# Patient Record
Sex: MALE | Race: BLACK OR AFRICAN AMERICAN | Employment: OTHER | URBAN - METROPOLITAN AREA
[De-identification: names, ages, dates, MRNs, and addresses within clinical notes are randomized per-mention and may not be internally consistent; named-entity substitution may affect disease eponyms.]

---

## 2017-01-30 ENCOUNTER — HOSPITAL ENCOUNTER (OUTPATIENT)
Dept: LAB | Age: 67
Discharge: HOME | End: 2017-01-30
Payer: COMMERCIAL

## 2017-01-30 LAB
ANION GAP SERPL CALCULATED.3IONS-SCNC: 16 MEQ/L (ref 7–13)
BUN BLDV-MCNC: 20 MG/DL (ref 8–23)
CALCIUM SERPL-MCNC: 10.1 MG/DL (ref 8.6–10.2)
CHLORIDE BLD-SCNC: 102 MEQ/L (ref 98–107)
CO2: 25 MEQ/L (ref 22–29)
CREAT SERPL-MCNC: 1.5 MG/DL (ref 0.7–1.2)
GFR AFRICAN AMERICAN: 56.8
GFR NON-AFRICAN AMERICAN: 46.9
GLUCOSE BLD-MCNC: 122 MG/DL (ref 74–109)
POTASSIUM SERPL-SCNC: 3.8 MEQ/L (ref 3.5–5.1)
SODIUM BLD-SCNC: 143 MEQ/L (ref 132–144)
TSH SERPL DL<=0.05 MIU/L-ACNC: 0.7 UIU/ML (ref 0.27–4.2)

## 2017-01-30 PROCEDURE — 84443 ASSAY THYROID STIM HORMONE: CPT

## 2017-01-30 PROCEDURE — 36415 COLL VENOUS BLD VENIPUNCTURE: CPT

## 2017-01-30 PROCEDURE — 80048 BASIC METABOLIC PNL TOTAL CA: CPT

## 2018-02-26 ENCOUNTER — HOSPITAL ENCOUNTER (OUTPATIENT)
Dept: LAB | Age: 68
Discharge: HOME | End: 2018-02-26
Payer: MEDICARE

## 2018-02-26 LAB
ANION GAP SERPL CALCULATED.3IONS-SCNC: 15 MEQ/L (ref 7–13)
BUN BLDV-MCNC: 21 MG/DL (ref 8–23)
CALCIUM SERPL-MCNC: 9.2 MG/DL (ref 8.6–10.2)
CHLORIDE BLD-SCNC: 102 MEQ/L (ref 98–107)
CHOLESTEROL, TOTAL: 113 MG/DL (ref 0–199)
CO2: 25 MEQ/L (ref 22–29)
CREAT SERPL-MCNC: 1.36 MG/DL (ref 0.7–1.2)
GFR AFRICAN AMERICAN: >60
GFR NON-AFRICAN AMERICAN: 52.3
GLUCOSE BLD-MCNC: 122 MG/DL (ref 74–109)
HDLC SERPL-MCNC: 36 MG/DL (ref 40–59)
LDL CHOLESTEROL CALCULATED: 50 MG/DL (ref 0–129)
POTASSIUM SERPL-SCNC: 4.2 MEQ/L (ref 3.5–5.1)
SODIUM BLD-SCNC: 142 MEQ/L (ref 132–144)
TRIGL SERPL-MCNC: 137 MG/DL (ref 0–200)
TSH SERPL DL<=0.05 MIU/L-ACNC: 1.18 UIU/ML (ref 0.27–4.2)

## 2018-02-26 PROCEDURE — 80061 LIPID PANEL: CPT

## 2018-02-26 PROCEDURE — 36415 COLL VENOUS BLD VENIPUNCTURE: CPT

## 2018-02-26 PROCEDURE — 80048 BASIC METABOLIC PNL TOTAL CA: CPT

## 2018-02-26 PROCEDURE — 84443 ASSAY THYROID STIM HORMONE: CPT

## 2018-03-23 ENCOUNTER — HOSPITAL ENCOUNTER (OUTPATIENT)
Dept: ULTRASOUND IMAGING | Age: 68
Discharge: HOME | End: 2018-03-25
Payer: MEDICARE

## 2018-03-23 DIAGNOSIS — M79.89 CALF SWELLING: ICD-10-CM

## 2018-03-23 PROCEDURE — 93970 EXTREMITY STUDY: CPT

## 2018-04-20 ENCOUNTER — HOSPITAL ENCOUNTER (OUTPATIENT)
Age: 68
Setting detail: SPECIMEN
Discharge: HOME | End: 2018-04-20
Payer: MEDICARE

## 2018-04-20 ENCOUNTER — OFFICE VISIT (OUTPATIENT)
Dept: FAMILY MEDICINE CLINIC | Age: 68
End: 2018-04-20
Payer: MEDICARE

## 2018-04-20 VITALS
HEART RATE: 89 BPM | TEMPERATURE: 98.2 F | HEIGHT: 78 IN | BODY MASS INDEX: 27.68 KG/M2 | RESPIRATION RATE: 16 BRPM | DIASTOLIC BLOOD PRESSURE: 72 MMHG | SYSTOLIC BLOOD PRESSURE: 122 MMHG | OXYGEN SATURATION: 96 % | WEIGHT: 239.2 LBS

## 2018-04-20 DIAGNOSIS — J34.0 NASAL ABSCESS: Primary | ICD-10-CM

## 2018-04-20 DIAGNOSIS — J34.0 NASAL ABSCESS: ICD-10-CM

## 2018-04-20 PROCEDURE — 87070 CULTURE OTHR SPECIMN AEROBIC: CPT

## 2018-04-20 PROCEDURE — 99212 OFFICE O/P EST SF 10 MIN: CPT | Performed by: NURSE PRACTITIONER

## 2018-04-20 PROCEDURE — 87186 SC STD MICRODIL/AGAR DIL: CPT

## 2018-04-20 PROCEDURE — 87075 CULTR BACTERIA EXCEPT BLOOD: CPT

## 2018-04-20 PROCEDURE — 87205 SMEAR GRAM STAIN: CPT

## 2018-04-20 PROCEDURE — 87077 CULTURE AEROBIC IDENTIFY: CPT

## 2018-04-20 RX ORDER — SULFAMETHOXAZOLE AND TRIMETHOPRIM 800; 160 MG/1; MG/1
1 TABLET ORAL 2 TIMES DAILY
Qty: 20 TABLET | Refills: 0 | Status: SHIPPED | OUTPATIENT
Start: 2018-04-20 | End: 2018-04-30

## 2018-04-20 ASSESSMENT — ENCOUNTER SYMPTOMS
CHEST TIGHTNESS: 0
COUGH: 0
SHORTNESS OF BREATH: 0
WHEEZING: 0

## 2018-04-23 LAB
ANAEROBIC CULTURE: ABNORMAL
GRAM STAIN RESULT: ABNORMAL
ORGANISM: ABNORMAL
ORGANISM: ABNORMAL
WOUND/ABSCESS: ABNORMAL
WOUND/ABSCESS: ABNORMAL

## 2018-08-15 ENCOUNTER — ANESTHESIA (OUTPATIENT)
Dept: OPERATING ROOM | Age: 68
End: 2018-08-15
Payer: MEDICARE

## 2018-08-15 ENCOUNTER — ANESTHESIA EVENT (OUTPATIENT)
Dept: OPERATING ROOM | Age: 68
End: 2018-08-15
Payer: MEDICARE

## 2018-08-15 ENCOUNTER — HOSPITAL ENCOUNTER (OUTPATIENT)
Age: 68
Setting detail: OUTPATIENT SURGERY
Discharge: HOME | End: 2018-08-15
Attending: SPECIALIST | Admitting: SPECIALIST
Payer: MEDICARE

## 2018-08-15 VITALS
WEIGHT: 230 LBS | HEART RATE: 61 BPM | TEMPERATURE: 98.2 F | RESPIRATION RATE: 16 BRPM | BODY MASS INDEX: 27.16 KG/M2 | OXYGEN SATURATION: 93 % | DIASTOLIC BLOOD PRESSURE: 79 MMHG | HEIGHT: 77 IN | SYSTOLIC BLOOD PRESSURE: 144 MMHG

## 2018-08-15 VITALS
SYSTOLIC BLOOD PRESSURE: 99 MMHG | OXYGEN SATURATION: 95 % | RESPIRATION RATE: 14 BRPM | DIASTOLIC BLOOD PRESSURE: 55 MMHG

## 2018-08-15 PROCEDURE — 2500000003 HC RX 250 WO HCPCS: Performed by: NURSE ANESTHETIST, CERTIFIED REGISTERED

## 2018-08-15 PROCEDURE — 7100000010 HC PHASE II RECOVERY - FIRST 15 MIN: Performed by: SPECIALIST

## 2018-08-15 PROCEDURE — 2580000003 HC RX 258: Performed by: SPECIALIST

## 2018-08-15 PROCEDURE — 6370000000 HC RX 637 (ALT 250 FOR IP): Performed by: SPECIALIST

## 2018-08-15 PROCEDURE — 2580000003 HC RX 258: Performed by: NURSE PRACTITIONER

## 2018-08-15 PROCEDURE — 3700000001 HC ADD 15 MINUTES (ANESTHESIA): Performed by: SPECIALIST

## 2018-08-15 PROCEDURE — 7100000011 HC PHASE II RECOVERY - ADDTL 15 MIN: Performed by: SPECIALIST

## 2018-08-15 PROCEDURE — 6360000002 HC RX W HCPCS: Performed by: NURSE ANESTHETIST, CERTIFIED REGISTERED

## 2018-08-15 PROCEDURE — 3700000000 HC ANESTHESIA ATTENDED CARE: Performed by: SPECIALIST

## 2018-08-15 PROCEDURE — 2709999900 HC NON-CHARGEABLE SUPPLY: Performed by: SPECIALIST

## 2018-08-15 PROCEDURE — 3609027000 HC COLONOSCOPY: Performed by: SPECIALIST

## 2018-08-15 PROCEDURE — 88305 TISSUE EXAM BY PATHOLOGIST: CPT

## 2018-08-15 RX ORDER — PROPOFOL 10 MG/ML
INJECTION, EMULSION INTRAVENOUS PRN
Status: DISCONTINUED | OUTPATIENT
Start: 2018-08-15 | End: 2018-08-15 | Stop reason: SDUPTHER

## 2018-08-15 RX ORDER — MAGNESIUM HYDROXIDE 1200 MG/15ML
LIQUID ORAL PRN
Status: DISCONTINUED | OUTPATIENT
Start: 2018-08-15 | End: 2018-08-15 | Stop reason: HOSPADM

## 2018-08-15 RX ORDER — ONDANSETRON 2 MG/ML
4 INJECTION INTRAMUSCULAR; INTRAVENOUS
Status: DISCONTINUED | OUTPATIENT
Start: 2018-08-15 | End: 2018-08-15 | Stop reason: HOSPADM

## 2018-08-15 RX ORDER — SODIUM CHLORIDE 0.9 % (FLUSH) 0.9 %
10 SYRINGE (ML) INJECTION PRN
Status: DISCONTINUED | OUTPATIENT
Start: 2018-08-15 | End: 2018-08-15 | Stop reason: HOSPADM

## 2018-08-15 RX ORDER — SODIUM CHLORIDE 0.9 % (FLUSH) 0.9 %
10 SYRINGE (ML) INJECTION EVERY 12 HOURS SCHEDULED
Status: DISCONTINUED | OUTPATIENT
Start: 2018-08-15 | End: 2018-08-15 | Stop reason: HOSPADM

## 2018-08-15 RX ORDER — SODIUM CHLORIDE, SODIUM LACTATE, POTASSIUM CHLORIDE, CALCIUM CHLORIDE 600; 310; 30; 20 MG/100ML; MG/100ML; MG/100ML; MG/100ML
INJECTION, SOLUTION INTRAVENOUS CONTINUOUS
Status: DISCONTINUED | OUTPATIENT
Start: 2018-08-15 | End: 2018-08-15 | Stop reason: HOSPADM

## 2018-08-15 RX ORDER — SIMETHICONE 20 MG/.3ML
EMULSION ORAL PRN
Status: DISCONTINUED | OUTPATIENT
Start: 2018-08-15 | End: 2018-08-15 | Stop reason: HOSPADM

## 2018-08-15 RX ORDER — LIDOCAINE HYDROCHLORIDE 10 MG/ML
1 INJECTION, SOLUTION EPIDURAL; INFILTRATION; INTRACAUDAL; PERINEURAL
Status: DISCONTINUED | OUTPATIENT
Start: 2018-08-15 | End: 2018-08-15 | Stop reason: HOSPADM

## 2018-08-15 RX ORDER — LIDOCAINE HYDROCHLORIDE 10 MG/ML
INJECTION, SOLUTION INFILTRATION; PERINEURAL PRN
Status: DISCONTINUED | OUTPATIENT
Start: 2018-08-15 | End: 2018-08-15 | Stop reason: SDUPTHER

## 2018-08-15 RX ADMIN — PROPOFOL 10 MG: 10 INJECTION, EMULSION INTRAVENOUS at 07:54

## 2018-08-15 RX ADMIN — PROPOFOL 40 MG: 10 INJECTION, EMULSION INTRAVENOUS at 07:39

## 2018-08-15 RX ADMIN — LIDOCAINE HYDROCHLORIDE 40 MG: 10 INJECTION, SOLUTION INFILTRATION; PERINEURAL at 07:38

## 2018-08-15 RX ADMIN — PROPOFOL 20 MG: 10 INJECTION, EMULSION INTRAVENOUS at 07:51

## 2018-08-15 RX ADMIN — PROPOFOL 30 MG: 10 INJECTION, EMULSION INTRAVENOUS at 07:47

## 2018-08-15 RX ADMIN — PROPOFOL 30 MG: 10 INJECTION, EMULSION INTRAVENOUS at 07:46

## 2018-08-15 RX ADMIN — PROPOFOL 20 MG: 10 INJECTION, EMULSION INTRAVENOUS at 07:40

## 2018-08-15 RX ADMIN — PROPOFOL 20 MG: 10 INJECTION, EMULSION INTRAVENOUS at 07:52

## 2018-08-15 RX ADMIN — PROPOFOL 30 MG: 10 INJECTION, EMULSION INTRAVENOUS at 07:43

## 2018-08-15 RX ADMIN — PROPOFOL 50 MG: 10 INJECTION, EMULSION INTRAVENOUS at 07:38

## 2018-08-15 RX ADMIN — SODIUM CHLORIDE, POTASSIUM CHLORIDE, SODIUM LACTATE AND CALCIUM CHLORIDE: 600; 310; 30; 20 INJECTION, SOLUTION INTRAVENOUS at 06:51

## 2018-08-15 RX ADMIN — PROPOFOL 10 MG: 10 INJECTION, EMULSION INTRAVENOUS at 07:58

## 2018-08-15 RX ADMIN — PROPOFOL 10 MG: 10 INJECTION, EMULSION INTRAVENOUS at 07:53

## 2018-08-15 RX ADMIN — PROPOFOL 20 MG: 10 INJECTION, EMULSION INTRAVENOUS at 07:48

## 2018-08-15 RX ADMIN — PROPOFOL 30 MG: 10 INJECTION, EMULSION INTRAVENOUS at 07:45

## 2018-08-15 RX ADMIN — PROPOFOL 20 MG: 10 INJECTION, EMULSION INTRAVENOUS at 07:44

## 2018-08-15 RX ADMIN — PROPOFOL 20 MG: 10 INJECTION, EMULSION INTRAVENOUS at 07:50

## 2018-08-15 RX ADMIN — PROPOFOL 10 MG: 10 INJECTION, EMULSION INTRAVENOUS at 07:56

## 2018-08-15 RX ADMIN — PROPOFOL 20 MG: 10 INJECTION, EMULSION INTRAVENOUS at 07:49

## 2018-08-15 RX ADMIN — PROPOFOL 30 MG: 10 INJECTION, EMULSION INTRAVENOUS at 07:42

## 2018-08-15 RX ADMIN — PROPOFOL 30 MG: 10 INJECTION, EMULSION INTRAVENOUS at 07:41

## 2018-08-15 ASSESSMENT — PULMONARY FUNCTION TESTS
PIF_VALUE: 0
PIF_VALUE: 1
PIF_VALUE: 0

## 2018-08-15 ASSESSMENT — PAIN - FUNCTIONAL ASSESSMENT: PAIN_FUNCTIONAL_ASSESSMENT: 0-10

## 2018-08-15 NOTE — OP NOTE
Colonoscopy Procedure Note      Patient: Carlotta Bowen  YOB: 1950  MRN: [id number]  Date of Procedure: 8/15/2018    Pre-Op Diagnosis:  - High Risk Screening, Family hx of colon ca    Post-Op Diagnosis: Same, polyp cecum. Internal hemorrhoid. Procedure(s):  COLONOSCOPY      Surgeon(s):  Manjula Freedman MD    Staff:  Scrub Person First: Quinn Berg     Consent:  The patient or their legal guardian has signed a consent, and is aware of the potential risks, benefits, alternatives, and potential complications of this procedure. These include, but are not limited to hemorrhage, bleeding, post procedural pain, perforation, phlebitis, aspiration, hypotension, hypoxia, cardiovascular events such as arryhthmia, and possibly death. Additionally, the possibility of missed colonic polyps and interval colon cancer was discussed in the consent. Anesthesia:  Monitor Anesthesia Care    Procedure: An informed consent was obtained from the patient after explanation of indications, benefits, possible risks and complications of the procedure. The patient was then taken to the endoscopy suite, placed in the left lateral decubitus position, and the above IV anesthesia was administered. A digital rectal examination was performed and revealed negative without mass, lesions or tenderness. The Olympus video colonoscope was placed in the patient's rectum under digital direction and advanced to the cecum. The cecum was identified by characteristic anatomy and confirmed with presence ileo-cecal valve, appendical orifice and transillumination of right lower quadrant. The prep was There were scattered liquid stool with some particles seen in the colon especially the right colon. Which was precluding a very optimal evaluation of the colonic mucosa. .    The scope was then withdrawn back through the cecum, ascending, transverse, descending, sigmoid colon, and rectum.   Careful circumferential examination of the mucosa in these areas demonstrated:    FINDINGS:  Cecum: Abnormal  A polyp measuring about 2 mm in size seen in the cecum which was removed using a cold biopsy forceps. .  Ascending Colon: Normal.  Hepatic Flexure: Normal.  Transverse Colon:Normal.  Splenic Flexure: Normal.  Descending Colon: Normal.  Sigmoid Colon: Normal.  Rectum: Normal.  Retroflexed Views: Rectum did show internal hemorrhoids. Patient tolerated the procedure well and was taken to the PACU in good condition. Estimated Blood Loss: * No values recorded between 8/15/2018  7:35 AM and 8/15/2018  1:35 AM *  Complication: None  Specimen Sent:   ID Type Source Tests Collected by Time Destination   A : cecal polyp Tissue Cecum SURGICAL PATHOLOGY Ruy Jennings MD 8/15/2018 [id number]        Impression:  See post-procedure diagnoses. Polyp cecum and internal hemorrhoid. Recommendations:  Since the prep was not very optimal would repeat colonoscopy after 3 years.     Electronically Signed:  Ruy Jennings MD  [facility]  8/15/2018

## 2018-08-15 NOTE — ANESTHESIA PRE PROCEDURE
Department of Anesthesiology  Preprocedure Note       Name:  Maged Ren   Age:  78 y.o.  :  1950                                          MRN:  [id number]         Date:  8/15/2018      Surgeon: Naida Verde):  [place] MD    Procedure: Procedure(s):  COLONOSCOPY    Medications prior to admission:   Prior to Admission medications    Medication Sig Start Date End Date Taking?  Authorizing Provider   oxybutynin (DITROPAN XL) 10 MG CR tablet Take 1 tablet by mouth daily 3/22/16  Yes Nataliia Puente MD   gabapentin (NEURONTIN) 300 MG capsule  1/3/16  Yes Historical Provider, MD   levalbuterol (XOPENEX HFA) 45 MCG/ACT inhaler Inhale 2 puffs into the lungs every 4 hours as needed for Wheezing   Yes Historical Provider, MD   naproxen (NAPROSYN) 500 MG tablet Take 500 mg by mouth 2 times daily (with meals)   Yes Historical Provider, MD   brimonidine (ALPHAGAN) 0.2 % ophthalmic solution  16  Yes Historical Provider, MD   tamsulosin (FLOMAX) 0.4 MG capsule  12/14/15  Yes Historical Provider, MD   lovastatin (MEVACOR) 40 MG tablet  16  Yes Historical Provider, MD   levothyroxine (SYNTHROID) 100 MCG tablet  16  Yes Historical Provider, MD   clonazePAM Rometta Burton) 2 MG tablet  12/26/15  Yes Historical Provider, MD   allopurinol (ZYLOPRIM) 300 MG tablet  16  Yes Historical Provider, MD   lisinopril (PRINIVIL;ZESTRIL) 2.5 MG tablet  16  Yes Historical Provider, MD   PrednisoLONE Sodium Phosphate (PREDNISOL OP) Apply to eye every 4 hours as needed   Yes Historical Provider, MD   predniSONE (DELTASONE) 10 MG tablet Take 10 mg by mouth daily   Yes Historical Provider, MD   traMADol (ULTRAM) 50 MG tablet Take 50 mg by mouth as needed for Pain    Historical Provider, MD       Current medications:    Current Facility-Administered Medications   Medication Dose Route Frequency Provider Last Rate Last Dose    lactated ringers infusion   Intravenous Continuous Bernardino Stagers, APRN -  mL/hr at 02/26/2018    LABGLOM 52.3 02/26/2018    GLUCOSE 122 02/26/2018    GLUCOSE 79 05/09/2012    CALCIUM 9.2 02/26/2018       POC Tests: No results for input(s): POCGLU, POCNA, POCK, POCCL, POCBUN, POCHEMO, POCHCT in the last 72 hours. Coags: No results found for: PROTIME, INR, APTT    HCG (If Applicable): No results found for: PREGTESTUR, PREGSERUM, HCG, HCGQUANT     ABGs: No results found for: PHART, PO2ART, VHC4MOZ, KKW4LDL, BEART, U3DEUXKI     Type & Screen (If Applicable):  No results found for: LABABO, [address]    Anesthesia Evaluation  Patient summary reviewed and Nursing notes reviewed  Airway: Mallampati: II  TM distance: >3 FB   Neck ROM: full  Mouth opening: > = 3 FB Dental: normal exam         Pulmonary:normal exam              Patient did not smoke on day of surgery. ROS comment: Former smoker   Cardiovascular:    (+) hypertension:,          Beta Blocker:  Not on Beta Blocker         Neuro/Psych:   Negative Neuro/Psych ROS              GI/Hepatic/Renal: Neg GI/Hepatic/Renal ROS  (+) bowel prep,           Endo/Other:    (+) hypothyroidism::., .          Pt had no PAT visit        ROS comment: gout Abdominal:           Vascular: negative vascular ROS. Anesthesia Plan      MAC     ASA 2       Induction: intravenous. Anesthetic plan and risks discussed with patient. Plan discussed with CRNA.                   TAMIR Blank - CRNA   8/15/2018

## 2018-08-31 ENCOUNTER — OFFICE VISIT (OUTPATIENT)
Dept: SURGERY | Age: 68
End: 2018-08-31
Payer: MEDICARE

## 2018-08-31 VITALS
HEIGHT: 78 IN | WEIGHT: 231 LBS | BODY MASS INDEX: 26.73 KG/M2 | SYSTOLIC BLOOD PRESSURE: 130 MMHG | DIASTOLIC BLOOD PRESSURE: 74 MMHG | TEMPERATURE: 97.9 F

## 2018-08-31 DIAGNOSIS — K62.89 ANAL OR RECTAL PAIN: Primary | ICD-10-CM

## 2018-08-31 PROCEDURE — 46600 DIAGNOSTIC ANOSCOPY SPX: CPT | Performed by: COLON & RECTAL SURGERY

## 2018-08-31 PROCEDURE — 99203 OFFICE O/P NEW LOW 30 MIN: CPT | Performed by: COLON & RECTAL SURGERY

## 2018-08-31 ASSESSMENT — ENCOUNTER SYMPTOMS
ABDOMINAL PAIN: 0
BLOOD IN STOOL: 0
ANAL BLEEDING: 0
WHEEZING: 0
RECTAL PAIN: 1
ABDOMINAL DISTENTION: 0
SHORTNESS OF BREATH: 0
CHEST TIGHTNESS: 0

## 2018-08-31 NOTE — PROGRESS NOTES
report has been partially produced using speech recognition software and may cause contain errors related to that system including grammar, punctuation and spelling as well as words and phrases that may seem inappropriate.  If there are questions or concerns please feel free to contact me to clarify

## 2018-09-07 ENCOUNTER — OFFICE VISIT (OUTPATIENT)
Dept: SURGERY | Age: 68
End: 2018-09-07
Payer: MEDICARE

## 2018-09-07 VITALS
SYSTOLIC BLOOD PRESSURE: 124 MMHG | TEMPERATURE: 97.1 F | BODY MASS INDEX: 27.54 KG/M2 | DIASTOLIC BLOOD PRESSURE: 78 MMHG | WEIGHT: 238 LBS | HEIGHT: 78 IN

## 2018-09-07 DIAGNOSIS — K62.89 ANAL PAIN: Primary | ICD-10-CM

## 2018-09-07 PROCEDURE — 99213 OFFICE O/P EST LOW 20 MIN: CPT | Performed by: COLON & RECTAL SURGERY

## 2018-09-07 RX ORDER — POLYETHYLENE GLYCOL 3350, SODIUM CHLORIDE, SODIUM BICARBONATE, POTASSIUM CHLORIDE 420; 11.2; 5.72; 1.48 G/4L; G/4L; G/4L; G/4L
POWDER, FOR SOLUTION ORAL
COMMUNITY
Start: 2018-07-24 | End: 2018-10-25 | Stop reason: ALTCHOICE

## 2018-09-07 ASSESSMENT — ENCOUNTER SYMPTOMS
SHORTNESS OF BREATH: 0
CONSTIPATION: 0
ABDOMINAL DISTENTION: 0
ABDOMINAL PAIN: 0
DIARRHEA: 0
RECTAL PAIN: 0
ANAL BLEEDING: 0
APNEA: 0
COUGH: 0
BLOOD IN STOOL: 0
COLOR CHANGE: 0

## 2018-10-02 ENCOUNTER — HOSPITAL ENCOUNTER (OUTPATIENT)
Dept: SLEEP CENTER | Age: 68
Discharge: HOME | End: 2018-10-04
Payer: MEDICARE

## 2018-10-02 PROCEDURE — 95811 POLYSOM 6/>YRS CPAP 4/> PARM: CPT

## 2018-10-02 PROCEDURE — 95811 POLYSOM 6/>YRS CPAP 4/> PARM: CPT | Performed by: INTERNAL MEDICINE

## 2018-10-25 ENCOUNTER — OFFICE VISIT (OUTPATIENT)
Dept: PULMONOLOGY | Age: 68
End: 2018-10-25
Payer: MEDICARE

## 2018-10-25 VITALS
DIASTOLIC BLOOD PRESSURE: 64 MMHG | WEIGHT: 228 LBS | RESPIRATION RATE: 16 BRPM | HEART RATE: 92 BPM | TEMPERATURE: 97.3 F | SYSTOLIC BLOOD PRESSURE: 130 MMHG | OXYGEN SATURATION: 93 % | BODY MASS INDEX: 26.38 KG/M2 | HEIGHT: 78 IN

## 2018-10-25 DIAGNOSIS — G47.33 OSA (OBSTRUCTIVE SLEEP APNEA): Primary | ICD-10-CM

## 2018-10-25 DIAGNOSIS — J98.6 ELEVATED DIAPHRAGM: ICD-10-CM

## 2018-10-25 DIAGNOSIS — Z77.090 ASBESTOS EXPOSURE: ICD-10-CM

## 2018-10-25 DIAGNOSIS — J44.9 ASTHMA-CHRONIC OBSTRUCTIVE PULMONARY DISEASE OVERLAP SYNDROME (HCC): ICD-10-CM

## 2018-10-25 PROBLEM — H52.03 HYPEROPIA OF BOTH EYES WITH ASTIGMATISM AND PRESBYOPIA: Status: ACTIVE | Noted: 2017-11-15

## 2018-10-25 PROBLEM — H52.203 HYPEROPIA OF BOTH EYES WITH ASTIGMATISM AND PRESBYOPIA: Status: ACTIVE | Noted: 2017-11-15

## 2018-10-25 PROBLEM — H40.1122 PRIMARY OPEN ANGLE GLAUCOMA (POAG) OF LEFT EYE, MODERATE STAGE: Status: ACTIVE | Noted: 2017-03-15

## 2018-10-25 PROBLEM — H52.4 HYPEROPIA OF BOTH EYES WITH ASTIGMATISM AND PRESBYOPIA: Status: ACTIVE | Noted: 2017-11-15

## 2018-10-25 PROBLEM — E11.9 TYPE 2 DIABETES MELLITUS WITHOUT COMPLICATION, WITHOUT LONG-TERM CURRENT USE OF INSULIN (HCC): Status: ACTIVE | Noted: 2017-11-15

## 2018-10-25 PROCEDURE — 99205 OFFICE O/P NEW HI 60 MIN: CPT | Performed by: INTERNAL MEDICINE

## 2018-10-25 RX ORDER — BLOOD SUGAR DIAGNOSTIC
STRIP MISCELLANEOUS
Status: ON HOLD | COMMUNITY
Start: 2018-09-10 | End: 2021-01-23 | Stop reason: HOSPADM

## 2018-10-25 RX ORDER — LANCETS
EACH MISCELLANEOUS
Status: ON HOLD | COMMUNITY
Start: 2018-09-10 | End: 2021-01-23 | Stop reason: HOSPADM

## 2018-10-25 ASSESSMENT — ENCOUNTER SYMPTOMS
WHEEZING: 1
SORE THROAT: 0
DIARRHEA: 0
VOMITING: 0
ABDOMINAL PAIN: 0
VOICE CHANGE: 0
COUGH: 1
RHINORRHEA: 0
EYE ITCHING: 0
CHEST TIGHTNESS: 0
NAUSEA: 0
SHORTNESS OF BREATH: 1

## 2018-10-25 NOTE — PROGRESS NOTES
Alcohol use No    Drug use: No    Sexual activity: Not on file     Other Topics Concern    Not on file     Social History Narrative    No narrative on file         Review of Systems   Constitutional: Negative for chills, diaphoresis, fatigue and fever. HENT: Negative for congestion, mouth sores, nosebleeds, postnasal drip, rhinorrhea, sneezing, sore throat and voice change. Eyes: Negative for itching and visual disturbance. Respiratory: Positive for cough, shortness of breath and wheezing. Negative for chest tightness. Cardiovascular: Negative. Negative for chest pain, palpitations and leg swelling. Gastrointestinal: Negative for abdominal pain, diarrhea, nausea and vomiting. Genitourinary: Negative for difficulty urinating and hematuria. Musculoskeletal: Negative for arthralgias, joint swelling and myalgias. Skin: Negative for rash. Allergic/Immunologic: Negative for environmental allergies. Neurological: Negative for dizziness, tremors, weakness and headaches. Psychiatric/Behavioral: Positive for sleep disturbance. Negative for behavioral problems. Objective:     Vitals:    10/25/18 1303   BP: 130/64   Pulse: 92   Resp: 16   Temp: 97.3 °F (36.3 °C)   TempSrc: Tympanic   SpO2: 93%   Weight: 228 lb (103.4 kg)   Height: 6' 6\" (1.981 m)         Physical Exam   Constitutional: He is oriented to person, place, and time. He appears well-developed and well-nourished. HENT:   Head: Normocephalic and atraumatic. Nose: Nose normal.   Mouth/Throat: Oropharynx is clear and moist.   Mallampati   IV   Eyes: Pupils are equal, round, and reactive to light. Conjunctivae and EOM are normal.   Neck: No JVD present. No tracheal deviation present. No thyromegaly present. Cardiovascular: Normal rate and regular rhythm. Exam reveals no gallop and no friction rub. No murmur heard. Pulmonary/Chest: Effort normal and breath sounds normal. No respiratory distress. He has no wheezes.  He has no rales. He exhibits no tenderness. diminished Breath sound bilaterally. Abdominal: He exhibits no distension. Musculoskeletal: Normal range of motion. Lymphadenopathy:     He has no cervical adenopathy. Neurological: He is alert and oriented to person, place, and time. No cranial nerve deficit. Skin: Skin is warm and dry. No rash noted. Psychiatric: He has a normal mood and affect. His behavior is normal.       Current Outpatient Prescriptions   Medication Sig Dispense Refill    ACCU-CHEK SALVADOR PLUS strip       ACCU-CHEK SOFTCLIX LANCETS [facility]       Respiratory Therapy Supplies SOURAV Change CPAP pressure to 6 cm   New CPAP mask and supplies 1 Device 0    gabapentin (NEURONTIN) 300 MG capsule       levalbuterol (XOPENEX HFA) 45 MCG/ACT inhaler Inhale 2 puffs into the lungs every 4 hours as needed for Wheezing      naproxen (NAPROSYN) 500 MG tablet Take 500 mg by mouth 2 times daily (with meals)      brimonidine (ALPHAGAN) 0.2 % ophthalmic solution       lovastatin (MEVACOR) 40 MG tablet       levothyroxine (SYNTHROID) 100 MCG tablet       clonazePAM (KLONOPIN) 2 MG tablet       allopurinol (ZYLOPRIM) 300 MG tablet       lisinopril (PRINIVIL;ZESTRIL) 2.5 MG tablet       traMADol (ULTRAM) 50 MG tablet Take 50 mg by mouth as needed for Pain      predniSONE (DELTASONE) 10 MG tablet Take 10 mg by mouth daily       No current facility-administered medications for this visit. Results for orders placed during the hospital encounter of 08/04/16   XR Chest Standard TWO VW    Narrative EXAMINATION 2 VIEWS CHEST      CLINICAL DATA HISTORY OF ASBESTOSIS, FOR FOLLOWUP. FINDINGS Two view examination of the chest demonstrates elevation of  the right hemidiaphragm and what may be atelectasis just above the  right hemidiaphragm, not seen on previous study of 1/7/2013.  The right  pleural-based density in the elevated right hemidiaphragm has developed  since the prior examination and warrants continual monitoring and  followup possibly with a CT scan. No acute cardiovascular Disease or change in heart size has occurred in  the left chest since 2/2/2012. IMPRESSION ATELECTASIS OR DEVELOPING LESION ADJACENT TO THE RIGHT  ELEVATED HEMIDIAPHRAGM SINCE 11/11/2010 AND 1/7/2013 WARRANTING  FOLLOWUP. LEFT CHEST IS CLEAR AND HEART SIZE IS NORMAL. [facility] By- Radha Coffey M.D. Released By- Radha Coffey M.D. Released Date Time- 08/04/16 1455   This document has been electronically signed. ------------------------------------------------------------------------------   ]  Results for orders placed during the hospital encounter of 08/09/16   CT Chest WO Contrast    Narrative EXAMINATION CT SCAN OF THE CHEST      INDICATION History of asbestosis. Elevated right hemidiaphragm. Atelectasis. TECHNIQUE Helical CT was performed through the chest without IV  contrast.     COMPARISON November 11, 2010. FINDINGS There is some soft tissue density projecting from the right  infrahilar area with linear extension into the posterior right lower  lobe consistent with an area of atelectasis/scarring. This was present  on the previous exam but appears slightly more extensive on today's  study. There is no discrete mass lesion. Airway is patent. Right  hemidiaphragm again remains elevated. No pleural effusion or thickening. Small calcified mediastinal lymph  nodes consistent with remote granulomatous disease. No pathologically  enlarged lymph nodes. No thoracic aortic aneurysm. The chest wall and  lower neck are normal.   Hyperdense material again noted within the  gallbladder. Osseous structures are normal.     IMPRESSION       CHRONIC ELEVATION OF RIGHT HEMIDIAPHRAGM. SLIGHT INCREASE IN RIGHT  LOWER LOBE ATELECTASIS/SCARRING.  NO EVIDENCE OF INTERSTITIAL COARSENING  OR PLEURAL DISEASE TO SUGGEST PULMONARY MANIFESTATIONS OF ASBESTOSIS  EXPOSURE. All CT scans at this facility use dose modulation, iterative  reconstruction, and/or weight based dosing when appropriate to reduce  radiation dose to as low as reasonably achievable. Michelle Markie By- Glenn Scanlon M.D. Released By- Glenn Scanlon M.D. Released Date Time- 08/12/16 1035   This document has been electronically signed. ------------------------------------------------------------------------------     Assessment/Plan:     1. YANG (obstructive sleep apnea)  Patient had CPAP titration study since patient was restless with current CPAP with 8 cm . He is on CPAP since last 15 yrs. He had  Repeat CPAPstudy done on 10/2/18 and therapeutic CPAP at 6. CPAP pressure will be switch to 6 cm . Patient is using CPAP with full face Mask. Patient is compliant with CPAP therapy, he is using about 8-9 hours . He has klonopin due to act out in sleep , restless sleep. He need CPAP supply . Counseling: CPAP/BiPAP uses, patient advised to use CPAP at least 5-6 hours every night. Driving: patient is advised for extreme caution when driving or operating machinery if there is a feeling of drowsiness, especially while driving it is preferable to stop driving and take a brief nap. Sleep hygiene:Avoid supine sleep, sleep on  sides. Avoid  sleep deprivation. Explained sleep hygiene. Advice to avoid Alcohol and sedative    2. Asthma-chronic obstructive pulmonary disease overlap syndrome ([address]  He is on Xopenex HFA 2 puff prn , PFT  And CXR requested. 3. Elevated diaphragm, chronic on rt. side. CXR in 2016 show chronic elevation of rt. Diaphragm with lower lobe atelectasis and scarring     4. Asbestos exposure  He was evaluated for asbestosis and he was told he has asbestosis      Return in about 4 weeks (around 11/22/2018) for COPD, yang, pft result.       Jennifer Nieves MD

## 2018-10-30 ENCOUNTER — HOSPITAL ENCOUNTER (OUTPATIENT)
Dept: GENERAL RADIOLOGY | Age: 68
Discharge: HOME | End: 2018-11-01
Payer: MEDICARE

## 2018-10-30 ENCOUNTER — HOSPITAL ENCOUNTER (OUTPATIENT)
Dept: PULMONOLOGY | Age: 68
Discharge: HOME | End: 2018-10-30
Payer: MEDICARE

## 2018-10-30 DIAGNOSIS — J44.9 ASTHMA-CHRONIC OBSTRUCTIVE PULMONARY DISEASE OVERLAP SYNDROME (HCC): ICD-10-CM

## 2018-10-30 PROCEDURE — 94060 EVALUATION OF WHEEZING: CPT | Performed by: INTERNAL MEDICINE

## 2018-10-30 PROCEDURE — 94726 PLETHYSMOGRAPHY LUNG VOLUMES: CPT | Performed by: INTERNAL MEDICINE

## 2018-10-30 PROCEDURE — 94729 DIFFUSING CAPACITY: CPT | Performed by: INTERNAL MEDICINE

## 2018-10-30 PROCEDURE — 94726 PLETHYSMOGRAPHY LUNG VOLUMES: CPT

## 2018-10-30 PROCEDURE — 71046 X-RAY EXAM CHEST 2 VIEWS: CPT

## 2018-10-30 PROCEDURE — 94060 EVALUATION OF WHEEZING: CPT

## 2018-10-30 PROCEDURE — 94729 DIFFUSING CAPACITY: CPT

## 2018-10-30 PROCEDURE — 6360000002 HC RX W HCPCS: Performed by: INTERNAL MEDICINE

## 2018-10-30 RX ORDER — ALBUTEROL SULFATE 2.5 MG/3ML
2.5 SOLUTION RESPIRATORY (INHALATION) ONCE
Status: COMPLETED | OUTPATIENT
Start: 2018-10-30 | End: 2018-10-30

## 2018-10-30 RX ADMIN — ALBUTEROL SULFATE 2.5 MG: 2.5 SOLUTION RESPIRATORY (INHALATION) at 13:17

## 2018-12-05 ENCOUNTER — OFFICE VISIT (OUTPATIENT)
Dept: PULMONOLOGY | Age: 68
End: 2018-12-05
Payer: MEDICARE

## 2018-12-05 VITALS
RESPIRATION RATE: 16 BRPM | TEMPERATURE: 97.6 F | WEIGHT: 225 LBS | SYSTOLIC BLOOD PRESSURE: 136 MMHG | HEIGHT: 78 IN | OXYGEN SATURATION: 97 % | HEART RATE: 88 BPM | DIASTOLIC BLOOD PRESSURE: 70 MMHG | BODY MASS INDEX: 26.03 KG/M2

## 2018-12-05 DIAGNOSIS — J44.9 CHRONIC OBSTRUCTIVE PULMONARY DISEASE, UNSPECIFIED COPD TYPE (HCC): ICD-10-CM

## 2018-12-05 DIAGNOSIS — G47.33 OSA (OBSTRUCTIVE SLEEP APNEA): Primary | ICD-10-CM

## 2018-12-05 DIAGNOSIS — Z77.090 ASBESTOS EXPOSURE: ICD-10-CM

## 2018-12-05 PROCEDURE — 99214 OFFICE O/P EST MOD 30 MIN: CPT | Performed by: INTERNAL MEDICINE

## 2018-12-05 ASSESSMENT — ENCOUNTER SYMPTOMS
SORE THROAT: 0
NAUSEA: 0
WHEEZING: 0
RHINORRHEA: 0
ABDOMINAL PAIN: 0
VOMITING: 0
SHORTNESS OF BREATH: 1
CHEST TIGHTNESS: 0
DIARRHEA: 0
VOICE CHANGE: 0
EYE ITCHING: 0
COUGH: 0

## 2018-12-05 NOTE — PROGRESS NOTES
Topics Concern    Not on file     Social History Narrative    No narrative on file         Review of Systems   Constitutional: Negative for chills, diaphoresis, fatigue and fever. HENT: Negative for congestion, mouth sores, nosebleeds, postnasal drip, rhinorrhea, sneezing, sore throat and voice change. Eyes: Negative for itching and visual disturbance. Respiratory: Positive for shortness of breath. Negative for cough, chest tightness and wheezing. Cardiovascular: Negative. Negative for chest pain, palpitations and leg swelling. Gastrointestinal: Negative for abdominal pain, diarrhea, nausea and vomiting. Genitourinary: Negative for difficulty urinating and hematuria. Musculoskeletal: Negative for arthralgias, joint swelling and myalgias. Skin: Negative for rash. Allergic/Immunologic: Negative for environmental allergies. Neurological: Negative for dizziness, tremors, weakness and headaches. Psychiatric/Behavioral: Positive for sleep disturbance. Negative for behavioral problems. Objective:     Vitals:    12/05/18 1411   BP: 136/70   Pulse: 88   Resp: 16   Temp: 97.6 °F (36.4 °C)   TempSrc: Tympanic   SpO2: 97%   Weight: 225 lb (102.1 kg)   Height: 6' 6\" (1.981 m)         Physical Exam   Constitutional: He is oriented to person, place, and time. He appears well-developed and well-nourished. HENT:   Head: Normocephalic and atraumatic. Nose: Nose normal.   Mouth/Throat: Oropharynx is clear and moist.   Mallampati  IV     Eyes: Pupils are equal, round, and reactive to light. Conjunctivae and EOM are normal.   Neck: No JVD present. No tracheal deviation present. No thyromegaly present. Cardiovascular: Normal rate and regular rhythm. Exam reveals no gallop and no friction rub. No murmur heard. Pulmonary/Chest: Effort normal and breath sounds normal. No respiratory distress. He has no wheezes. He has no rales. He exhibits no tenderness. diminished Breath sound bilaterally. during the hospital encounter of 08/09/16   CT Chest WO Contrast    Narrative EXAMINATION CT SCAN OF THE CHEST      INDICATION History of asbestosis. Elevated right hemidiaphragm. Atelectasis. TECHNIQUE Helical CT was performed through the chest without IV  contrast.     COMPARISON November 11, 2010. FINDINGS There is some soft tissue density projecting from the right  infrahilar area with linear extension into the posterior right lower  lobe consistent with an area of atelectasis/scarring. This was present  on the previous exam but appears slightly more extensive on today's  study. There is no discrete mass lesion. Airway is patent. Right  hemidiaphragm again remains elevated. No pleural effusion or thickening. Small calcified mediastinal lymph  nodes consistent with remote granulomatous disease. No pathologically  enlarged lymph nodes. No thoracic aortic aneurysm. The chest wall and  lower neck are normal.   Hyperdense material again noted within the  gallbladder. Osseous structures are normal.     IMPRESSION       CHRONIC ELEVATION OF RIGHT HEMIDIAPHRAGM. SLIGHT INCREASE IN RIGHT  LOWER LOBE ATELECTASIS/SCARRING. NO EVIDENCE OF INTERSTITIAL COARSENING  OR PLEURAL DISEASE TO SUGGEST PULMONARY MANIFESTATIONS OF ASBESTOSIS  EXPOSURE. All CT scans at this facility use dose modulation, iterative  reconstruction, and/or weight based dosing when appropriate to reduce  radiation dose to as low as reasonably achievable. Ari Gutierrez ByPradip Holliday M.D. Released By- Olga Holliday M.D. Released Date Time- 08/12/16 1035   This document has been electronically signed.    ------------------------------------------------------------------------------   [address]                      [facility], [address] [facility]                                PULMONARY FUNCTION     PATIENT NAME: [facility] :        1950  MED REC NO:   [id number]                            ROOM:  ACCOUNT NO:   [de-identified]                           ADMIT DATE: 10/30/2018  PROVIDER:     Giancarlo Galvan MD     DATE OF PROCEDURE:  10/30/2018     PFTs were done on this 46-year-old gentleman who is 6 feet 6 inches,  weighs 225 pounds with 35-year smoking history, quit 18 years ago,  presenting with dyspnea.     Spirometry showed a forced vital capacity of 4.12 L, which is 79% of  predicted. FEV1 was 2.96 L, which is 74% of predicted, FEV1/FVC ratio  was mildly reduced to 72%, FEF 25-75% was reduced to 1.95 L per second,  which is 57% of predicted. No significant reversibility noted after  bronchodilators. MVV was severely reduced.     Lung volumes done by body plethysmography showed a total lung capacity  of 7.65 L, which is 88% of predicted. Residual volume was 3.61 L, which  is 127% of predicted with increased RV/TLC ratio to 47%.    Diffusion capacity was normal at 26.52, which is 95% of predicted.     Airway resistance was slightly increased. Airway conductance was  slightly reduced.     OVERALL IMPRESSION:  The study suggests mild obstructive ventilatory  impairment without significant reversibility associated with mild  overinflation, consistent with mild COPD. Clinical correlation is  needed.           Christina Anglin MD     D: 2018 10:15:34       T: 2018 7:52    Assessment/Plan:     1. YANG (obstructive sleep apnea)  Patient is using CPAP with 6   centimeters of H2O with heated humidity. Patient is using CPAP for about 8  hours every night. Patient is using CPAP with   Full face Mask. Patient said  sleep is restful with the CPAP use. Patient is compliant with CPAP therapy and benefiting with CPAP use. Continue CPAP therapy as before. Counseling: CPAP/BiPAP uses, patient advised to use CPAP at least 5-6 hours every night.     Driving: patient is advised for extreme caution when driving or operating machinery if

## 2018-12-22 ENCOUNTER — HOSPITAL ENCOUNTER (OUTPATIENT)
Dept: LAB | Age: 68
Discharge: HOME | End: 2018-12-22
Payer: MEDICARE

## 2018-12-22 LAB
ALBUMIN SERPL-MCNC: 3.9 G/DL (ref 3.9–4.9)
ALP BLD-CCNC: 76 U/L (ref 35–104)
ALT SERPL-CCNC: 11 U/L (ref 0–41)
ANION GAP SERPL CALCULATED.3IONS-SCNC: 12 MEQ/L (ref 7–13)
ANISOCYTOSIS: ABNORMAL
AST SERPL-CCNC: 20 U/L (ref 0–40)
ATYPICAL LYMPHOCYTE RELATIVE PERCENT: 1 %
BASOPHILS ABSOLUTE: 0 K/UL (ref 0–0.2)
BASOPHILS RELATIVE PERCENT: 0.5 %
BILIRUB SERPL-MCNC: 0.3 MG/DL (ref 0–1.2)
BILIRUBIN DIRECT: <0.2 MG/DL (ref 0–0.3)
BILIRUBIN, INDIRECT: NORMAL MG/DL (ref 0–0.6)
BUN BLDV-MCNC: 11 MG/DL (ref 8–23)
CALCIUM SERPL-MCNC: 9.3 MG/DL (ref 8.6–10.2)
CHLORIDE BLD-SCNC: 105 MEQ/L (ref 98–107)
CO2: 26 MEQ/L (ref 22–29)
CREAT SERPL-MCNC: 1.42 MG/DL (ref 0.7–1.2)
EOSINOPHILS ABSOLUTE: 0.2 K/UL (ref 0–0.7)
EOSINOPHILS RELATIVE PERCENT: 4 %
GFR AFRICAN AMERICAN: >60
GFR NON-AFRICAN AMERICAN: 49.7
GLOBULIN: 2.8 G/DL (ref 2.3–3.5)
GLUCOSE BLD-MCNC: 102 MG/DL (ref 74–109)
HCT VFR BLD CALC: 42.1 % (ref 42–52)
HEMOGLOBIN: 13.9 G/DL (ref 14–18)
LYMPHOCYTES ABSOLUTE: 1.2 K/UL (ref 1–4.8)
LYMPHOCYTES RELATIVE PERCENT: 23 %
MCH RBC QN AUTO: 28.6 PG (ref 27–31.3)
MCHC RBC AUTO-ENTMCNC: 33.1 % (ref 33–37)
MCV RBC AUTO: 86.4 FL (ref 80–100)
MICROCYTES: ABNORMAL
MONOCYTES ABSOLUTE: 0.6 K/UL (ref 0.2–0.8)
MONOCYTES RELATIVE PERCENT: 13.2 %
MYELOCYTE PERCENT: 1 %
NEUTROPHILS ABSOLUTE: 3 K/UL (ref 1.4–6.5)
NEUTROPHILS RELATIVE PERCENT: 59 %
PDW BLD-RTO: 16.7 % (ref 11.5–14.5)
PLATELET # BLD: 150 K/UL (ref 130–400)
POTASSIUM SERPL-SCNC: 4.4 MEQ/L (ref 3.5–5.1)
RBC # BLD: 4.87 M/UL (ref 4.7–6.1)
SMUDGE CELLS: 0.9
SODIUM BLD-SCNC: 143 MEQ/L (ref 132–144)
TOTAL PROTEIN: 6.7 G/DL (ref 6.4–8.1)
TSH SERPL DL<=0.05 MIU/L-ACNC: 1.38 UIU/ML (ref 0.27–4.2)
WBC # BLD: 5 K/UL (ref 4.8–10.8)

## 2018-12-22 PROCEDURE — 36415 COLL VENOUS BLD VENIPUNCTURE: CPT

## 2018-12-22 PROCEDURE — 85025 COMPLETE CBC W/AUTO DIFF WBC: CPT

## 2018-12-22 PROCEDURE — 82248 BILIRUBIN DIRECT: CPT

## 2018-12-22 PROCEDURE — 84443 ASSAY THYROID STIM HORMONE: CPT

## 2018-12-22 PROCEDURE — 80053 COMPREHEN METABOLIC PANEL: CPT

## 2019-05-15 ENCOUNTER — OFFICE VISIT (OUTPATIENT)
Dept: GASTROENTEROLOGY | Age: 69
End: 2019-05-15
Payer: MEDICARE

## 2019-05-15 VITALS
WEIGHT: 239 LBS | HEIGHT: 78 IN | BODY MASS INDEX: 27.65 KG/M2 | HEART RATE: 88 BPM | RESPIRATION RATE: 14 BRPM | OXYGEN SATURATION: 98 %

## 2019-05-15 DIAGNOSIS — K62.89 RECTAL PAIN: Primary | ICD-10-CM

## 2019-05-15 PROCEDURE — 99213 OFFICE O/P EST LOW 20 MIN: CPT | Performed by: SPECIALIST

## 2019-05-15 NOTE — PROGRESS NOTES
this report has been partially produced using speech recognitionsoftware  and may cause contain errors related to that system including grammar, punctuation and spelling as well as words andphrases that may seem inappropriate. If there are questions or concerns please feel free to contact me to clarify.

## 2019-05-17 ENCOUNTER — HOSPITAL ENCOUNTER (OUTPATIENT)
Dept: GENERAL RADIOLOGY | Age: 69
Discharge: HOME | End: 2019-05-19
Payer: MEDICARE

## 2019-05-17 DIAGNOSIS — M25.561 ARTHRALGIA OF RIGHT LOWER LEG: ICD-10-CM

## 2019-05-17 DIAGNOSIS — M25.562 ARTHRALGIA OF LEFT LOWER LEG: ICD-10-CM

## 2019-05-17 PROCEDURE — 73564 X-RAY EXAM KNEE 4 OR MORE: CPT

## 2019-05-21 ENCOUNTER — ANESTHESIA (OUTPATIENT)
Dept: ENDOSCOPY | Age: 69
End: 2019-05-21
Payer: MEDICARE

## 2019-05-21 ENCOUNTER — ANESTHESIA EVENT (OUTPATIENT)
Dept: ENDOSCOPY | Age: 69
End: 2019-05-21
Payer: MEDICARE

## 2019-05-21 ENCOUNTER — HOSPITAL ENCOUNTER (OUTPATIENT)
Age: 69
Setting detail: OUTPATIENT SURGERY
Discharge: HOME | End: 2019-05-21
Attending: SPECIALIST | Admitting: SPECIALIST
Payer: MEDICARE

## 2019-05-21 VITALS
BODY MASS INDEX: 27.19 KG/M2 | WEIGHT: 235 LBS | SYSTOLIC BLOOD PRESSURE: 134 MMHG | HEART RATE: 59 BPM | RESPIRATION RATE: 16 BRPM | DIASTOLIC BLOOD PRESSURE: 64 MMHG | OXYGEN SATURATION: 97 % | HEIGHT: 78 IN | TEMPERATURE: 98.1 F

## 2019-05-21 DIAGNOSIS — K62.89 RECTAL PAIN: ICD-10-CM

## 2019-05-21 PROCEDURE — 7100000010 HC PHASE II RECOVERY - FIRST 15 MIN: Performed by: SPECIALIST

## 2019-05-21 PROCEDURE — 3609155500 HC SIGMOIDOSCOPY WITH DILATION: Performed by: SPECIALIST

## 2019-05-21 PROCEDURE — 45330 DIAGNOSTIC SIGMOIDOSCOPY: CPT | Performed by: SPECIALIST

## 2019-05-21 PROCEDURE — 2580000003 HC RX 258: Performed by: SPECIALIST

## 2019-05-21 PROCEDURE — 7100000011 HC PHASE II RECOVERY - ADDTL 15 MIN: Performed by: SPECIALIST

## 2019-05-21 RX ORDER — LIDOCAINE HYDROCHLORIDE 10 MG/ML
1 INJECTION, SOLUTION EPIDURAL; INFILTRATION; INTRACAUDAL; PERINEURAL
Status: DISCONTINUED | OUTPATIENT
Start: 2019-05-21 | End: 2019-05-21 | Stop reason: HOSPADM

## 2019-05-21 RX ORDER — SODIUM CHLORIDE 9 MG/ML
INJECTION, SOLUTION INTRAVENOUS CONTINUOUS
Status: DISCONTINUED | OUTPATIENT
Start: 2019-05-21 | End: 2019-05-21 | Stop reason: HOSPADM

## 2019-05-21 RX ORDER — SODIUM CHLORIDE 0.9 % (FLUSH) 0.9 %
10 SYRINGE (ML) INJECTION PRN
Status: DISCONTINUED | OUTPATIENT
Start: 2019-05-21 | End: 2019-05-21 | Stop reason: HOSPADM

## 2019-05-21 RX ORDER — SODIUM CHLORIDE 0.9 % (FLUSH) 0.9 %
10 SYRINGE (ML) INJECTION EVERY 12 HOURS SCHEDULED
Status: DISCONTINUED | OUTPATIENT
Start: 2019-05-21 | End: 2019-05-21 | Stop reason: HOSPADM

## 2019-05-21 RX ADMIN — SODIUM CHLORIDE: 9 INJECTION, SOLUTION INTRAVENOUS at 10:29

## 2019-05-21 NOTE — ANESTHESIA PRE PROCEDURE
Department of Anesthesiology  Preprocedure Note       Name:  Barbara Haynes   Age:  80 y.o.  :  1950                                          MRN:  [id number]         Date:  2019      Surgeon: Tiffany iVllanueva):  Dawne Sicard, MD    Procedure: SIGMOIDOSCOPY W/ DILATION (N/A )    Medications prior to admission:   Prior to Admission medications    Medication Sig Start Date End Date Taking? Authorizing Provider   metFORMIN (GLUCOPHAGE) 500 MG tablet  10/30/18   Historical Provider, MD   [facility]  9/10/18   Historical Provider, MD   ACCU-CHEK SOFTCLIX LANCETS [facility]  9/10/18   Historical Provider, MD   Respiratory Therapy Supplies SOURAV Change CPAP pressure to 6 cm   New CPAP mask and supplies 10/25/18   Omar Frazier MD   gabapentin (NEURONTIN) 300 MG capsule  1/3/16   Historical Provider, MD   levalbuterol Shirin Morenoder HFA) 45 MCG/ACT inhaler Inhale 2 puffs into the lungs every 4 hours as needed for Wheezing    Historical Provider, MD   naproxen (NAPROSYN) 500 MG tablet Take 500 mg by mouth 2 times daily (with meals)    Historical Provider, MD   brimonidine (ALPHAGAN) 0.2 % ophthalmic solution  16   Historical Provider, MD   lovastatin (MEVACOR) 40 MG tablet  16   Historical Provider, MD   levothyroxine (SYNTHROID) 100 MCG tablet  16   Historical Provider, MD   clonazePAM [facility]) 2 MG tablet  12/26/15   Historical Provider, MD   allopurinol (ZYLOPRIM) 300 MG tablet  16   Historical Provider, MD   lisinopril (PRINIVIL;ZESTRIL) 2.5 MG tablet  16   Historical Provider, MD   traMADol (ULTRAM) 50 MG tablet Take 50 mg by mouth as needed for Pain    Historical Provider, MD   predniSONE (DELTASONE) 10 MG tablet Take 10 mg by mouth daily    Historical Provider, MD       Current medications:    No current facility-administered medications for this encounter.         Allergies:  No Known Allergies    Problem List:    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    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    Type 2 diabetes mellitus without complication, without long-term current use of insulin (HCC) E11.9    Wheezing R06.2       Past Medical History:        Diagnosis Date    Lung disease        Past Surgical History:        Procedure Laterality Date    COLONOSCOPY      HAND SURGERY Right     [place] COLON CA SCRN NOT HI RSK IND N/A 8/15/2018    COLONOSCOPY performed by Rashaun Sharpe MD at [address] [place] [address] Road         Social History:    Social History     Tobacco Use    Smoking status: Former Smoker     Years: 40.00     Start date: 1968     Last attempt to quit: 2000     Years since quittin.9    Smokeless tobacco: Never Used   Substance Use Topics    Alcohol use: No     Alcohol/week: 0.0 oz                                Counseling given: Not Answered      Vital Signs (Current): There were no vitals filed for this visit.                                            BP Readings from Last 3 Encounters:   18 136/70   10/25/18 130/64   18 124/78       NPO Status:                                                                                 BMI:   Wt Readings from Last 3 Encounters:   05/15/19 239 lb (108.4 kg)   18 225 lb (102.1 kg)   10/25/18 228 lb (103.4 kg)     There is no height or weight on file to calculate BMI.    CBC:   Lab Results   Component Value Date    WBC 5.0 2018    RBC 4.87 2018    HGB 13.9 2018    HCT 42.1 12/22/2018    MCV 86.4 12/22/2018    RDW 16.7 12/22/2018     12/22/2018       CMP:   Lab Results   Component Value Date     12/22/2018    K 4.4 12/22/2018     12/22/2018    CO2 26 12/22/2018    BUN 11 12/22/2018    CREATININE 1.42 12/22/2018    GFRAA >60.0 12/22/2018    LABGLOM 49.7 12/22/2018    GLUCOSE 102 12/22/2018    GLUCOSE 79 05/09/2012    PROT 6.7 12/22/2018    CALCIUM 9.3 12/22/2018    BILITOT 0.3 12/22/2018    ALKPHOS 76 12/22/2018    AST 20 12/22/2018    ALT 11 12/22/2018       POC Tests: No results for input(s): POCGLU, POCNA, POCK, POCCL, POCBUN, POCHEMO, POCHCT in the last 72 hours. Coags: No results found for: PROTIME, INR, APTT    HCG (If Applicable): No results found for: PREGTESTUR, PREGSERUM, HCG, HCGQUANT     ABGs: No results found for: PHART, PO2ART, UWF3VQA, GXW0XEK, BEART, I3ZCWGEI     Type & Screen (If Applicable):  No results found for: [facility]    Anesthesia Evaluation  Patient summary reviewed and Nursing notes reviewed  Airway: Mallampati: II  TM distance: >3 FB   Neck ROM: full  Mouth opening: > = 3 FB Dental: normal exam         Pulmonary:normal exam    (+) COPD:  sleep apnea:  asthma:           Patient did not smoke on day of surgery. ROS comment: Former smoker   Cardiovascular:    (+) hypertension:, hyperlipidemia                  Neuro/Psych:   Negative Neuro/Psych ROS              GI/Hepatic/Renal:   (+) bowel prep,           Endo/Other:    (+) DiabetesType II DM, , hypothyroidism::., .                 Abdominal:           Vascular: negative vascular ROS. Anesthesia Plan      MAC     ASA 2     (Patient will try procedure with no anesthesia and if unable to tolerate, then anesthesia will be used. Anesthesia standby.)  Induction: intravenous. Anesthetic plan and risks discussed with patient. Plan discussed with CRNA.                   TAMIR Huggins - RONALDO   5/21/2019

## 2019-09-05 ENCOUNTER — HOSPITAL ENCOUNTER (OUTPATIENT)
Dept: LAB | Age: 69
Discharge: HOME | End: 2019-09-05
Payer: MEDICARE

## 2019-09-05 LAB
ALBUMIN SERPL-MCNC: 3.9 G/DL (ref 3.5–4.6)
ALP BLD-CCNC: 57 U/L (ref 35–104)
ALT SERPL-CCNC: 10 U/L (ref 0–41)
ANION GAP SERPL CALCULATED.3IONS-SCNC: 13 MEQ/L (ref 9–15)
AST SERPL-CCNC: 17 U/L (ref 0–40)
BILIRUB SERPL-MCNC: 0.4 MG/DL (ref 0.2–0.7)
BUN BLDV-MCNC: 23 MG/DL (ref 8–23)
CALCIUM SERPL-MCNC: 9.3 MG/DL (ref 8.5–9.9)
CHLORIDE BLD-SCNC: 106 MEQ/L (ref 95–107)
CO2: 21 MEQ/L (ref 20–31)
CREAT SERPL-MCNC: 1.58 MG/DL (ref 0.7–1.2)
GFR AFRICAN AMERICAN: 53
GFR NON-AFRICAN AMERICAN: 43.8
GLOBULIN: 3.3 G/DL (ref 2.3–3.5)
GLUCOSE BLD-MCNC: 90 MG/DL (ref 70–99)
HCT VFR BLD CALC: 37.4 % (ref 42–52)
HEMOGLOBIN: 11.9 G/DL (ref 14–18)
MCH RBC QN AUTO: 29 PG (ref 27–31.3)
MCHC RBC AUTO-ENTMCNC: 31.7 % (ref 33–37)
MCV RBC AUTO: 91.2 FL (ref 80–100)
PDW BLD-RTO: 16.6 % (ref 11.5–14.5)
PLATELET # BLD: 257 K/UL (ref 130–400)
POTASSIUM SERPL-SCNC: 4.6 MEQ/L (ref 3.4–4.9)
PROSTATE SPECIFIC ANTIGEN: 3.01 NG/ML (ref 0–5.4)
RBC # BLD: 4.1 M/UL (ref 4.7–6.1)
SODIUM BLD-SCNC: 140 MEQ/L (ref 135–144)
TOTAL PROTEIN: 7.2 G/DL (ref 6.3–8)
WBC # BLD: 5.9 K/UL (ref 4.8–10.8)

## 2019-09-05 PROCEDURE — 80053 COMPREHEN METABOLIC PANEL: CPT

## 2019-09-05 PROCEDURE — 36415 COLL VENOUS BLD VENIPUNCTURE: CPT

## 2019-09-05 PROCEDURE — 85027 COMPLETE CBC AUTOMATED: CPT

## 2019-09-05 PROCEDURE — 84153 ASSAY OF PSA TOTAL: CPT

## 2020-08-12 ENCOUNTER — HOSPITAL ENCOUNTER (OUTPATIENT)
Dept: LAB | Age: 70
Discharge: HOME | End: 2020-08-12
Payer: MEDICARE

## 2020-08-12 LAB
ALBUMIN SERPL-MCNC: 3.8 G/DL (ref 3.5–4.6)
ALP BLD-CCNC: 74 U/L (ref 35–104)
ALT SERPL-CCNC: 11 U/L (ref 0–41)
ANION GAP SERPL CALCULATED.3IONS-SCNC: 11 MEQ/L (ref 9–15)
AST SERPL-CCNC: 19 U/L (ref 0–40)
BILIRUB SERPL-MCNC: 0.5 MG/DL (ref 0.2–0.7)
BUN BLDV-MCNC: 12 MG/DL (ref 8–23)
CALCIUM SERPL-MCNC: 9.5 MG/DL (ref 8.5–9.9)
CHLORIDE BLD-SCNC: 107 MEQ/L (ref 95–107)
CO2: 25 MEQ/L (ref 20–31)
CREAT SERPL-MCNC: 1.49 MG/DL (ref 0.7–1.2)
GFR AFRICAN AMERICAN: 56.6
GFR NON-AFRICAN AMERICAN: 46.8
GLOBULIN: 2.8 G/DL (ref 2.3–3.5)
GLUCOSE BLD-MCNC: 96 MG/DL (ref 70–99)
HCT VFR BLD CALC: 38.8 % (ref 42–52)
HEMOGLOBIN: 12.7 G/DL (ref 14–18)
MCH RBC QN AUTO: 28.2 PG (ref 27–31.3)
MCHC RBC AUTO-ENTMCNC: 32.6 % (ref 33–37)
MCV RBC AUTO: 86.4 FL (ref 80–100)
PDW BLD-RTO: 17 % (ref 11.5–14.5)
PLATELET # BLD: 161 K/UL (ref 130–400)
POTASSIUM SERPL-SCNC: 4.1 MEQ/L (ref 3.4–4.9)
PROSTATE SPECIFIC ANTIGEN: 3.5 NG/ML (ref 0–5.4)
RBC # BLD: 4.49 M/UL (ref 4.7–6.1)
SODIUM BLD-SCNC: 143 MEQ/L (ref 135–144)
TOTAL PROTEIN: 6.6 G/DL (ref 6.3–8)
TSH SERPL DL<=0.05 MIU/L-ACNC: 1.69 UIU/ML (ref 0.44–3.86)
WBC # BLD: 4.8 K/UL (ref 4.8–10.8)

## 2020-08-12 PROCEDURE — 85027 COMPLETE CBC AUTOMATED: CPT

## 2020-08-12 PROCEDURE — 36415 COLL VENOUS BLD VENIPUNCTURE: CPT

## 2020-08-12 PROCEDURE — 84153 ASSAY OF PSA TOTAL: CPT

## 2020-08-12 PROCEDURE — 80053 COMPREHEN METABOLIC PANEL: CPT

## 2020-08-12 PROCEDURE — 84443 ASSAY THYROID STIM HORMONE: CPT

## 2020-08-19 ENCOUNTER — TELEPHONE (OUTPATIENT)
Dept: CASE MANAGEMENT | Age: 70
End: 2020-08-19

## 2020-08-19 NOTE — TELEPHONE ENCOUNTER
Voicemail left requesting return call to obtain smoking hx. Order not in [address] or [address]. It does appear it has already been authorized by insurance.

## 2020-10-26 ENCOUNTER — OFFICE VISIT (OUTPATIENT)
Dept: GASTROENTEROLOGY | Age: 70
End: 2020-10-26
Payer: MEDICARE

## 2020-10-26 VITALS
HEIGHT: 78 IN | OXYGEN SATURATION: 97 % | RESPIRATION RATE: 14 BRPM | DIASTOLIC BLOOD PRESSURE: 88 MMHG | SYSTOLIC BLOOD PRESSURE: 130 MMHG | HEART RATE: 95 BPM | BODY MASS INDEX: 27.16 KG/M2

## 2020-10-26 PROCEDURE — 99213 OFFICE O/P EST LOW 20 MIN: CPT | Performed by: SPECIALIST

## 2020-10-26 ASSESSMENT — ENCOUNTER SYMPTOMS
ABDOMINAL PAIN: 1
VOMITING: 0
NAUSEA: 0
CONSTIPATION: 0
RECTAL PAIN: 0
ANAL BLEEDING: 0
DIARRHEA: 1
EYES NEGATIVE: 1
BLOOD IN STOOL: 0
ABDOMINAL DISTENTION: 0
RESPIRATORY NEGATIVE: 1

## 2020-10-26 NOTE — PROGRESS NOTES
Gastroenterology Clinic Visit    Amy Quintana  [id number]  Chief Complaint   Patient presents with   [facility] Consultation     States he has a foul odor coming from stomach. Was recently on antibiotic and states the smell went away, but it just came back. Diarrhea,       HPI: 72 y.o. male presents to the clinic with history of abdominal discomfort with bloating nausea and burping with foul-smelling order. Has occasional diarrhea. Symptoms started about a year ago. Patient was treated with 2 cycles of metronidazole by his PCP with improvement of his symptoms. He is referred to GI clinic for possible evaluation of SIBO. No history of vomiting, patient had a colonoscopy by me in August 2018 which showed a tubular adenoma of the cecum, patient has family history of colon cancer. No history of any fever or chills  History of occasional heartburn.,  No history of any dysphagia. History of occasional early satiety patient has diarrhea which is intermittent and sometimes patient does not have a BM for few days. No urinary symptoms, social history does not smoke does not drink alcohol. Review of Systems   Constitutional: Negative. HENT: Negative. Eyes: Negative. Respiratory: Negative. Gastrointestinal: Positive for abdominal pain and diarrhea. Negative for abdominal distention, anal bleeding, blood in stool, constipation, nausea, rectal pain and vomiting. Bloating   Genitourinary: Negative. Musculoskeletal: Negative. Neurological: Negative. Psychiatric/Behavioral: Negative.          Past Medical History:   Diagnosis Date    COPD [facility]chronic obstructive [facility] disease) [facility]     Diabetes mellitus ([facility]     Hyperlipidemia     Hypertension     Lung disease       Past Surgical History:   Procedure Laterality Date    COLONOSCOPY      HAND SURGERY Right     [place] COLON CA SCRN NOT HI RSK IND N/A 8/15/2018    COLONOSCOPY performed by Rose Griffin MD at [facility] 5/21/2019 SIGMOIDOSCOPY W/ DILATION performed by Ching Contreras MD at [address] [place] [address]       Current Outpatient Medications on File Prior to Visit   Medication Sig Dispense Refill    ACCU-CHEK SALVADOR PLUS strip       ACCU-CHEK SOFTCLIX LANCETS [facility]       Respiratory Therapy Supplies SOURAV Change CPAP pressure to 6 cm   New CPAP mask and supplies 1 Device 0    gabapentin (NEURONTIN) 300 MG capsule       levalbuterol (XOPENEX HFA) 45 MCG/ACT inhaler Inhale 2 puffs into the lungs every 4 hours as needed for Wheezing      brimonidine (ALPHAGAN) 0.2 % ophthalmic solution       lovastatin (MEVACOR) 40 MG tablet       levothyroxine (SYNTHROID) 100 MCG tablet       clonazePAM (KLONOPIN) 2 MG tablet       allopurinol (ZYLOPRIM) 300 MG tablet       lisinopril (PRINIVIL;ZESTRIL) 2.5 MG tablet       traMADol (ULTRAM) 50 MG tablet Take 50 mg by mouth as needed for Pain      predniSONE (DELTASONE) 10 MG tablet Take 10 mg by mouth daily      metFORMIN (GLUCOPHAGE) 500 MG tablet       naproxen (NAPROSYN) 500 MG tablet Take 500 mg by mouth 2 times daily (with meals)       No current facility-administered medications on file prior to visit.       Family History   Problem Relation Age of Onset    Coronary Art Dis Mother     Coronary Art Dis Sister     Colon Cancer Brother     Coronary Art Dis Brother       Social History     Socioeconomic History    Marital status:      Spouse name: None    Number of children: None    Years of education: None    Highest education level: None   Occupational History    None   Social Needs    Financial resource strain: None    Food insecurity     Worry: None     Inability: None    Transportation needs     Medical: None     Non-medical: None   Tobacco Use    Smoking status: Former Smoker     Years: 40.00     Types: Cigarettes     Start date: 1968     Last attempt to quit: 2000     Years since quittin.4    Smokeless tobacco: Never Used Substance and Sexual Activity    Alcohol use: No     Alcohol/week: 0.0 standard drinks    Drug use: No    Sexual activity: None   Lifestyle    Physical activity     Days per week: None     Minutes per session: None    Stress: None   Relationships    Social connections     Talks on phone: None     Gets together: None     Attends Zoroastrian service: None     Active member of club or organization: None     Attends meetings of clubs or organizations: None     Relationship status: None    Intimate partner violence     Fear of current or ex partner: None     Emotionally abused: None     Physically abused: None     Forced sexual activity: None   Other Topics Concern    None   Social History Narrative    None       Blood pressure 130/88, pulse 95, resp. rate 14, height 6' 6\" (1.981 m), SpO2 97 %. Physical Exam  Constitutional:       Appearance: He is well-developed. HENT:      Head: Normocephalic. Eyes:      Pupils: Pupils are equal, round, and reactive to light. Cardiovascular:      Rate and Rhythm: Normal rate and regular rhythm. Heart sounds: Normal heart sounds. Pulmonary:      Effort: Pulmonary effort is normal.      Breath sounds: Normal breath sounds. Abdominal:      General: Bowel sounds are normal.      Palpations: Abdomen is soft. Skin:     General: Skin is warm and dry. Neurological:      Mental Status: He is alert. Laboratory, Pathology, Radiology reviewed indetail with relevant important investigations summarized below:  Lab Results   Component Value Date    WBC 4.8 08/12/2020    HGB 12.7 (L) 08/12/2020    HCT 38.8 (L) 08/12/2020    MCV 86.4 08/12/2020     08/12/2020     Lab Results   Component Value Date    ALT 11 08/12/2020    AST 19 08/12/2020    ALKPHOS 74 08/12/2020    BILITOT 0.5 08/12/2020       No results found. Endoscopic investigations:     Assessmentand Plan:  72 y.o. male with history of bloating, early satiety and occasional diarrhea.   History of weight loss. Patient had some response to oral metronidazole. Differential diagnosis includes gastroparesis, GI dysmotility associated with diabetes with SIBO, so needs to rule out Gi neoplasm pathology because of weight loss. Will obtain stool studies, celiac disease panel check serum B12 and folate level, also order a CT of the abdomen pelvis, to return after 3 to 4 weeks   Diagnosis Orders   1. Dyspepsia  CT ABDOMEN PELVIS W IV CONTRAST Additional Contrast? Oral and Rectal   2. Diarrhea, unspecified type  Vitamin B12 & Folate    Pancreatic elastase, fecal    pH, stool    Reducing Substances, Stool    Fecal Fat, Qualitative    Celiac Disease Panel    CT ABDOMEN PELVIS W IV CONTRAST Additional Contrast? Oral and Rectal     Return in about 4 weeks (around 11/23/2020). Antony Benitez MD   Staff Gastroenterologist  [facility]    Please note this report has been partially produced using speech recognition software and may cause contain errors related to thatsystem including grammar, punctuation and spelling as well as words and phrases that may seem inappropriate. If there are questions or concerns please feel free to contact me to clarify.

## 2020-10-27 ENCOUNTER — HOSPITAL ENCOUNTER (OUTPATIENT)
Dept: LAB | Age: 70
Discharge: HOME | End: 2020-10-27
Payer: MEDICARE

## 2020-10-27 LAB
FOLATE: 16.8 NG/ML (ref 7.3–26.1)
VITAMIN B-12: 466 PG/ML (ref 232–1245)

## 2020-10-27 PROCEDURE — 82607 VITAMIN B-12: CPT

## 2020-10-27 PROCEDURE — 82705 FATS/LIPIDS FECES QUAL: CPT

## 2020-10-27 PROCEDURE — 82746 ASSAY OF FOLIC ACID SERUM: CPT

## 2020-10-27 PROCEDURE — 83520 IMMUNOASSAY QUANT NOS NONAB: CPT

## 2020-10-27 PROCEDURE — 84376 SUGARS SINGLE QUAL: CPT

## 2020-10-27 PROCEDURE — 36415 COLL VENOUS BLD VENIPUNCTURE: CPT

## 2020-10-27 PROCEDURE — 83516 IMMUNOASSAY NONANTIBODY: CPT

## 2020-10-27 PROCEDURE — 83986 ASSAY PH BODY FLUID NOS: CPT

## 2020-10-30 LAB
CELIAC PANEL: 14 UNITS (ref 0–19)
FECAL NEUTRAL FAT: NORMAL
FECAL PH: 6 (ref 5–8.5)
FECAL SPLIT FATS: NORMAL
REDUCING SUBSTANCES, STOOL: NORMAL

## 2020-10-31 LAB — PANCREATIC ELASTASE, FECAL: >800 UG/G

## 2020-11-09 ENCOUNTER — TELEPHONE (OUTPATIENT)
Dept: GASTROENTEROLOGY | Age: 70
End: 2020-11-09

## 2020-11-20 ENCOUNTER — HOSPITAL ENCOUNTER (OUTPATIENT)
Dept: LAB | Age: 70
Discharge: HOME | End: 2020-11-20
Payer: MEDICARE

## 2020-11-20 ENCOUNTER — HOSPITAL ENCOUNTER (OUTPATIENT)
Dept: CT IMAGING | Age: 70
Discharge: HOME | End: 2020-11-22
Payer: MEDICARE

## 2020-11-20 LAB
CREAT SERPL-MCNC: 1.66 MG/DL (ref 0.7–1.2)
GFR AFRICAN AMERICAN: 49.9
GFR NON-AFRICAN AMERICAN: 41.3

## 2020-11-20 PROCEDURE — 2500000003 HC RX 250 WO HCPCS: Performed by: SPECIALIST

## 2020-11-20 PROCEDURE — 36415 COLL VENOUS BLD VENIPUNCTURE: CPT

## 2020-11-20 PROCEDURE — 6360000004 HC RX CONTRAST MEDICATION: Performed by: SPECIALIST

## 2020-11-20 PROCEDURE — 82565 ASSAY OF CREATININE: CPT

## 2020-11-20 PROCEDURE — 74177 CT ABD & PELVIS W/CONTRAST: CPT

## 2020-11-20 RX ADMIN — BARIUM SULFATE 450 ML: 20 SUSPENSION ORAL at 12:16

## 2020-11-20 RX ADMIN — DIATRIZOATE MEGLUMINE AND DIATRIZOATE SODIUM 30 ML: 660; 100 LIQUID ORAL; RECTAL at 13:04

## 2020-11-27 ENCOUNTER — OFFICE VISIT (OUTPATIENT)
Dept: GASTROENTEROLOGY | Age: 70
End: 2020-11-27
Payer: MEDICARE

## 2020-11-27 VITALS
RESPIRATION RATE: 14 BRPM | HEART RATE: 90 BPM | DIASTOLIC BLOOD PRESSURE: 76 MMHG | SYSTOLIC BLOOD PRESSURE: 126 MMHG | OXYGEN SATURATION: 96 % | HEIGHT: 78 IN | WEIGHT: 228 LBS | BODY MASS INDEX: 26.38 KG/M2

## 2020-11-27 PROCEDURE — 99213 OFFICE O/P EST LOW 20 MIN: CPT | Performed by: SPECIALIST

## 2020-11-27 ASSESSMENT — ENCOUNTER SYMPTOMS
DIARRHEA: 0
RECTAL PAIN: 0
ABDOMINAL DISTENTION: 0
EYES NEGATIVE: 1
ANAL BLEEDING: 0
BLOOD IN STOOL: 0
RESPIRATORY NEGATIVE: 1
CONSTIPATION: 0
ABDOMINAL PAIN: 0
VOMITING: 0
NAUSEA: 0
GASTROINTESTINAL NEGATIVE: 1

## 2020-11-27 NOTE — PROGRESS NOTES
Gastroenterology Clinic Follow up Visit    Kiki Simpson  [id number]  Chief Complaint   Patient presents with    Follow-up     F/u after labs. Diarrhea is slowing down a little bit, comes and goes. HPI and A/P at last visit summarized below:  Patient is here for follow-up. CT of the abdomen showed enlarged prostate and kidney cyst and patient is scheduled to see Dr. Aries Mijares on December 8th. Stool for fat pancreatic elastase pH and reducing substances are all normal celiac disease panel was negative, vitamin B12 and folate levels are all normal, patient is concerned about bad breath and also foul-smelling rectal gas and foul-smelling order when patient regurgitates or burps.,  Patient had the symptoms for the last 1 to 1-1/2-year and he was treated with oral antibiotics for possible SIBO with transient improvement in his symptoms. It seems patient was treated with metronidazole. Review of Systems   Constitutional: Negative. HENT: Negative. Eyes: Negative. Respiratory: Negative. Gastrointestinal: Negative. Negative for abdominal distention, abdominal pain, anal bleeding, blood in stool, constipation, diarrhea, nausea, rectal pain and vomiting. Foul-smelling rectal gas and bad breath   Genitourinary: Negative. Musculoskeletal: Negative. Neurological: Negative. Psychiatric/Behavioral: Negative. Past medical history, past surgical history, medication list, social and familyhistory reviewed    Blood pressure 126/76, pulse 90, resp. rate 14, height 6' 6\" (1.981 m), weight 228 lb (103.4 kg), SpO2 96 %. Physical Exam    Laboratory, Pathology, Radiology reviewed in detail with relevantimportant investigations summarized below:    No results for input(s): WBC, HGB, HCT, MCV, PLT in the last 720 hours.   Lab Results   Component Value Date    ALT 11 08/12/2020    AST 19 08/12/2020    ALKPHOS 74 08/12/2020    BILITOT 0.5 08/12/2020     Ct Abdomen Pelvis W Iv Contrast Additional anterolisthesis of L4 on L5 is seen and slight retrolisthesis of L5 on S1 is visualized. Anterior endplate spurs are seen at multiple levels. 1. There is no bowel obstruction seen on this examination. The contrast was not seen in the transverse or ascending colon. Fecal material is seen throughout the colon. There is no evidence for diverticulitis or appendicitis. 2. Questionable bowel wall thickening versus incomplete distention the proximal small bowel is seen. 3. Multiple hypodense lesions are seen in both kidneys that could reflect cyst. In the right kidney the largest area appears to be septated and contains a calcification. Also nonobstructing nephroliths are seen in the left kidney. 4. Prostatomegaly. All CT scans at this facility use dose modulation, iterative reconstruction, and/or weight based dosing when appropriate to reduce radiation dose to as low as reasonably achievable. Endoscopic investigations:     Assessment and Plan:  Santosh Salinas 72 y.o. male for follow up. Dyspepsia associated with foul-smelling regurgitation and flatulence, possible SIBO, and had responded instantly to oral antibiotics. Will empirically try Xifaxan 200 mg p.o. 4 times a day for 2 weeks, patient also reports 10 pound weight loss in the last 3 months and will order a Doppler study of the mesenteric vessels to rule out any mesenteric vascular disease. Waiting urology evaluation. He has diabetes and its possible he may have some GI dysmotility related to diabetes and which may be predisposing to development of SIBO. Diagnosis Orders   1. Dyspepsia  CT DUPLEX ABD/PEL VASC STUDY,COMPLETE   2. Weight loss  CT DUPLEX ABD/PEL VASC STUDY,COMPLETE       Return in about 3 months (around 2/27/2021).     Jessica Collado MD   StaffGastroenterologist  [facility]    Please note this report has been partially produced using speech recognitionsoftware  and may cause contain errors related to that system including grammar, punctuation and spelling as well as words andphrases that may seem inappropriate. If there are questions or concerns please feel free to contact me to clarify.

## 2020-11-30 ENCOUNTER — TELEPHONE (OUTPATIENT)
Dept: GASTROENTEROLOGY | Age: 70
End: 2020-11-30

## 2020-11-30 NOTE — TELEPHONE ENCOUNTER
PA paperwork filled out. Waiting for a signature and diagnosis from [facility]. Will submit today.

## 2020-11-30 NOTE — TELEPHONE ENCOUNTER
Xifaxan was approved by insurance, but copay is still too much. Pt wants to know if there are any other alternatives to try.

## 2020-12-01 RX ORDER — METRONIDAZOLE 250 MG/1
250 TABLET ORAL 3 TIMES DAILY
Qty: 21 TABLET | Refills: 0 | Status: SHIPPED | OUTPATIENT
Start: 2020-12-01 | End: 2020-12-16

## 2020-12-07 ENCOUNTER — TELEPHONE (OUTPATIENT)
Dept: GASTROENTEROLOGY | Age: 70
End: 2020-12-07

## 2020-12-07 NOTE — TELEPHONE ENCOUNTER
Patient called stating you ordered a X Ray. I do not see a order for a X Ray. Please advise.  Thanks

## 2020-12-08 ENCOUNTER — OFFICE VISIT (OUTPATIENT)
Dept: UROLOGY | Age: 70
End: 2020-12-08
Payer: MEDICARE

## 2020-12-08 VITALS
WEIGHT: 228 LBS | SYSTOLIC BLOOD PRESSURE: 126 MMHG | DIASTOLIC BLOOD PRESSURE: 80 MMHG | BODY MASS INDEX: 26.38 KG/M2 | HEART RATE: 80 BPM | HEIGHT: 78 IN

## 2020-12-08 LAB
BILIRUBIN, POC: ABNORMAL
BLOOD URINE, POC: ABNORMAL
CLARITY, POC: CLEAR
COLOR, POC: YELLOW
GLUCOSE URINE, POC: ABNORMAL
KETONES, POC: ABNORMAL
LEUKOCYTE EST, POC: ABNORMAL
NITRITE, POC: ABNORMAL
PH, POC: 5.5
PROTEIN, POC: ABNORMAL
SPECIFIC GRAVITY, POC: 1.02
UROBILINOGEN, POC: 0.2

## 2020-12-08 PROCEDURE — 81003 URINALYSIS AUTO W/O SCOPE: CPT | Performed by: UROLOGY

## 2020-12-08 PROCEDURE — 99203 OFFICE O/P NEW LOW 30 MIN: CPT | Performed by: UROLOGY

## 2020-12-08 ASSESSMENT — ENCOUNTER SYMPTOMS
ABDOMINAL PAIN: 0
ABDOMINAL DISTENTION: 0
SHORTNESS OF BREATH: 0

## 2020-12-08 NOTE — PROGRESS NOTES
Subjective:      Patient ID: Gordo Solares is a 72 y.o. male. HPI  This is a 73 yo male with h/o HTN, Gout, CKD, DM, COPD, YANG/CPAP, OA, back after last seen in 3/16 for BPH for evaluation of bilateral renal cysts noted on a CT without contrast. He has CKD and is unable to get IV contrast. He is here with his wife today. He has no hematuria or dysuria or abdominal pain. He has lost some wt recently and gets occasional lumbar back pain. He has no fever or chills and has some stable frequency. He tried Flomax for about 6 months in the past and this did not help the symptoms. He had a cystoscopy and biopsy many years ago, negative for cancer. He has no other new complaints. I reviewed the CT on PACS personally and most of the renal lesions have a benign appearance except one on the Rt with a septation and calcification.        Past Medical History:   Diagnosis Date    COPD (chronic obstructive [facility] disease) ([facility] [place] [address]     Diabetes mellitus ([facility] [place] [address]     Hyperlipidemia     Hypertension     Lung disease      Past Surgical History:   Procedure Laterality Date    COLONOSCOPY      HAND SURGERY Right     CT COLON CA SCRN NOT HI RSK IND N/A 8/15/2018    [facility] N/A 2019    SIGMOIDOSCOPY W/ DILATION performed by Sada Douglas MD at [address] History     Socioeconomic History    Marital status:      Spouse name: None    Number of children: None    Years of education: None    Highest education level: None   Occupational History    None   Social Needs    Financial resource strain: None    Food insecurity     Worry: None     Inability: None    Transportation needs     Medical: None     Non-medical: None   Tobacco Use    Smoking status: Former Smoker     Years: 40.00     Types: Cigarettes     Start date: 1968     Last attempt to quit: 2000     Years since quittin.5    Smokeless tobacco: Never Used   Substance and Sexual Activity    Alcohol use: No     Alcohol/week: 0.0 standard drinks    Drug use: No    Sexual activity: None   Lifestyle    Physical activity     Days per week: None     Minutes per session: None    Stress: None   Relationships    Social connections     Talks on phone: None     Gets together: None     Attends [employer] service: None     Active member of club or organization: None     Attends meetings of clubs or organizations: None     Relationship status: None    Intimate partner violence     Fear of current or ex partner: None     Emotionally abused: None     Physically abused: None     Forced sexual activity: None   Other Topics Concern    None   Social History Narrative    None     Family History   Problem Relation Age of Onset    Coronary Art Dis Mother     Coronary Art Dis Sister     Colon Cancer Brother     Coronary Art Dis Brother      Current Outpatient Medications   Medication Sig Dispense Refill    metroNIDAZOLE (FLAGYL) 250 MG tablet Take 1 tablet by mouth 3 times daily for 14 days 21 tablet 0    metFORMIN (GLUCOPHAGE) 500 MG tablet       ACCU-CHEK SALVADOR PLUS strip       ACCU-CHEK SOFTCLIX LANCETS MISC       Respiratory Therapy Supplies SOURAV Change CPAP pressure to 6 cm   New CPAP mask and supplies 1 Device 0    gabapentin (NEURONTIN) 300 MG capsule       levalbuterol (XOPENEX HFA) 45 MCG/ACT inhaler Inhale 2 puffs into the lungs every 4 hours as needed for Wheezing      naproxen (NAPROSYN) 500 MG tablet Take 500 mg by mouth 2 times daily (with meals)      brimonidine (ALPHAGAN) 0.2 % ophthalmic solution       lovastatin (MEVACOR) 40 MG tablet       levothyroxine (SYNTHROID) 100 MCG tablet       clonazePAM (KLONOPIN) 2 MG tablet       allopurinol (ZYLOPRIM) 300 MG tablet       lisinopril (PRINIVIL;ZESTRIL) 2.5 MG tablet       traMADol (ULTRAM) 50 MG tablet Take 50 mg by mouth as needed for Pain      predniSONE (DELTASONE) 10 MG tablet Take 10 mg by mouth daily       No current facility-administered medications for this visit. Patient has no known allergies. reviewed    Review of Systems   Constitutional: Negative for unexpected weight change. Respiratory: Negative for shortness of breath. Cardiovascular: Negative for chest pain. Gastrointestinal: Negative for abdominal distention and abdominal pain. Genitourinary: Negative for dysuria and hematuria. Objective:   Physical Exam  Constitutional:       Appearance: Normal appearance. Abdominal:      General: Abdomen is protuberant. Palpations: Abdomen is soft. Tenderness: There is no abdominal tenderness. There is no right CVA tenderness, left CVA tenderness or guarding. Neurological:      Mental Status: He is alert. CT ABDOMEN PELVIS W IV CONTRAST Additional Contrast? Oral and Rectal   Status: Final result    Order Providers     Authorizing  Encounter  Billing    Johanna Roman MD  [facility] ROOM [address] [place] [address]            Signed by     Signed  Date/Time   Phone  Pager    Mady Najera  11/20/2020  14:19  [phone]     Reading Providers     Read  Date  Phone  Pager    Mady Najera  Nov 20, 2020  [phone]     Routing History     Priority  Sent On  From  To  Message Type     11/20/2020  2:22 PM  Buck, Chpo Incoming Radiant Results From Alexandria De Guzman MD  Results    Radiation Dose Estimates     No radiation information found for this patient    Narrative         COMPARISON: None available.         HISTORY: R10.13 Dyspepsia ICD10                   TECHNIQUE: procedure Thin slice spiral CT was performed from the lung bases through the iliac crests following the administration of oral and rectal contrast. Contrast enhancement was not employed due to patient's elevated labs. . Sagittal and coronal    reconstructions were also performed.              FINDINGS: Images through the lung bases demonstrate scarring or atelectasis in the visualized portion of the left lower lobe. The right hemidiaphragm is elevated.         Non-contrast images of the liver shows calcifications in the left lobe and also in the spleen. No intrahepatic ductal dilation is seen. The gallbladder, pancreas, spleen, adrenals are unremarkable. Bilaterally multiple hypodense lesions are seen in the    kidneys. The largest seen in the left kidney is in the upper pole and measures measures 4.4 cm. There are also nonobstructing calcification seen in the inferior pole of the left kidney that measure 6 and 7 mm. A the largest hypodensity seen in the right    kidney is in the inferior pole and measures 4.7 cm. It appears to be septated and contains a 5 mm calcification. No hydronephrosis is visualized. . Scattered vascular calcifications are seen  No aneurysm  is visualized. A very small umbilical hernia seen    that contains fat.         No  dilated loops of bowel, free air or free fluid is seen. There is fecal material seen within bowel. The contrast is seen in the descending colon from the rectum. There is also contrast seen The visualized portion of the small bowel. There is    questionable thickening of the proximal small bowel versus incomplete distention. The appendix is unremarkable.         The prostate gland is enlarged bilateral inguinal hernias are seen that contain fat. The urinary bladder shows tension. The prostate gland protrudes into the base of the urinary bladder. No destructive bony lesions are seen. Very slight anterolisthesis of L4 on L5 is seen and slight retrolisthesis of L5 on S1 is visualized. Anterior endplate spurs are seen at multiple levels.              Impression         1. There is no bowel obstruction seen on this examination. The contrast was not seen in the transverse or ascending colon. Fecal material is seen throughout the colon.  There is no evidence for diverticulitis or appendicitis.         2. Questionable bowel wall thickening versus incomplete distention the proximal small bowel is seen.         3. Multiple hypodense lesions are seen in both kidneys that could reflect cyst. In the right kidney the largest area appears to be septated and contains a calcification. Also nonobstructing nephroliths are seen in the left kidney. 4. Prostatomegaly.          All CT scans at this facility use dose modulation, iterative reconstruction, and/or weight based dosing when appropriate to reduce radiation dose to as low as reasonably achievable. PSA   Date Value Ref Range Status   08/12/2020 3.50 0.00 - 5.40 ng/mL Final     Comment:     When the Total PSA is between 3.00 and 10.00 ng/mL, consider  requesting a Free PSA to aid in diagnosis. 09/05/2019 3.01 0.00 - 5.40 ng/mL Final     Comment:     When the Total PSA is between 3.00 and 10.00 ng/mL, consider  requesting a Free PSA to aid in diagnosis. 07/01/2015 1.52 0.00 - 5.40 ng/mL Final   06/19/2014 1.71 0.00 - 5.40 ng/mL Final     Comment:     When the Total PSA is between 3.00 and 10.00 ng/mL, consider  requesting a Free PSA to aid in diagnosis. 12/8/2020 11:53 AM - Jono Bustos CMA ([facility])     Component  Collected  Lab    Color, UA  12/08/2020 11:52 AM  Unknown    yellow    Clarity, UA  12/08/2020 11:52 AM  Unknown    clear    Glucose, UA POC  12/08/2020 11:52 AM  Unknown    trace    Bilirubin, UA  12/08/2020 11:52 AM  Unknown    neg    Ketones, UA  12/08/2020 11:52 AM  Unknown    neg    Spec Grav, UA  12/08/2020 11:52 AM  Unknown    1.025    Blood, UA POC  12/08/2020 11:52 AM  Unknown    neg    pH, UA  12/08/2020 11:52 AM  Unknown    5.5    Protein, UA POC  12/08/2020 11:52 AM  Unknown    neg    Urobilinogen, UA  12/08/2020 11:52 AM  Unknown    0.2    Leukocytes, UA  12/08/2020 11:52 AM  Unknown    neg    Nitrite, UA  12/08/2020 11:52 AM  Unknown    neg    Lab and Collection       Assessment:       This is a 73 yo male with h/o HTN, Gout, CKD, DM, COPD, YANG/CPAP, OA, and with bilateral

## 2020-12-15 ENCOUNTER — HOSPITAL ENCOUNTER (OUTPATIENT)
Dept: ULTRASOUND IMAGING | Age: 70
Discharge: HOME | End: 2020-12-17
Payer: MEDICARE

## 2020-12-15 PROCEDURE — 93975 VASCULAR STUDY: CPT

## 2020-12-16 RX ORDER — METRONIDAZOLE 250 MG/1
TABLET ORAL
Qty: 21 TABLET | Refills: 0 | Status: SHIPPED | OUTPATIENT
Start: 2020-12-16 | End: 2021-01-05

## 2020-12-22 ENCOUNTER — HOSPITAL ENCOUNTER (OUTPATIENT)
Dept: MRI IMAGING | Age: 70
Discharge: HOME | End: 2020-12-24
Payer: MEDICARE

## 2020-12-22 PROCEDURE — 74183 MRI ABD W/O CNTR FLWD CNTR: CPT

## 2020-12-22 PROCEDURE — A9577 INJ MULTIHANCE: HCPCS | Performed by: UROLOGY

## 2020-12-22 PROCEDURE — 6360000004 HC RX CONTRAST MEDICATION: Performed by: UROLOGY

## 2020-12-22 RX ADMIN — GADOBENATE DIMEGLUMINE 20 ML: 529 INJECTION, SOLUTION INTRAVENOUS at 09:45

## 2021-01-05 ENCOUNTER — OFFICE VISIT (OUTPATIENT)
Dept: UROLOGY | Age: 71
End: 2021-01-05
Payer: MEDICARE

## 2021-01-05 VITALS
DIASTOLIC BLOOD PRESSURE: 70 MMHG | SYSTOLIC BLOOD PRESSURE: 138 MMHG | HEIGHT: 78 IN | WEIGHT: 225 LBS | BODY MASS INDEX: 26.03 KG/M2 | HEART RATE: 86 BPM

## 2021-01-05 DIAGNOSIS — N28.1 RENAL CYSTS, ACQUIRED, BILATERAL: Primary | ICD-10-CM

## 2021-01-05 PROCEDURE — 99212 OFFICE O/P EST SF 10 MIN: CPT | Performed by: UROLOGY

## 2021-01-05 ASSESSMENT — ENCOUNTER SYMPTOMS: ABDOMINAL PAIN: 0

## 2021-01-05 NOTE — PROGRESS NOTES
Subjective:      Patient ID: Aracelis Yadav is a 72 y.o. male. HPI  This is a 71 yo male with h/o HTN, Gout, CKD, DM, COPD, YANG/CPAP, OA, back for continued evaluation of bilateral renal cysts noted on a CT without contrast. Since last seen on 20, he has no new complaints and is here with his wife. He has no hematuria or abd pain. I reviewed the interval MRI results with the patient and wife today.      Past Medical History:   Diagnosis Date    [facility] [address] [facility]     [facility]     Hyperlipidemia     Hypertension     Lung disease      Past Surgical History:   Procedure Laterality Date    COLONOSCOPY      HAND SURGERY Right     NC COLON CA SCRN NOT HI RSK IND N/A 8/15/2018    COLONOSCOPY performed by Marily Chirinos MD at [address] [address] N/A 2019    SIGMOIDOSCOPY W/ DILATION performed by Marily Chirinos MD at [address] History     Socioeconomic History    Marital status:      Spouse name: None    Number of children: None    Years of education: None    Highest education level: None   Occupational History    None   Social Needs    Financial resource strain: None    Food insecurity     Worry: None     Inability: None    Transportation needs     Medical: None     Non-medical: None   Tobacco Use    Smoking status: Former Smoker     Years: 40.00     Types: Cigarettes     Start date: 1968     Quit date: 2000     Years since quittin.5    Smokeless tobacco: Never Used   Substance and Sexual Activity    Alcohol use: No     Alcohol/week: 0.0 standard drinks    Drug use: No    Sexual activity: None   Lifestyle    Physical activity     Days per week: None     Minutes per session: None    Stress: None   Relationships    Social connections     Talks on phone: None     Gets together: None     Attends [employer] service: None     Active member of club or organization: None Attends meetings of clubs or organizations: None     Relationship status: None    Intimate partner violence     Fear of current or ex partner: None     Emotionally abused: None     Physically abused: None     Forced sexual activity: None   Other Topics Concern    None   Social History Narrative    None     Family History   Problem Relation Age of Onset    Coronary Art Dis Mother     Coronary Art Dis Sister     Colon Cancer Brother     Coronary Art Dis Brother      Current Outpatient Medications   Medication Sig Dispense Refill    metFORMIN (GLUCOPHAGE) 500 MG tablet       ACCU-CHEK SALVADOR PLUS strip       [facility]       Respiratory Therapy Supplies SOURAV Change CPAP pressure to 6 cm   New CPAP mask and supplies 1 Device 0    gabapentin (NEURONTIN) 300 MG capsule       levalbuterol (XOPENEX HFA) 45 MCG/ACT inhaler Inhale 2 puffs into the lungs every 4 hours as needed for Wheezing      naproxen (NAPROSYN) 500 MG tablet Take 500 mg by mouth 2 times daily (with meals)      brimonidine (ALPHAGAN) 0.2 % ophthalmic solution       lovastatin (MEVACOR) 40 MG tablet       levothyroxine (SYNTHROID) 100 MCG tablet       clonazePAM (KLONOPIN) 2 MG tablet       allopurinol (ZYLOPRIM) 300 MG tablet       lisinopril (PRINIVIL;ZESTRIL) 2.5 MG tablet       traMADol (ULTRAM) 50 MG tablet Take 50 mg by mouth as needed for Pain      predniSONE (DELTASONE) 10 MG tablet Take 10 mg by mouth daily       No current facility-administered medications for this visit. Patient has no known allergies. reviewed      Review of Systems   Constitutional: Negative for unexpected weight change. Gastrointestinal: Negative for abdominal pain. Genitourinary: Negative for hematuria. Objective:   Physical Exam  Constitutional:       Appearance: Normal appearance. Neurological:      Mental Status: He is alert.    Psychiatric:         Mood and Affect: Mood normal.       MRI ABDOMEN W WO CONTRAST  Status: Final result   Order Providers    Authorizing Encounter Billing   Tyesha Shukla MD [facility] [address] Meredith Garzon MD          Signed by    Signed Date/Time Phone Pager   Logan Merritt 12/22/2020 12:00 -[phone]    Reading Providers    Read Date Phone Pager   Bharti Canseco Dec 22, 2020 12:00 [phone]    All Reviewers List    Tyesha hSukla MD on 12/22/2020 12:09   Routing History    Priority Sent On From To Message Type    12/22/2020 12:03 PM Buck, Reny Incoming Radiant Results From [address] [place] [address] MD Rafia Results   Orders Requiring a Screening Form    Procedure Order Status Form Status    MRI ABDOMEN W WO CONTRAST Completed Created   Radiation Dose Estimates    No radiation information found for this patient   Narrative   EXAMINATION:  MRI ABDOMEN W WO CONTRAST       HISTORY:  N28.1 Renal cyst ICD10.        TECHNIQUE:   Multiplanar MRI with multiple sequences before and after contrast. According to renal protocol.       Contrast:   IV: 20 ml of gadobenate dimeglumine (MULTIHANCE)   Oral: None.       COMPARISON: CT 11/20/2020.       RESULT:        Liver: Liver partially imaged and grossly unchanged compared to prior CT.       Biliary: Gallbladder partially imaged, containing probable sludge, otherwise unremarkable.       Spleen: No mass. No splenomegaly.       Pancreas: No mass or duct dilation.       Adrenals: No mass.       Kidneys:  Multiple cysts throughout both kidneys, mostly exophytic. The signal intensity is similar to water, with decreased T1 signal and increased T2 signal. No associated enhancement involving any of the cysts. The largest cyst on the right is    exophytic from the right interpolar to lower pole region and measures 4.3 x 4.6 x 3.9 cm, with thin septation.  The largest cyst on the left is exophytic from the upper pole and measures 5.3 x 3.7 x 6.9 cm within septation.       GI tract: No dilation or wall thickening in visualized bowel.       Lymph nodes: No abdominal lymphadenopathy.       Mesentery / Peritoneum / Retroperitoneum: No ascites or mass. Visualized portions.       Vasculature:  The celiac axis and SMA are patent. The portal vein and branches, splenic vein, SMV, and hepatic veins are patent.  No aortic artery aneurysm.       Bones/Soft Tissues: No significant finding.       Lower chest: Unremarkable.           Impression       No suspicious renal lesions.        Polycystic kidneys, with cysts throughout both kidneys.  The largest cysts within both kidneys have thin septations (Bosniak 2). No further follow-up is needed.                       Assessment: This is a 71 yo male with h/o HTN, Gout, CKD, DM, COPD, YANG/CPAP, OA, and with bilateral renal cysts that appear benign by interval MRI (Bosniak 2) and require no further follow-up. I reassured the pt and his wife of these findings. All questions were answered. Plan:      1.  F/U prn        Masha Roberts MD

## 2021-01-19 ENCOUNTER — APPOINTMENT (OUTPATIENT)
Dept: GENERAL RADIOLOGY | Age: 71
End: 2021-01-19
Payer: MEDICARE

## 2021-01-19 ENCOUNTER — APPOINTMENT (OUTPATIENT)
Dept: CT IMAGING | Age: 71
End: 2021-01-19
Payer: MEDICARE

## 2021-01-19 ENCOUNTER — HOSPITAL ENCOUNTER (EMERGENCY)
Age: 71
Discharge: ANOTHER HOSPITAL | End: 2021-01-20
Attending: EMERGENCY MEDICINE
Payer: MEDICARE

## 2021-01-19 DIAGNOSIS — G93.41 ACUTE METABOLIC ENCEPHALOPATHY: ICD-10-CM

## 2021-01-19 DIAGNOSIS — R41.82 ALTERED MENTAL STATUS, UNSPECIFIED ALTERED MENTAL STATUS TYPE: Primary | ICD-10-CM

## 2021-01-19 LAB
ALBUMIN SERPL-MCNC: 4.5 G/DL (ref 3.5–4.6)
ALP BLD-CCNC: 94 U/L (ref 35–104)
ALT SERPL-CCNC: 10 U/L (ref 0–41)
AMPHETAMINE SCREEN, URINE: NORMAL
ANION GAP SERPL CALCULATED.3IONS-SCNC: 13 MEQ/L (ref 9–15)
APTT: 25.3 SEC (ref 24.4–36.8)
AST SERPL-CCNC: 17 U/L (ref 0–40)
BARBITURATE SCREEN URINE: NORMAL
BASOPHILS ABSOLUTE: 0 K/UL (ref 0–0.2)
BASOPHILS RELATIVE PERCENT: 0.5 %
BENZODIAZEPINE SCREEN, URINE: NORMAL
BILIRUB SERPL-MCNC: 0.6 MG/DL (ref 0.2–0.7)
BILIRUBIN URINE: NEGATIVE
BLOOD, URINE: NEGATIVE
BUN BLDV-MCNC: 24 MG/DL (ref 8–23)
CALCIUM SERPL-MCNC: 10.2 MG/DL (ref 8.5–9.9)
CANNABINOID SCREEN URINE: NORMAL
CHLORIDE BLD-SCNC: 101 MEQ/L (ref 95–107)
CHP ED QC CHECK: YES
CLARITY: CLEAR
CO2: 23 MEQ/L (ref 20–31)
COCAINE METABOLITE SCREEN URINE: NORMAL
COLOR: YELLOW
CREAT SERPL-MCNC: 1.64 MG/DL (ref 0.7–1.2)
EKG ATRIAL RATE: 93 BPM
EKG P AXIS: 49 DEGREES
EKG P-R INTERVAL: 210 MS
EKG Q-T INTERVAL: 360 MS
EKG QRS DURATION: 116 MS
EKG QTC CALCULATION (BAZETT): 447 MS
EKG R AXIS: -51 DEGREES
EKG T AXIS: 79 DEGREES
EKG VENTRICULAR RATE: 93 BPM
EOSINOPHILS ABSOLUTE: 0 K/UL (ref 0–0.7)
EOSINOPHILS RELATIVE PERCENT: 0.4 %
GFR AFRICAN AMERICAN: 50.6
GFR NON-AFRICAN AMERICAN: 41.8
GLOBULIN: 3.5 G/DL (ref 2.3–3.5)
GLUCOSE BLD-MCNC: 162 MG/DL
GLUCOSE BLD-MCNC: 162 MG/DL (ref 60–115)
GLUCOSE BLD-MCNC: 182 MG/DL (ref 70–99)
GLUCOSE URINE: NEGATIVE MG/DL
HCT VFR BLD CALC: 46.6 % (ref 42–52)
HEMOGLOBIN: 15 G/DL (ref 14–18)
INFLUENZA A BY PCR: NEGATIVE
INFLUENZA B BY PCR: NEGATIVE
INR BLD: 1
KETONES, URINE: NEGATIVE MG/DL
LACTIC ACID: 1.8 MMOL/L (ref 0.5–2.2)
LACTIC ACID: 2.3 MMOL/L (ref 0.5–2.2)
LEUKOCYTE ESTERASE, URINE: NEGATIVE
LYMPHOCYTES ABSOLUTE: 1.3 K/UL (ref 1–4.8)
LYMPHOCYTES RELATIVE PERCENT: 19 %
Lab: NORMAL
MCH RBC QN AUTO: 28.6 PG (ref 27–31.3)
MCHC RBC AUTO-ENTMCNC: 32.1 % (ref 33–37)
MCV RBC AUTO: 88.9 FL (ref 80–100)
MONOCYTES ABSOLUTE: 0.7 K/UL (ref 0.2–0.8)
MONOCYTES RELATIVE PERCENT: 9.6 %
NEUTROPHILS ABSOLUTE: 4.9 K/UL (ref 1.4–6.5)
NEUTROPHILS RELATIVE PERCENT: 70.5 %
NITRITE, URINE: NEGATIVE
OPIATE SCREEN URINE: NORMAL
PDW BLD-RTO: 15.7 % (ref 11.5–14.5)
PERFORMED ON: ABNORMAL
PH UA: 5.5 (ref 5–9)
PHENCYCLIDINE SCREEN URINE: NORMAL
PLATELET # BLD: 172 K/UL (ref 130–400)
POTASSIUM REFLEX MAGNESIUM: 4.2 MEQ/L (ref 3.4–4.9)
PROCALCITONIN: 0.13 NG/ML (ref 0–0.15)
PROTEIN UA: NEGATIVE MG/DL
PROTHROMBIN TIME: 13.7 SEC (ref 12.3–14.9)
RBC # BLD: 5.24 M/UL (ref 4.7–6.1)
SARS-COV-2, NAAT: NOT DETECTED
SODIUM BLD-SCNC: 137 MEQ/L (ref 135–144)
SPECIFIC GRAVITY UA: 1.01 (ref 1–1.03)
TOTAL CK: 102 U/L (ref 0–190)
TOTAL PROTEIN: 8 G/DL (ref 6.3–8)
TRICYCLIC, URINE: NORMAL
TROPONIN: <0.01 NG/ML (ref 0–0.01)
TSH SERPL DL<=0.05 MIU/L-ACNC: 0.48 UIU/ML (ref 0.44–3.86)
URINE REFLEX TO CULTURE: NORMAL
UROBILINOGEN, URINE: 0.2 E.U./DL
WBC # BLD: 6.9 K/UL (ref 4.8–10.8)

## 2021-01-19 PROCEDURE — 84443 ASSAY THYROID STIM HORMONE: CPT

## 2021-01-19 PROCEDURE — 85025 COMPLETE CBC W/AUTO DIFF WBC: CPT

## 2021-01-19 PROCEDURE — 87070 CULTURE OTHR SPECIMN AEROBIC: CPT

## 2021-01-19 PROCEDURE — 87899 AGENT NOS ASSAY W/OPTIC: CPT

## 2021-01-19 PROCEDURE — 2580000003 HC RX 258: Performed by: EMERGENCY MEDICINE

## 2021-01-19 PROCEDURE — 84157 ASSAY OF PROTEIN OTHER: CPT

## 2021-01-19 PROCEDURE — 87205 SMEAR GRAM STAIN: CPT

## 2021-01-19 PROCEDURE — 82945 GLUCOSE OTHER FLUID: CPT

## 2021-01-19 PROCEDURE — 81003 URINALYSIS AUTO W/O SCOPE: CPT

## 2021-01-19 PROCEDURE — 87040 BLOOD CULTURE FOR BACTERIA: CPT

## 2021-01-19 PROCEDURE — 89050 BODY FLUID CELL COUNT: CPT

## 2021-01-19 PROCEDURE — 80053 COMPREHEN METABOLIC PANEL: CPT

## 2021-01-19 PROCEDURE — 99284 EMERGENCY DEPT VISIT MOD MDM: CPT

## 2021-01-19 PROCEDURE — 87502 INFLUENZA DNA AMP PROBE: CPT

## 2021-01-19 PROCEDURE — 84145 PROCALCITONIN (PCT): CPT

## 2021-01-19 PROCEDURE — 62270 DX LMBR SPI PNXR: CPT

## 2021-01-19 PROCEDURE — 85730 THROMBOPLASTIN TIME PARTIAL: CPT

## 2021-01-19 PROCEDURE — 82550 ASSAY OF CK (CPK): CPT

## 2021-01-19 PROCEDURE — 96365 THER/PROPH/DIAG IV INF INIT: CPT

## 2021-01-19 PROCEDURE — U0002 COVID-19 LAB TEST NON-CDC: HCPCS

## 2021-01-19 PROCEDURE — 96366 THER/PROPH/DIAG IV INF ADDON: CPT

## 2021-01-19 PROCEDURE — 71045 X-RAY EXAM CHEST 1 VIEW: CPT

## 2021-01-19 PROCEDURE — 6370000000 HC RX 637 (ALT 250 FOR IP): Performed by: EMERGENCY MEDICINE

## 2021-01-19 PROCEDURE — 70450 CT HEAD/BRAIN W/O DYE: CPT

## 2021-01-19 PROCEDURE — 93005 ELECTROCARDIOGRAM TRACING: CPT

## 2021-01-19 PROCEDURE — 85610 PROTHROMBIN TIME: CPT

## 2021-01-19 PROCEDURE — 80306 DRUG TEST PRSMV INSTRMNT: CPT

## 2021-01-19 PROCEDURE — 6360000002 HC RX W HCPCS: Performed by: EMERGENCY MEDICINE

## 2021-01-19 PROCEDURE — 84484 ASSAY OF TROPONIN QUANT: CPT

## 2021-01-19 PROCEDURE — 83605 ASSAY OF LACTIC ACID: CPT

## 2021-01-19 RX ORDER — 0.9 % SODIUM CHLORIDE 0.9 %
1000 INTRAVENOUS SOLUTION INTRAVENOUS ONCE
Status: COMPLETED | OUTPATIENT
Start: 2021-01-19 | End: 2021-01-20

## 2021-01-19 RX ORDER — SODIUM CHLORIDE 0.9 % (FLUSH) 0.9 %
10 SYRINGE (ML) INJECTION EVERY 12 HOURS SCHEDULED
Status: DISCONTINUED | OUTPATIENT
Start: 2021-01-19 | End: 2021-01-20

## 2021-01-19 RX ORDER — SODIUM CHLORIDE 0.9 % (FLUSH) 0.9 %
10 SYRINGE (ML) INJECTION PRN
Status: DISCONTINUED | OUTPATIENT
Start: 2021-01-19 | End: 2021-01-20

## 2021-01-19 RX ORDER — ACETAMINOPHEN 500 MG
1000 TABLET ORAL ONCE
Status: COMPLETED | OUTPATIENT
Start: 2021-01-19 | End: 2021-01-19

## 2021-01-19 RX ORDER — LIDOCAINE HYDROCHLORIDE 20 MG/ML
INJECTION, SOLUTION INFILTRATION; PERINEURAL
Status: DISCONTINUED
Start: 2021-01-19 | End: 2021-01-20 | Stop reason: HOSPADM

## 2021-01-19 RX ADMIN — ACETAMINOPHEN 1000 MG: 500 TABLET ORAL at 18:31

## 2021-01-19 RX ADMIN — PIPERACILLIN AND TAZOBACTAM 3.38 G: 3; .375 INJECTION, POWDER, LYOPHILIZED, FOR SOLUTION INTRAVENOUS at 18:30

## 2021-01-19 RX ADMIN — SODIUM CHLORIDE 1000 ML: 900 INJECTION, SOLUTION INTRAVENOUS at 20:20

## 2021-01-19 ASSESSMENT — PAIN SCALES - GENERAL: PAINLEVEL_OUTOF10: 0

## 2021-01-19 NOTE — ED NOTES
Pt straight cathed, urine obtained and sent to lab. Pt tolerated procedure with minimal amount of discomfort.       Doug Cogan, RN  01/19/21 [id number]

## 2021-01-19 NOTE — ED NOTES
Bed: 01  Expected date: 1/19/21  Expected time:   Means of arrival:   Comments:  71 y/o male, Hx of DM, . Pt confused today and not acting right per family. VS: 160/90, , 94% RA, A&O x 3. Family wanted Pt checked.      Sobia Ryan RN  01/19/21 0029

## 2021-01-19 NOTE — ED PROVIDER NOTES
HPI:  1/19/21, Time: 5:07 PM GRADY Yu is a 72 y.o. male presenting to the ED for acute mental status, beginning 1 day ago. The complaint has been persistent, moderate in severity, and worsened by nothing. No chest pain no shortness of breath no cough no abdominal pain no chest pain there is been no dysuria. He arrived via EMS from home. No one at home is sick no Covid symptoms per the wife    ROS:   Pertinent positives and negatives are stated within HPI, all other systems reviewed and are negative.  --------------------------------------------- PAST HISTORY ---------------------------------------------  Past Medical History:  has a past medical history of COPD (chronic obstructive pulmonary disease) ([facility] [address] Diabetes mellitus ([facility] [address] Hyperlipidemia, Hypertension, and Lung disease. Past Surgical History:  has a past surgical history that includes Thyroidectomy; Hand surgery (Right); Colonoscopy; pr colon ca scrn not hi rsk ind (N/A, 8/15/2018); and sigmoidoscopy (N/A, 5/21/2019). Social History:  reports that he quit smoking about 20 years ago. His smoking use included cigarettes. He started smoking about 52 years ago. He quit after 40.00 years of use. He has never used smokeless tobacco. He reports that he does not drink alcohol or use drugs. Family History: family history includes Colon Cancer in his brother; Coronary Art Dis in his brother, mother, and sister. The patients home medications have been reviewed. Allergies: Patient has no known allergies.     ---------------------------------------------------PHYSICAL EXAM--------------------------------------     Constitutional/General: Alert and oriented x1 well appearing, non toxic in NAD  Head: Normocephalic and atraumatic  Eyes: PERRL, EOMI  Mouth: Oropharynx clear, handling secretions, no trismus  Neck: Supple, full ROM, non tender to palpation in the midline, no stridor, no crepitus, no meningeal signs  Pulmonary: Lungs clear to auscultation bilaterally, no wheezes, rales, or rhonchi. Not in respiratory distress  Cardiovascular:  Regular rate. Regular rhythm. No murmurs, gallops, or rubs. 2+ distal pulses  Chest: no chest wall tenderness  Abdomen: Soft. Non tender. Non distended. +BS. No rebound, guarding, or rigidity. No pulsatile masses appreciated. Musculoskeletal: Moves all extremities x 4. Warm and well perfused, no clubbing, cyanosis, or edema. Capillary refill <3 seconds  Skin: warm and dry. No rashes. Neurologic: GCS 15, CN 2-12 grossly intact, no focal deficits, symmetric strength 5/5 in the upper and lower extremities bilaterally  Psych: Flat affect    -------------------------------------------------- RESULTS -------------------------------------------------  I have personally reviewed all laboratory and imaging results for this patient. Results are listed below. LABS:  Results for orders placed or performed during the hospital encounter of 01/19/21   Culture, Blood 1    Specimen: Blood   Result Value Ref Range    Blood Culture, Routine       No Growth to date. Any change in status will be called. Culture, Blood 2    Specimen: Blood   Result Value Ref Range    Culture, Blood 2       No Growth to date. Any change in status will be called.    Rapid Influenza A/B Antigens    Specimen: Nasopharyngeal   Result Value Ref Range    Influenza A by PCR Negative     Influenza B by PCR Negative    Culture, CSF    Specimen: CSF   Result Value Ref Range    Gram Stain Result No WBC's, No organisms seen    Urine Reflex to Culture    Specimen: Urine, clean catch   Result Value Ref Range    Color, UA Yellow Straw/Yellow    Clarity, UA Clear Clear    Glucose, Ur Negative Negative mg/dL    Bilirubin Urine Negative Negative    Ketones, Urine Negative Negative mg/dL    Specific Gravity, UA 1.015 1.005 - 1.030    Blood, Urine Negative Negative    pH, UA 5.5 5.0 - 9.0    Protein, UA Negative Negative mg/dL    Urobilinogen, Urine 0. 2 <2.0 E.U./dL    Nitrite, Urine Negative Negative    Leukocyte Esterase, Urine Negative Negative    Urine Reflex to Culture Not Indicated    COVID-19   Result Value Ref Range    SARS-CoV-2, NAAT Not Detected Not Detected   CBC Auto Differential   Result Value Ref Range    WBC 6.9 4.8 - 10.8 K/uL    RBC 5.24 4.70 - 6.10 M/uL    Hemoglobin 15.0 14.0 - 18.0 g/dL    Hematocrit 46.6 42.0 - 52.0 %    MCV 88.9 80.0 - 100.0 fL    MCH 28.6 27.0 - 31.3 pg    MCHC 32.1 (L) 33.0 - 37.0 %    RDW 15.7 (H) 11.5 - 14.5 %    Platelets 414 448 - 041 K/uL    Neutrophils % 70.5 %    Lymphocytes % 19.0 %    Monocytes % 9.6 %    Eosinophils % 0.4 %    Basophils % 0.5 %    Neutrophils Absolute 4.9 1.4 - 6.5 K/uL    Lymphocytes Absolute 1.3 1.0 - 4.8 K/uL    Monocytes Absolute 0.7 0.2 - 0.8 K/uL    Eosinophils Absolute 0.0 0.0 - 0.7 K/uL    Basophils Absolute 0.0 0.0 - 0.2 K/uL   Comprehensive Metabolic Panel w/ Reflex to MG   Result Value Ref Range    Sodium 137 135 - 144 mEq/L    Potassium reflex Magnesium 4.2 3.4 - 4.9 mEq/L    Chloride 101 95 - 107 mEq/L    CO2 23 20 - 31 mEq/L    Anion Gap 13 9 - 15 mEq/L    Glucose 182 (H) 70 - 99 mg/dL    BUN 24 (H) 8 - 23 mg/dL    CREATININE 1.64 (H) 0.70 - 1.20 mg/dL    GFR Non-African American 41.8 (L) >60    GFR  50.6 (L) >60    Calcium 10.2 (H) 8.5 - 9.9 mg/dL    Total Protein 8.0 6.3 - 8.0 g/dL    Alb 4.5 3.5 - 4.6 g/dL    Total Bilirubin 0.6 0.2 - 0.7 mg/dL    Alkaline Phosphatase 94 35 - 104 U/L    ALT 10 0 - 41 U/L    AST 17 0 - 40 U/L    Globulin 3.5 2.3 - 3.5 g/dL   Troponin   Result Value Ref Range    Troponin <0.010 0.000 - 0.010 ng/mL   CK   Result Value Ref Range    Total  0 - 190 U/L   APTT   Result Value Ref Range    aPTT 25.3 24.4 - 36.8 sec   Protime-INR   Result Value Ref Range    Protime 13.7 12.3 - 14.9 sec    INR 1.0    Lactic Acid, Plasma   Result Value Ref Range    Lactic Acid 2.3 (H) 0.5 - 2.2 mmol/L   PROCALCITONIN   Result Value Ref Range Procalcitonin 0.13 0.00 - 0.15 ng/mL   TSH without Reflex   Result Value Ref Range    TSH 0.479 0.440 - 3.860 uIU/mL   Urine Drug Screen, Comprehensive   Result Value Ref Range    PCP Screen, Urine Neg Negative <25 ng/mL    Benzodiazepine Screen, Urine Neg Negative <150 ng/mL    Cocaine Metabolite Screen, Urine Neg Negative <150 ng/mL    Amphetamine Screen, Urine Neg Negative <500 ng/mL    Cannabinoid Scrn, Ur Neg Negative <50 ng/mL    Opiate Scrn, Ur Neg Negative <100 ng/mL    Barbiturate Screen, Ur Neg Negative <561 ng/mL    Tricyclic Neg Negative <353 ng/mL    Drug Screen Comment: see below    Lactic Acid, Plasma   Result Value Ref Range    Lactic Acid 1.8 0.5 - 2.2 mmol/L   CSF Cell Count with Differential   Result Value Ref Range    Color, CSF Colorless     Appearance, CSF Clear     Tube Number + CELL CT + DIFF-CSF Tube 4     Clot Evaluation CSF see below     WBC, CSF 23 (HH) 0 - 8 K/uL    RBC,  K/uL    Neutrophils, CSF 1 %    Lymphs, CSF 92 %    Monocytes, CSF 4 %    Eosinophils, CSF 3 %    Path Consult CSF Yes     Volume Csf 4.5 mL   Protein, CSF   Result Value Ref Range    Protein, CSF 71 (H) 15 - 45 mg/dL   Glucose, CSF   Result Value Ref Range    Glucose, CSF 96 (H) 50 - 70 mg/dL   Cryptococcal AG Reflex to Titer   Result Value Ref Range    Crypto Ag, CSF Negative Negative   POCT Glucose   Result Value Ref Range    Glucose 162 mg/dL    QC OK? yes    POCT Glucose   Result Value Ref Range    POC Glucose 162 (H) 60 - 115 mg/dl    Performed on ACCU-CHEK    EKG 12 Lead   Result Value Ref Range    Ventricular Rate 93 BPM    Atrial Rate 93 BPM    P-R Interval 210 ms    QRS Duration 116 ms    Q-T Interval 360 ms    QTc Calculation (Bazett) 447 ms    P Axis 49 degrees    R Axis -51 degrees    T Axis 79 degrees       RADIOLOGY:  Interpreted by Radiologist.  CT HEAD WO CONTRAST   Final Result      NO ACUTE INTRACRANIAL PROCESS IDENTIFIED.                   XR CHEST PORTABLE   Final Result   No acute cardiopulmonary process seen. EKG Interpretation  Interpreted by emergency department physician    Rhythm: normal sinus   Rate: normal  Axis: normal  Conduction: normal  ST Segments: no acute change  T Waves: no acute change    Clinical Impression: no acute changes  Comparison to prior EKG: no previous EKG      ------------------------- NURSING NOTES AND VITALS REVIEWED ---------------------------   The nursing notes within the ED encounter and vital signs as below have been reviewed by myself. /83   Pulse 89   Temp 99 °F (37.2 °C) (Tympanic)   Resp 14   Ht 6' 6\" (1.981 m)   Wt 220 lb (99.8 kg)   SpO2 97%   BMI 25.42 kg/m²   Oxygen Saturation Interpretation: Normal    The patients available past medical records and past encounters were reviewed. ------------------------------ ED COURSE/MEDICAL DECISION MAKING----------------------  Medications   piperacillin-tazobactam (ZOSYN) 3.375 g in dextrose 5 % 50 mL IVPB (mini-bag) (0 g Intravenous Stopped 1/19/21 2020)   acetaminophen (TYLENOL) tablet 1,000 mg (1,000 mg Oral Given 1/19/21 1831)   0.9 % sodium chloride bolus (0 mLs Intravenous Stopped 1/20/21 0139)             Medical Decision Making:    Patient was pancultured. I have ordered influenza and Covid testing as well he was started on Zosyn intravenously I have ordered lactic acid and blood cultures  Lab work is unremarkable as well as urinalysis. CT scan shows no acute changes  Was endorsed to Dr. Elizabeth Muller at 7pm     Re-Evaluations:             Re-evaluation. Patients symptoms show no change      Consultations:             Phone consult placed to the hospitalist    Critical Care:          This patient's ED course included: re-evaluation prior to disposition, IV medications, cardiac monitoring, continuous pulse oximetry and a personal history and physicial eaxmination    This patient has remained hemodynamically stable and been closely monitored during their ED course. Counseling: The emergency provider has spoken with the patient and spouse/SO and discussed todays results, in addition to providing specific details for the plan of care and counseling regarding the diagnosis and prognosis. Questions are answered at this time and they are agreeable with the plan.       --------------------------------- IMPRESSION AND DISPOSITION ---------------------------------    IMPRESSION  1. Altered mental status, unspecified altered mental status type    2. Acute metabolic encephalopathy        DISPOSITION  Disposition: Admit to med/surg floor  Patient condition is fair        NOTE: This report was transcribed using voice recognition software.  Every effort was made to ensure accuracy; however, inadvertent computerized transcription errors may be present          Ling Bourne MD  01/21/21 5649

## 2021-01-19 NOTE — ED TRIAGE NOTES
Pt from home via EMS for confusion, Pt was normal at home last night per family. Pt has dilated Right pupil, non-reactive. Pt following commands.

## 2021-01-20 ENCOUNTER — HOSPITAL ENCOUNTER (INPATIENT)
Age: 71
LOS: 3 days | Discharge: HOME | DRG: 098 | End: 2021-01-23
Attending: INTERNAL MEDICINE | Admitting: INTERNAL MEDICINE
Payer: MEDICARE

## 2021-01-20 VITALS
BODY MASS INDEX: 25.45 KG/M2 | TEMPERATURE: 99 F | HEART RATE: 89 BPM | DIASTOLIC BLOOD PRESSURE: 83 MMHG | SYSTOLIC BLOOD PRESSURE: 129 MMHG | RESPIRATION RATE: 14 BRPM | WEIGHT: 220 LBS | HEIGHT: 78 IN | OXYGEN SATURATION: 97 %

## 2021-01-20 PROBLEM — R41.82 AMS (ALTERED MENTAL STATUS): Status: ACTIVE | Noted: 2021-01-20

## 2021-01-20 LAB
ALBUMIN SERPL-MCNC: 3.7 G/DL (ref 3.5–4.6)
ALP BLD-CCNC: 87 U/L (ref 35–104)
ALT SERPL-CCNC: 10 U/L (ref 0–41)
ANION GAP SERPL CALCULATED.3IONS-SCNC: 14 MEQ/L (ref 9–15)
APPEARANCE CSF: CLEAR
AST SERPL-CCNC: 17 U/L (ref 0–40)
BASOPHILS ABSOLUTE: 0.1 K/UL (ref 0–0.2)
BASOPHILS RELATIVE PERCENT: 1 %
BILIRUB SERPL-MCNC: 0.5 MG/DL (ref 0.2–0.7)
BUN BLDV-MCNC: 20 MG/DL (ref 8–23)
CALCIUM SERPL-MCNC: 9.7 MG/DL (ref 8.5–9.9)
CHLORIDE BLD-SCNC: 105 MEQ/L (ref 95–107)
CLOT EVALUATION CSF: ABNORMAL
CO2: 20 MEQ/L (ref 20–31)
COLOR CSF: COLORLESS
CREAT SERPL-MCNC: 1.41 MG/DL (ref 0.7–1.2)
CRYPTOCOCCAL ANTIGEN CSF: NEGATIVE
EOS CSF: 3 %
EOSINOPHILS ABSOLUTE: 0.1 K/UL (ref 0–0.7)
EOSINOPHILS RELATIVE PERCENT: 1.1 %
GFR AFRICAN AMERICAN: >60
GFR NON-AFRICAN AMERICAN: 49.8
GLOBULIN: 3.4 G/DL (ref 2.3–3.5)
GLUCOSE BLD-MCNC: 115 MG/DL (ref 60–115)
GLUCOSE BLD-MCNC: 122 MG/DL (ref 60–115)
GLUCOSE BLD-MCNC: 156 MG/DL (ref 60–115)
GLUCOSE BLD-MCNC: 160 MG/DL (ref 60–115)
GLUCOSE BLD-MCNC: 172 MG/DL (ref 70–99)
GLUCOSE, CSF: 96 MG/DL (ref 50–70)
HBA1C MFR BLD: 9.5 % (ref 4.8–5.9)
HCT VFR BLD CALC: 42.5 % (ref 42–52)
HEMOGLOBIN: 14.4 G/DL (ref 14–18)
LACTIC ACID: 1.6 MMOL/L (ref 0.5–2.2)
LYMPHOCYTES ABSOLUTE: 1.3 K/UL (ref 1–4.8)
LYMPHOCYTES RELATIVE PERCENT: 22.8 %
LYMPHS CSF: 92 %
MCH RBC QN AUTO: 29.3 PG (ref 27–31.3)
MCHC RBC AUTO-ENTMCNC: 33.9 % (ref 33–37)
MCV RBC AUTO: 86.7 FL (ref 80–100)
MONOCYTE, CSF: 4 %
MONOCYTES ABSOLUTE: 0.8 K/UL (ref 0.2–0.8)
MONOCYTES RELATIVE PERCENT: 14.2 %
NEUTROPHILS ABSOLUTE: 3.4 K/UL (ref 1.4–6.5)
NEUTROPHILS RELATIVE PERCENT: 60.9 %
NEUTROPHILS, CSF: 1 %
PATH CONSULT CSF: YES
PDW BLD-RTO: 16 % (ref 11.5–14.5)
PERFORMED ON: ABNORMAL
PERFORMED ON: NORMAL
PLATELET # BLD: 161 K/UL (ref 130–400)
POTASSIUM REFLEX MAGNESIUM: 4.1 MEQ/L (ref 3.4–4.9)
PROTEIN CSF: 71 MG/DL (ref 15–45)
RBC # BLD: 4.91 M/UL (ref 4.7–6.1)
RBC CSF: 106 K/UL
SODIUM BLD-SCNC: 139 MEQ/L (ref 135–144)
TOTAL PROTEIN: 7.1 G/DL (ref 6.3–8)
TUBE NUMBER CSF: ABNORMAL
VOLUME CSF: 4.5 ML
WBC # BLD: 5.6 K/UL (ref 4.8–10.8)
WBC CSF: 23 K/UL (ref 0–8)

## 2021-01-20 PROCEDURE — 85025 COMPLETE CBC W/AUTO DIFF WBC: CPT

## 2021-01-20 PROCEDURE — 1210000000 HC MED SURG R&B

## 2021-01-20 PROCEDURE — 83036 HEMOGLOBIN GLYCOSYLATED A1C: CPT

## 2021-01-20 PROCEDURE — 99222 1ST HOSP IP/OBS MODERATE 55: CPT | Performed by: INTERNAL MEDICINE

## 2021-01-20 PROCEDURE — 80053 COMPREHEN METABOLIC PANEL: CPT

## 2021-01-20 PROCEDURE — 2580000003 HC RX 258: Performed by: INTERNAL MEDICINE

## 2021-01-20 PROCEDURE — 86592 SYPHILIS TEST NON-TREP QUAL: CPT

## 2021-01-20 PROCEDURE — 2500000003 HC RX 250 WO HCPCS: Performed by: INTERNAL MEDICINE

## 2021-01-20 PROCEDURE — 6360000002 HC RX W HCPCS: Performed by: NURSE PRACTITIONER

## 2021-01-20 PROCEDURE — 83605 ASSAY OF LACTIC ACID: CPT

## 2021-01-20 PROCEDURE — 93010 ELECTROCARDIOGRAM REPORT: CPT | Performed by: INTERNAL MEDICINE

## 2021-01-20 PROCEDURE — 36415 COLL VENOUS BLD VENIPUNCTURE: CPT

## 2021-01-20 PROCEDURE — 6360000002 HC RX W HCPCS: Performed by: INTERNAL MEDICINE

## 2021-01-20 PROCEDURE — 2580000003 HC RX 258: Performed by: NURSE PRACTITIONER

## 2021-01-20 RX ORDER — DEXTROSE MONOHYDRATE 25 G/50ML
12.5 INJECTION, SOLUTION INTRAVENOUS PRN
Status: DISCONTINUED | OUTPATIENT
Start: 2021-01-20 | End: 2021-01-23 | Stop reason: HOSPADM

## 2021-01-20 RX ORDER — POLYETHYLENE GLYCOL 3350 17 G/17G
17 POWDER, FOR SOLUTION ORAL DAILY PRN
Status: DISCONTINUED | OUTPATIENT
Start: 2021-01-20 | End: 2021-01-23 | Stop reason: HOSPADM

## 2021-01-20 RX ORDER — ACETAMINOPHEN 650 MG/1
650 SUPPOSITORY RECTAL EVERY 6 HOURS PRN
Status: DISCONTINUED | OUTPATIENT
Start: 2021-01-20 | End: 2021-01-23 | Stop reason: HOSPADM

## 2021-01-20 RX ORDER — LABETALOL HYDROCHLORIDE 5 MG/ML
10 INJECTION, SOLUTION INTRAVENOUS EVERY 4 HOURS PRN
Status: DISCONTINUED | OUTPATIENT
Start: 2021-01-20 | End: 2021-01-23 | Stop reason: HOSPADM

## 2021-01-20 RX ORDER — ONDANSETRON 2 MG/ML
4 INJECTION INTRAMUSCULAR; INTRAVENOUS EVERY 6 HOURS PRN
Status: DISCONTINUED | OUTPATIENT
Start: 2021-01-20 | End: 2021-01-23 | Stop reason: HOSPADM

## 2021-01-20 RX ORDER — SODIUM CHLORIDE 0.9 % (FLUSH) 0.9 %
10 SYRINGE (ML) INJECTION PRN
Status: DISCONTINUED | OUTPATIENT
Start: 2021-01-19 | End: 2021-01-20 | Stop reason: HOSPADM

## 2021-01-20 RX ORDER — SODIUM CHLORIDE 9 MG/ML
INJECTION, SOLUTION INTRAVENOUS CONTINUOUS
Status: DISCONTINUED | OUTPATIENT
Start: 2021-01-20 | End: 2021-01-23 | Stop reason: HOSPADM

## 2021-01-20 RX ORDER — DEXTROSE MONOHYDRATE 50 MG/ML
100 INJECTION, SOLUTION INTRAVENOUS PRN
Status: DISCONTINUED | OUTPATIENT
Start: 2021-01-20 | End: 2021-01-23 | Stop reason: HOSPADM

## 2021-01-20 RX ORDER — PROMETHAZINE HYDROCHLORIDE 12.5 MG/1
12.5 TABLET ORAL EVERY 6 HOURS PRN
Status: DISCONTINUED | OUTPATIENT
Start: 2021-01-20 | End: 2021-01-23 | Stop reason: HOSPADM

## 2021-01-20 RX ORDER — ACETAMINOPHEN 325 MG/1
650 TABLET ORAL EVERY 6 HOURS PRN
Status: DISCONTINUED | OUTPATIENT
Start: 2021-01-20 | End: 2021-01-23 | Stop reason: HOSPADM

## 2021-01-20 RX ORDER — SODIUM CHLORIDE 0.9 % (FLUSH) 0.9 %
10 SYRINGE (ML) INJECTION EVERY 12 HOURS SCHEDULED
Status: DISCONTINUED | OUTPATIENT
Start: 2021-01-20 | End: 2021-01-20 | Stop reason: HOSPADM

## 2021-01-20 RX ORDER — SODIUM CHLORIDE 0.9 % (FLUSH) 0.9 %
10 SYRINGE (ML) INJECTION PRN
Status: DISCONTINUED | OUTPATIENT
Start: 2021-01-20 | End: 2021-01-23 | Stop reason: HOSPADM

## 2021-01-20 RX ORDER — SODIUM CHLORIDE 0.9 % (FLUSH) 0.9 %
10 SYRINGE (ML) INJECTION EVERY 12 HOURS SCHEDULED
Status: DISCONTINUED | OUTPATIENT
Start: 2021-01-20 | End: 2021-01-23 | Stop reason: HOSPADM

## 2021-01-20 RX ORDER — NICOTINE POLACRILEX 4 MG
15 LOZENGE BUCCAL PRN
Status: DISCONTINUED | OUTPATIENT
Start: 2021-01-20 | End: 2021-01-23 | Stop reason: HOSPADM

## 2021-01-20 RX ADMIN — ENOXAPARIN SODIUM 40 MG: 40 INJECTION SUBCUTANEOUS at 11:15

## 2021-01-20 RX ADMIN — SODIUM CHLORIDE, PRESERVATIVE FREE 10 ML: 5 INJECTION INTRAVENOUS at 20:44

## 2021-01-20 RX ADMIN — ACYCLOVIR SODIUM 500 MG: 50 INJECTION, SOLUTION INTRAVENOUS at 11:15

## 2021-01-20 RX ADMIN — SODIUM CHLORIDE, PRESERVATIVE FREE 10 ML: 5 INJECTION INTRAVENOUS at 11:15

## 2021-01-20 RX ADMIN — AMPICILLIN SODIUM 2000 MG: 2 INJECTION, POWDER, FOR SOLUTION INTRAMUSCULAR; INTRAVENOUS at 12:39

## 2021-01-20 RX ADMIN — VANCOMYCIN HYDROCHLORIDE 1500 MG: 5 INJECTION, POWDER, LYOPHILIZED, FOR SOLUTION INTRAVENOUS at 11:19

## 2021-01-20 RX ADMIN — LABETALOL HYDROCHLORIDE 10 MG: 5 INJECTION, SOLUTION INTRAVENOUS at 21:03

## 2021-01-20 RX ADMIN — CEFTRIAXONE SODIUM 2000 MG: 2 INJECTION, POWDER, FOR SOLUTION INTRAMUSCULAR; INTRAVENOUS at 06:23

## 2021-01-20 RX ADMIN — ACYCLOVIR SODIUM 500 MG: 50 INJECTION, SOLUTION INTRAVENOUS at 20:44

## 2021-01-20 RX ADMIN — SODIUM CHLORIDE: 9 INJECTION, SOLUTION INTRAVENOUS at 03:53

## 2021-01-20 RX ADMIN — AMPICILLIN SODIUM 2000 MG: 2 INJECTION, POWDER, FOR SOLUTION INTRAMUSCULAR; INTRAVENOUS at 07:09

## 2021-01-20 ASSESSMENT — ENCOUNTER SYMPTOMS
ANAL BLEEDING: 0
COUGH: 0
ABDOMINAL PAIN: 0
WHEEZING: 0
SORE THROAT: 0
COLOR CHANGE: 0
SHORTNESS OF BREATH: 0
ABDOMINAL DISTENTION: 0
PHOTOPHOBIA: 0
DIARRHEA: 0
NAUSEA: 0
SINUS PRESSURE: 0
RESPIRATORY NEGATIVE: 1
EYES NEGATIVE: 1
VOMITING: 0
GASTROINTESTINAL NEGATIVE: 1
APNEA: 0
CONSTIPATION: 0
BACK PAIN: 0
SHORTNESS OF BREATH: 0
EYE PAIN: 0
RHINORRHEA: 0

## 2021-01-20 ASSESSMENT — PAIN SCALES - GENERAL: PAINLEVEL_OUTOF10: 0

## 2021-01-20 NOTE — ED NOTES
[facility] called with bed assignment. Patient will go to [id number] [facility] room 490, Nurse to nurse report number will be [phone]. Evangelina Car. Rian Lennox  01/20/21 0009

## 2021-01-20 NOTE — PROGRESS NOTES
Assessment completed this shift. Alert and oriented to person and situation. Delayed responses. Denies pain or nausea. Bathed with assistance from [place], [place]. In bed with call light in reach.   Electronically signed by Dimitrios Vanegas RN on 1/20/2021 at 3:36 PM

## 2021-01-20 NOTE — ED NOTES
[facility] called, spoke with Amanda Jo. She stated it will be an hour to hour and a half approx for transport. Darlyn Fisher.  [facility]  01/20/21 6881

## 2021-01-20 NOTE — PROGRESS NOTES
PHARMACY NOTE:   Vancomycin therapy discontinued by Dr. Jaron Crouch   [facility] consult, placeholder, further trough levels   Will close [facility]    If pharmacy is to continue to follow vancomycin therapy, will need to be re-consulted. Thanks!      Angelita Serrano, PharmD   1/20/2021 2:33 PM

## 2021-01-20 NOTE — ED PROVIDER NOTES
[address] [place] [address] ED  eMERGENCY dEPARTMENT eNCOUnter      Pt Name: Vanessa Odom  MRN: [id number]  [place] 1950  Date of evaluation: 1/19/2021  Provider: Alejandro Dubon MD    CHIEF COMPLAINT       Chief Complaint   Patient presents with    Altered Mental Status     Pt not acting right per family, Hx of DM, confsuion         HISTORY OF PRESENT ILLNESS   (Location/Symptom, Timing/Onset,Context/Setting, Quality, Duration, Modifying Factors, Severity)  Note limiting factors. Vanessa Odom is a 72 y.o. male who presents to the emergency department with complaint of mental status changes, fever since last night. Patient denies alcohol abuse or drug use. Denies cough or expectoration. Denies chest pain or shortness of breath. Denies dysuria. Denies pain or discomfort. Comorbid conditions include obstructive sleep apnea, type 2 diabetes, asthma COPD, hypertension, hyperlipidemia, glaucoma of right eye. Denies neck pain. HPI    Nursing Notes were reviewed. REVIEW OF SYSTEMS    (2-9 systems for level 4, 10 or more for level 5)     Review of Systems   Constitutional: Positive for activity change, appetite change, fatigue and fever. Negative for chills. HENT: Negative for congestion, ear discharge, ear pain, hearing loss, rhinorrhea, sinus pressure and sore throat. Eyes: Negative for photophobia, pain and visual disturbance. Respiratory: Negative for apnea, cough, shortness of breath and wheezing. Cardiovascular: Negative for chest pain, palpitations and leg swelling. Gastrointestinal: Negative for abdominal distention, abdominal pain, constipation, diarrhea, nausea and vomiting. Endocrine: Negative for cold intolerance, heat intolerance and polyuria. Genitourinary: Negative for dysuria, flank pain, frequency and urgency. Musculoskeletal: Negative for arthralgias, back pain, gait problem, myalgias and neck stiffness. Skin: Negative for color change, pallor and rash. Allergic/Immunologic: Negative for food allergies and immunocompromised state. Neurological: Positive for weakness. Negative for dizziness, tremors, syncope, light-headedness and headaches. Psychiatric/Behavioral: Positive for confusion. Negative for agitation, decreased concentration, hallucinations, sleep disturbance and suicidal ideas. The patient is not hyperactive. All other systems reviewed and are negative. Except as noted above the remainder of the review of systems was reviewed and negative.        PAST MEDICAL HISTORY     Past Medical History:   Diagnosis Date    COPD [facility]chronic obstructive pulmonary disease) ([facility] [address]     Diabetes mellitus ([facility]     Hyperlipidemia     Hypertension     Lung disease          SURGICAL HISTORY       Past Surgical History:   Procedure Laterality Date    COLONOSCOPY      HAND SURGERY Right     [place] COLON CA SCRN NOT HI RSK IND N/A 8/15/2018    COLONOSCOPY performed by Edgar Henry MD at [facility] 5/21/2019    SIGMOIDOSCOPY W/ DILATION performed by Edgar Henry MD at [facility]       Discharge Medication List as of 1/20/2021  1:57 AM      CONTINUE these medications which have NOT CHANGED    Details   metFORMIN (GLUCOPHAGE) 500 MG tablet Historical Med      ACCU-CHEK SALVADOR PLUS strip DAWHistorical Med      ACCU-CHEK SOFTCLIX LANCETS MISC Starting Mon 9/10/2018, Historical Med      Respiratory Therapy Supplies SOURAV Disp-1 Device, R-0, PrintChange CPAP pressure to 6 cm  New CPAP mask and supplies      gabapentin (NEURONTIN) 300 MG capsule Historical Med      levalbuterol (XOPENEX HFA) 45 MCG/ACT inhaler Inhale 2 puffs into the lungs every 4 hours as needed for Wheezing      naproxen (NAPROSYN) 500 MG tablet Take 500 mg by mouth 2 times daily (with meals)      brimonidine (ALPHAGAN) 0.2 % ophthalmic solution Historical Med      lovastatin (MEVACOR) 40 MG tablet Historical Med levothyroxine (SYNTHROID) 100 MCG tablet Historical Med      clonazePAM (KLONOPIN) 2 MG tablet Historical Med      allopurinol (ZYLOPRIM) 300 MG tablet Historical Med      lisinopril (PRINIVIL;ZESTRIL) 2.5 MG tablet Historical Med      traMADol (ULTRAM) 50 MG tablet Take 50 mg by mouth as needed for Pain      predniSONE (DELTASONE) 10 MG tablet Take 10 mg by mouth daily             ALLERGIES     Patient has no known allergies.     FAMILY HISTORY       Family History   Problem Relation Age of Onset    Coronary Art Dis Mother     Coronary Art Dis Sister     Colon Cancer Brother     Coronary Art Dis Brother           SOCIAL HISTORY       Social History     Socioeconomic History    Marital status:      Spouse name: Not on file    Number of children: Not on file    Years of education: Not on file    Highest education level: Not on file   Occupational History    Not on file   Social Needs    Financial resource strain: Not on file    Food insecurity     Worry: Not on file     Inability: Not on file    Transportation needs     Medical: Not on file     Non-medical: Not on file   Tobacco Use    Smoking status: Former Smoker     Years: 40.00     Types: Cigarettes     Start date: 1968     Quit date: 2000     Years since quittin.6    Smokeless tobacco: Never Used   Substance and Sexual Activity    Alcohol use: No     Alcohol/week: 0.0 standard drinks    Drug use: No    Sexual activity: Not on file   Lifestyle    Physical activity     Days per week: Not on file     Minutes per session: Not on file    Stress: Not on file   Relationships    Social connections     Talks on phone: Not on file     Gets together: Not on file     Attends Quaker service: Not on file     Active member of club or organization: Not on file     Attends meetings of clubs or organizations: Not on file     Relationship status: Not on file    Intimate partner violence     Fear of current or ex partner: Not on file Emotionally abused: Not on file     Physically abused: Not on file     Forced sexual activity: Not on file   Other Topics Concern    Not on file   Social History Narrative    Not on file       SCREENINGS             PHYSICAL EXAM    (up to 7 for level 4, 8 or more for level 5)     ED Triage Vitals [01/19/21 1648]   BP Temp Temp Source Pulse Resp SpO2 Height Weight   (!) 159/92 100.3 °F (37.9 °C) Oral 96 18 94 % 6' 6\" (1.981 m) 220 lb (99.8 kg)       Physical Exam  Vitals signs and nursing note reviewed. Constitutional:       General: He is not in acute distress. Appearance: He is well-developed and normal weight. He is ill-appearing. He is not toxic-appearing or diaphoretic. HENT:      Head: Normocephalic and atraumatic. Nose: Nose normal.      Mouth/Throat:      Mouth: Mucous membranes are moist.      Pharynx: No oropharyngeal exudate. Eyes:      General: No visual field deficit or scleral icterus. Right eye: No discharge. Left eye: No discharge. Extraocular Movements: Extraocular movements intact. Right eye: Normal extraocular motion and no nystagmus. Left eye: Normal extraocular motion and no nystagmus. Conjunctiva/sclera: Conjunctivae normal.      Pupils: Pupils are equal, round, and reactive to light. Pupils are equal.      Right eye: Pupil is round and reactive. Left eye: Pupil is round and reactive. Neck:      Musculoskeletal: Normal range of motion and neck supple. No neck rigidity. Thyroid: No thyromegaly. Vascular: No JVD. Trachea: No tracheal deviation. Meningeal: Brudzinski's sign and Kernig's sign absent. Cardiovascular:      Rate and Rhythm: Normal rate and regular rhythm. Heart sounds: Normal heart sounds. No murmur. No friction rub. No gallop. Pulmonary:      Effort: Pulmonary effort is normal. No respiratory distress. Breath sounds: Normal breath sounds. No stridor. No wheezing, rhonchi or rales.    Chest: Chest wall: No tenderness. Abdominal:      General: Bowel sounds are normal. There is no distension. Palpations: Abdomen is soft. There is no mass. Tenderness: There is no abdominal tenderness. There is no guarding or rebound. Musculoskeletal: Normal range of motion. General: No swelling, tenderness or deformity. Lymphadenopathy:      Cervical: No cervical adenopathy. Skin:     General: Skin is warm and dry. Capillary Refill: Capillary refill takes less than 2 seconds. Coloration: Skin is not cyanotic or pale. Findings: No erythema or rash. Neurological:      Mental Status: He is alert and oriented to person, place, and time. He is confused. GCS: GCS eye subscore is 4. GCS verbal subscore is 5. GCS motor subscore is 6. Cranial Nerves: No cranial nerve deficit, dysarthria or facial asymmetry. Sensory: No sensory deficit. Motor: Weakness present. No abnormal muscle tone. Coordination: Romberg sign negative. Coordination normal.      Gait: Gait abnormal.      Deep Tendon Reflexes: Reflexes are normal and symmetric. Reflexes normal. Babinski sign absent on the right side. Babinski sign absent on the left side. Psychiatric:         Mood and Affect: Mood is depressed. Mood is not anxious. Speech: Speech normal.         Behavior: Behavior normal. Behavior is not agitated. Thought Content: Thought content normal.         Cognition and Memory: Cognition is not impaired. Memory is not impaired.          Judgment: Judgment normal.         DIAGNOSTIC RESULTS     EKG: All EKG's are interpreted by the Emergency Department Physician who either signs or Co-signs this chart in the absence of a cardiologist.        RADIOLOGY:   Non-plain film images such as CT, Ultrasound and MRI are read by the radiologist. Nuha Betts radiographicimages are visualized and preliminarily interpreted by the emergency physician with the below findings:        Interpretation per the Radiologist below, if available at the time of this note:    CT HEAD WO CONTRAST   Final Result      NO ACUTE INTRACRANIAL PROCESS IDENTIFIED.                   XR CHEST PORTABLE    (Results Pending)         ED BEDSIDE ULTRASOUND:   Performed by ED Physician - none    LABS:  Labs Reviewed   CBC WITH AUTO DIFFERENTIAL - Abnormal; Notable for the following components:       Result Value    MCHC 32.1 (*)     RDW 15.7 (*)     All other components within normal limits   COMPREHENSIVE METABOLIC PANEL W/ REFLEX TO MG FOR LOW K - Abnormal; Notable for the following components:    Glucose 182 (*)     BUN 24 (*)     CREATININE 1.64 (*)     GFR Non- 41.8 (*)     GFR  50.6 (*)     Calcium 10.2 (*)     All other components within normal limits   LACTIC ACID, PLASMA - Abnormal; Notable for the following components:    Lactic Acid 2.3 (*)     All other components within normal limits   CSF CELL COUNT WITH DIFFERENTIAL - Abnormal; Notable for the following components:    WBC, CSF 23 (*)     All other components within normal limits    Narrative:     CALL  [facility] tel. [phone],  CSF Nucleated Cell results called to and read back by [facility] RN,  01/20/2021 03:23, by Giancarlo Dalton   PROTEIN, CSF - Abnormal; Notable for the following components:    Protein, CSF 71 (*)     All other components within normal limits   GLUCOSE, CSF - Abnormal; Notable for the following components:    Glucose, CSF 96 (*)     All other components within normal limits   POCT GLUCOSE - Abnormal; Notable for the following components:    POC Glucose 162 (*)     All other components within normal limits   POCT GLUCOSE - Normal   RAPID INFLUENZA A/B ANTIGENS   CULTURE, BLOOD 1   CULTURE, BLOOD 2   CULTURE, CSF   GRAM STAIN   URINE RT REFLEX TO CULTURE   COVID-19   TROPONIN   CK   APTT   PROTIME-INR   PROCALCITONIN   TSH WITHOUT REFLEX   URINE DRUG SCREEN, COMPREHENSIVE   LACTIC ACID, PLASMA CRYPTOCOCCAL ANTIGEN   RPR       All other labs were within normal range or not returned as of this dictation. EMERGENCY DEPARTMENT COURSE and DIFFERENTIALDIAGNOSIS/MDM:   Vitals:    Vitals:    01/20/21 0030 01/20/21 0045 01/20/21 0115 01/20/21 0130   BP: 137/76 139/72 136/79 129/83   Pulse: 81 80 86 89   Resp: 20 20 17 14   Temp:       TempSrc:       SpO2: 97%      Weight:       Height:               MDM  Number of Diagnoses or Management Options     Amount and/or Complexity of Data Reviewed  Clinical lab tests: reviewed and ordered  Tests in the radiology section of CPT®: reviewed and ordered  Tests in the medicine section of CPT®: ordered and reviewed    Risk of Complications, Morbidity, and/or Mortality  Presenting problems: high  Diagnostic procedures: high  Management options: high    Critical Care  Total time providing critical care: > 105 minutes    Patient Progress  Patient progress: stable      CRITICAL CARE TIME   Total Critical Care time was  minutes, excluding separately reportable procedures. There was a high probability of clinically significant/life threatening deterioration in the patient's condition which required my urgentintervention. CONSULTS:  None    PROCEDURES:  Unless otherwise noted below, none     Lumbar Puncture    Date/Time: 1/20/2021 12:06 AM  Performed by: Harris Hummel MD  Authorized by: Harris Hummel MD     Consent:     Consent obtained:  Verbal    Consent given by:  Spouse    Risks discussed:  Bleeding, infection, pain, repeat procedure, nerve damage and headache    Alternatives discussed:  No treatment, delayed treatment, alternative treatment, observation and referral  Pre-procedure details:     Procedure purpose:  Diagnostic    Preparation: Patient was prepped and draped in usual sterile fashion    Anesthesia (see MAR for exact dosages):      Anesthesia method:  Local infiltration    Local anesthetic:  Lidocaine 2% w/o epi  Procedure details:     Lumbar space:

## 2021-01-20 NOTE — H&P
[facility]  MEDICINE    HISTORY AND PHYSICAL EXAM    PATIENT NAME:  Allie Carl    MRN:  [id number]  SERVICE DATE:  1/20/2021   SERVICE TIME:  2:48 AM    Primary Care Physician: Coley Fothergill, MD         SUBJECTIVE  CHIEF COMPLAINT:  AMS     HPI:  This is a 72 y.o. male w hx COPD, DM, HLD, and HTN who presents with one day history of altered mental status per family. Found to be less responsive, less talkative today. He had fever at home,    Patient states he feels OK, responds slowly to questions,    He denies pain, N/V, chest pain and breathing difficulty. In ED, cultures were sent,  LP performed - results pending and he received zosyn/   He is transferred here for further eval and treatment.              PAST MEDICAL HISTORY:    Past Medical History:   Diagnosis Date    COPD [facility]ch[facility] obstructive [facility] disease) ([facility]     Diabetes mellitus ([facility]     Hyperlipidemia     Hypertension     Lung disease      PAST SURGICAL HISTORY:    Past Surgical History:   Procedure Laterality Date    COLONOSCOPY      HAND SURGERY Right     [place] COLON CA SCRN NOT HI RSK IND N/A 8/15/2018    COLONOSCOPY performed by Philomena Sefl MD at [address] [facility] N/A 5/21/2019    SIGMOIDOSCOPY W/ DILATION performed by Philomena Self MD at [facility]:    Family History   Problem Relation Age of Onset    Coronary Art Dis Mother     Coronary Art Dis Sister     Colon Cancer Brother     Coronary Art Dis Brother      SOCIAL HISTORY:    Social History     Socioeconomic History    Marital status:      Spouse name: Not on file    Number of children: Not on file    Years of education: Not on file    Highest education level: Not on file   Occupational History    Not on file   Social Needs    Financial resource strain: Not on file    Food insecurity     Worry: Not on file     Inability: Not on file    Transportation needs     Medical: Not on file Non-medical: Not on file   Tobacco Use    Smoking status: Former Smoker     Years: 40.00     Types: Cigarettes     Start date: 1968     Quit date: 2000     Years since quittin.6    Smokeless tobacco: Never Used   Substance and Sexual Activity    Alcohol use: No     Alcohol/week: 0.0 standard drinks    Drug use: No    Sexual activity: Not on file   Lifestyle    Physical activity     Days per week: Not on file     Minutes per session: Not on file    Stress: Not on file   Relationships    Social connections     Talks on phone: Not on file     Gets together: Not on file     Attends Orthodoxy service: Not on file     Active member of club or organization: Not on file     Attends meetings of clubs or organizations: Not on file     Relationship status: Not on file    Intimate partner violence     Fear of current or ex partner: Not on file     Emotionally abused: Not on file     Physically abused: Not on file     Forced sexual activity: Not on file   Other Topics Concern    Not on file   Social History Narrative    Not on file     MEDICATIONS:   Prior to Admission medications    Medication Sig Start Date End Date Taking?  Authorizing Provider   metFORMIN (GLUCOPHAGE) 500 MG tablet  10/30/18   Historical Provider, MD   [address] [place] [address]  9/10/18   Historical Provider, MD   ACCU-CHEK SOFTCLIX LANCETS [address] [facility]  9/10/18   Historical Provider, MD   Respiratory Therapy Supplies SOURAV Change CPAP pressure to 6 cm   New CPAP mask and supplies 10/25/18   Ann Durham MD   gabapentin (NEURONTIN) 300 MG capsule  1/3/16   Historical Provider, MD   levalbuterol (XOPENEX HFA) 45 MCG/ACT inhaler Inhale 2 puffs into the lungs every 4 hours as needed for Wheezing    Historical Provider, MD   naproxen (NAPROSYN) 500 MG tablet Take 500 mg by mouth 2 times daily (with meals)    Historical Provider, MD   brimonidine (ALPHAGAN) 0.2 % ophthalmic solution  16   Historical Provider, MD   lovastatin (MEVACOR) 40 MG tablet  1/25/16   Historical Provider, MD   levothyroxine (SYNTHROID) 100 MCG tablet  1/25/16   Historical Provider, MD   clonazePAM Brendolyn Shadow) 2 MG tablet  12/26/15   Historical Provider, MD   allopurinol (ZYLOPRIM) 300 MG tablet  1/25/16   Historical Provider, MD   lisinopril (PRINIVIL;ZESTRIL) 2.5 MG tablet  1/25/16   Historical Provider, MD   traMADol (ULTRAM) 50 MG tablet Take 50 mg by mouth as needed for Pain    Historical Provider, MD   predniSONE (DELTASONE) 10 MG tablet Take 10 mg by mouth daily    Historical Provider, MD       ALLERGIES: Patient has no known allergies. REVIEW OF SYSTEM:   Review of Systems   Unable to perform ROS: Mental status change   Constitutional: Positive for fatigue. Negative for chills. Respiratory: Negative for shortness of breath. Cardiovascular: Negative for chest pain. Gastrointestinal: Negative for anal bleeding. Psychiatric/Behavioral: Positive for confusion. OBJECTIVE  PHYSICAL EXAM:   Physical Exam  Constitutional:       General: He is not in acute distress. Appearance: He is ill-appearing. Comments: Oriented to person,  place   HENT:      Head: Normocephalic. Nose: Nose normal.      Mouth/Throat:      Mouth: Mucous membranes are dry. Eyes:      Comments: Conjunctival erythema   Neck:      Musculoskeletal: No muscular tenderness. Vascular: No carotid bruit. Cardiovascular:      Rate and Rhythm: Normal rate and regular rhythm. Pulses: Normal pulses. Pulmonary:      Effort: No respiratory distress. Breath sounds: No wheezing. Abdominal:      Palpations: Abdomen is soft. Tenderness: There is no abdominal tenderness. Musculoskeletal:      Right lower leg: No edema. Left lower leg: No edema. Lymphadenopathy:      Cervical: No cervical adenopathy. Skin:     General: Skin is warm and dry. Capillary Refill: Capillary refill takes less than 2 seconds. Findings: No rash.    Neurological: CREATININE 1.64 (H) 0.70 - 1.20 mg/dL    GFR Non-African American 41.8 (L) >60    GFR  50.6 (L) >60    Calcium 10.2 (H) 8.5 - 9.9 mg/dL    Total Protein 8.0 6.3 - 8.0 g/dL    Alb 4.5 3.5 - 4.6 g/dL    Total Bilirubin 0.6 0.2 - 0.7 mg/dL    Alkaline Phosphatase 94 35 - 104 U/L    ALT 10 0 - 41 U/L    AST 17 0 - 40 U/L    Globulin 3.5 2.3 - 3.5 g/dL   Troponin    Collection Time: 01/19/21  5:28 PM   Result Value Ref Range    Troponin <0.010 0.000 - 0.010 ng/mL   CK    Collection Time: 01/19/21  5:28 PM   Result Value Ref Range    Total  0 - 190 U/L   APTT    Collection Time: 01/19/21  5:28 PM   Result Value Ref Range    aPTT 25.3 24.4 - 36.8 sec   Protime-INR    Collection Time: 01/19/21  5:28 PM   Result Value Ref Range    Protime 13.7 12.3 - 14.9 sec    INR 1.0    Lactic Acid, Plasma    Collection Time: 01/19/21  5:28 PM   Result Value Ref Range    Lactic Acid 2.3 (H) 0.5 - 2.2 mmol/L   PROCALCITONIN    Collection Time: 01/19/21  5:28 PM   Result Value Ref Range    Procalcitonin 0.13 0.00 - 0.15 ng/mL   TSH without Reflex    Collection Time: 01/19/21  5:28 PM   Result Value Ref Range    TSH 0.479 0.440 - 3.860 uIU/mL   COVID-19    Collection Time: 01/19/21  5:38 PM   Result Value Ref Range    SARS-CoV-2, NAAT Not Detected Not Detected   Rapid Influenza A/B Antigens    Collection Time: 01/19/21  5:38 PM    Specimen: Nasopharyngeal   Result Value Ref Range    Influenza A by PCR Negative     Influenza B by PCR Negative    Urine Reflex to Culture    Collection Time: 01/19/21  6:27 PM    Specimen: Urine, clean catch   Result Value Ref Range    Color, UA Yellow Straw/Yellow    Clarity, UA Clear Clear    Glucose, Ur Negative Negative mg/dL    Bilirubin Urine Negative Negative    Ketones, Urine Negative Negative mg/dL    Specific Gravity, UA 1.015 1.005 - 1.030    Blood, Urine Negative Negative    pH, UA 5.5 5.0 - 9.0    Protein, UA Negative Negative mg/dL    Urobilinogen, Urine 0.2 <2.0 E.U./dL Nitrite, Urine Negative Negative    Leukocyte Esterase, Urine Negative Negative    Urine Reflex to Culture Not Indicated    Urine Drug Screen, Comprehensive    Collection Time: 01/19/21  8:32 PM   Result Value Ref Range    PCP Screen, Urine Neg Negative <25 ng/mL    Benzodiazepine Screen, Urine Neg Negative <150 ng/mL    Cocaine Metabolite Screen, Urine Neg Negative <150 ng/mL    Amphetamine Screen, Urine Neg Negative <500 ng/mL    Cannabinoid Scrn, Ur Neg Negative <50 ng/mL    Opiate Scrn, Ur Neg Negative <100 ng/mL    Barbiturate Screen, Ur Neg Negative <448 ng/mL    Tricyclic Neg Negative <139 ng/mL    Drug Screen Comment: see below    Lactic Acid, Plasma    Collection Time: 01/19/21  9:32 PM   Result Value Ref Range    Lactic Acid 1.8 0.5 - 2.2 mmol/L   CSF Cell Count with Differential    Collection Time: 01/19/21 11:54 PM   Result Value Ref Range    Color, CSF Colorless     Appearance, CSF Clear     Tube Number + CELL CT + DIFF-CSF Tube 4     Clot Evaluation CSF see below     Volume Csf 4.5 mL   Cryptococcal AG Reflex to Titer    Collection Time: 01/19/21 11:54 PM   Result Value Ref Range    Crypto Ag, CSF Negative Negative       IMAGING:  No results found. VTE Prophylaxis: low molecular weight heparin -  start    ASSESSMENT AND PLAN  Principal Problem:    AMS   Less talkative. Fever at home. Labs w/ WBC 6.9,  Lactic acid  1.8 urine negative. BC x 2 pending. Had spinal tap   WBC 23 in CSF    Flu A, B , covid  All negative. Plan: admit. ID consult   Will cover for meningitis         Diabetes mellitus   Last A1c 6.5        Metformin at home per records - awaiting wife to verify meds in AM.    Plan: SSI for now. Hyperlipidemia   Stable on statin   Plan: continue meds. Hypothyroid  Synthroid 100 mcg   TSH 0.479     Plan: consider decrease dose. Hypertension  150/88 at OSH    No HA, chest pain or edema. Normal heart sounds   Palpable pulses.   Was on low dose lisinopril Plan: can continue meds   Await verification by wife in AM      CKD    B / Scr  24 / 1.64   GFR 50.6     Baseline Scr 1.48 - 1. 58      Plan: monitor.          Plan of care discussed with: patient    SIGNATURE: TAMIR Way CNP  DATE: January 20, 2021  TIME: 2:48 AM   Neha Bravo DO  - supervising physician

## 2021-01-20 NOTE — PROGRESS NOTES
0215 Received from [facility] via [facility] into room 490. Bed low and locked with alarm engaged. Call light with in reach. Patient remains lying flat s/p lumbar puncture.

## 2021-01-20 NOTE — ED NOTES
Called transfer center, talked to [employer], she will page the ED [facility] Hospitalist for Dr. Yoly Zuluaga.   Lesia Counts include 234 beds at the [facility]  01/19/21 6011

## 2021-01-20 NOTE — CARE COORDINATION
[facility] AT [place] Case Management Initial Discharge Assessment    Met with [facility] to discuss discharge plan. PCP: Derrick Holloway MD                                Date of Last Visit:1/14/21  If no PCP, list provided? N/A    Discharge Planning    Living Arrangements: independently at home    Who do you live with? [facility]. Who helps you with your care:  self    If lives at home:     Do you have any barriers navigating in your home? no    Patient can perform ADL? Yes PT WAS INDEPENDENT AND DROVE PRIOR TO HOSPITALIZATION    Current Services (outpatient and in home) :  None    Dialysis: No    Is transportation available to get to your appointments? Yes    DME Equipment:  no    Respiratory equipment: CPAP without O2    Respiratory provider:  yes - MEDICAL SERVICES     Pharmacy:  yes - [address] [place] [address] with Medication Assistance Program?  No      Patient agreeable to [address]? Yes, [address] LIST    Patient agreeable to SNF/Rehab? N/A    Other discharge needs identified? N/A    Freedom of choice list provided with basic dialogue that supports the patient's individualized plan of care/goals and shares the quality data associated with the providers. Yes    Does Patient Have a High-Risk for Readmission Diagnosis (CHF, PN, MI, COPD)? No         The plan for Transition of Care is related to the following treatment goals:TO   RETURN TO BASELINE COGNITIVELY    Initial Discharge Plan?  RETURN HOME WITH [facility] PENDING IMPROVEMENT OF COGNITION  [address] IF NEEDED WIFE HAS [facility] [address] LIST    The Patient and/or patient representative:Earline Pack was provided with choice of any post-acute providers for care and equipment and agrees with discharge plan  Yes    Electronically signed by Johnson Bedolla RN on 1/20/2021 at 4:52 PM

## 2021-01-20 NOTE — ED NOTES
Wife updated on patient. Particia Anel.  Tomas Parent, [address] [place] [address]  01/19/21 3696

## 2021-01-20 NOTE — PROGRESS NOTES
Hospitalist Progress Note      PCP: Judy Abebe MD    Date of Admission: 1/20/2021    Chief Complaint:  No acute events, afebrile, stable HD    Medications:  Reviewed    Infusion Medications    sodium chloride 75 mL/hr at 01/20/21 0353    dextrose       Scheduled Medications    sodium chloride flush  10 mL Intravenous 2 times per day    enoxaparin  40 mg Subcutaneous Daily    insulin lispro  0-12 Units Subcutaneous 4 times per day    cefTRIAXone (ROCEPHIN) IV  2,000 mg Intravenous Q12H    ampicillin IV  2,000 mg Intravenous 6 times per day    acyclovir  5 mg/kg Intravenous Q8H    vancomycin (VANCOCIN) intermittent dosing (placeholder)   Other RX Placeholder    vancomycin  1,500 mg Intravenous Q18H     PRN Meds: sodium chloride flush, promethazine **OR** ondansetron, polyethylene glycol, acetaminophen **OR** acetaminophen, glucose, dextrose, glucagon (rDNA), dextrose      Intake/Output Summary (Last 24 hours) at 1/20/2021 1200  Last data filed at 1/20/2021 0235  Gross per 24 hour   Intake --   Output 250 ml   Net -250 ml       Exam:    /62   Pulse 88   Temp 98.4 °F (36.9 °C) (Oral)   Resp 16   Ht 6' 6\" (1.981 m)   Wt 220 lb 1.6 oz (99.8 kg)   SpO2 92%   BMI 25.44 kg/m²     General appearance: appears stated age and cooperative. Respiratory:  clear to auscultation, bilaterally   Cardiovascular: Regular rate and rhythm, S1/S2. Abdomen: Soft, active bowel sounds. Musculoskeletal: No edema bilaterally.       Labs:   Recent Labs     01/19/21  1728 01/20/21  0730   WBC 6.9 5.6   HGB 15.0 14.4   HCT 46.6 42.5    161     Recent Labs     01/19/21  1728 01/20/21  0730    139   K 4.2 4.1    105   CO2 23 20   BUN 24* 20   CREATININE 1.64* 1.41*   CALCIUM 10.2* 9.7     Recent Labs     01/19/21  1728 01/20/21  0730   AST 17 17   ALT 10 10   BILITOT 0.6 0.5   ALKPHOS 94 87     Recent Labs     01/19/21  1728   INR 1.0     Recent Labs     01/19/21  1728   CKTOTAL 102   TROPONINI <0.010 Urinalysis:      Lab Results   Component Value Date    NITRU Negative 01/19/2021    BLOODU Negative 01/19/2021    SPECGRAV 1.015 01/19/2021    GLUCOSEU Negative 01/19/2021       Radiology:  No orders to display           Assessment/Plan:    72 y.o. male with history of COPD, DM, HTN who presented with:    Acute encephalopathy  - likely due to CNS infection  - LP showed pleyocytosis with lymphocytic predominance  - on IV Acyclovir per ID  - follow cultures     FER  - improved    DM2 with hyperglycemia  - ISS, hypoglycemia protocol            Electronically signed by Eligio Henriquez MD on 1/20/2021 at 12:00 PM

## 2021-01-20 NOTE — PROGRESS NOTES
Pharmacy Note  Vancomycin Consult    Yani Connor is a 72 y.o. male started on Vancomycin for meningitis; consult received from Dr. Janessa Muller to manage therapy. Also receiving the following antibiotics: ampicillin, Rocephin , Zovirax  (antiviral)    Patient Active Problem List   Diagnosis    YANG (obstructive sleep apnea)    Borderline diabetes    Asthma-chronic obstructive pulmonary disease overlap syndrome (HCC)    Hyperopia of both eyes with astigmatism and presbyopia    Personal history of tobacco use, presenting hazards to health    Primary open angle glaucoma (POAG) of left eye, moderate stage    Pseudophakia of both eyes    S/P laser iridotomy    Secondary glaucoma of right eye    Secondary optic atrophy of right eye    Traumatic mydriasis    Diabetes mellitus ([address]    Wheezing    Proctitis    AMS (altered mental status)    Hyperlipidemia    Hypothyroid    Hypertension    CKD (chronic kidney disease)       Allergies:  Patient has no known allergies. Recent Labs     01/19/21  1728 01/20/21  0730   BUN 24* 20       Recent Labs     01/19/21  1728 01/20/21  0730   CREATININE 1.64* 1.41*       Recent Labs     01/19/21  1728 01/20/21  0730   WBC 6.9 5.6         Intake/Output Summary (Last 24 hours) at 1/20/2021 1010  Last data filed at 1/20/2021 0235  Gross per 24 hour   Intake --   Output 250 ml   Net -250 ml           Ht Readings from Last 1 Encounters:   01/20/21 6' 6\" (1.981 m)        Wt Readings from Last 1 Encounters:   01/20/21 220 lb 1.6 oz (99.8 kg)         Body mass index is 25.44 kg/m². Estimated Creatinine Clearance: 64 mL/min (A) (based on SCr of 1.41 mg/dL (H)). Goal Trough Level: 15-20 mcg/mL    Assessment/Plan:  Will initiate Vancomycin with 1500 mg IV every 18 hours. Timing of trough level will be determined based on culture results, renal function, and clinical response. Trough prior to 4th dose. Will follow renal function closely (CKD). Thank you for the consult.   Will continue to follow.     Doug Garcia [facility]  01/20/21  10:18 AM

## 2021-01-20 NOTE — CONSULTS
Infectious Disease     Patient Name: Danielle Honeycutt  Date: 1/20/2021  YOB: 1950  Medical Record Number: [id number]        Aseptic meningitis      History of Present Illness:  History of COPD diabetes hyperlipidemia hypertension        Presents with altered mental status less responsive less talkative fevers    Lumbar puncture performed showing leukocytosis 23 white cells          1/20/2021  3:40 AM - Buck, Chpo Incoming Lab Results From Soft    Component Value Ref Range & Units Status Collected Lab   Color, CSF Colorless   Final 01/19/2021 11:54 PM [address] [facility]   Appearance, CSF Clear   Final 01/19/2021 11:54 PM [address] [facility]   Tube Number + CELL CT + DIFF-CSF Tube 4   Final 01/19/2021 11:54 PM [address] [facility]   Clot Evaluation CSF see below   Final 01/19/2021 11:54 PM [address] [facility]   No Clots Seen   WBC, CSF 23High Panic   0 - 8 K/uL Final 01/19/2021 11:54 PM [address] [facility]   RBC,   K/uL Final 01/19/2021 11:54 PM [address] [facility]   Neutrophils, CSF 1  % Final 01/19/2021 11:54 PM  - [facility]   Lymphs, CSF 92  % Final 01/19/2021 11:54 PM  - [facility]   Monocytes, CSF 4  % Final 01/19/2021 11:54 PM [address] [facility]   Eosinophils, CSF 3  % Final 01/19/2021 11:54 PM [address] [facility]   Path Consult CSF Yes   Final 01/19/2021 11:54 PM [address] [facility]       Glucose, CSF 96High   50 - 70 mg/dL Final     Protein, CSF 71High   15 - 45 mg/dL Final       SARS-CoV-2, NAAT Not Detected  Not Detected Final 01/19/2021          Review of Systems   Constitutional: Positive for fatigue and fever. Negative for chills and diaphoresis. HENT: Negative. Eyes: Negative. Respiratory: Negative. Cardiovascular: Negative. Gastrointestinal: Negative. Endocrine: Negative. Genitourinary: Negative. Musculoskeletal: Negative. Neurological: Positive for dizziness, weakness and headaches. Review of Systems: All 14 review of systems negative other than as stated above    Social History     Tobacco Use    Smoking status: Former Smoker     Years: 40.00     Types: Cigarettes     Start date: 1968     Quit date: 2000     Years since quittin.6    Smokeless tobacco: Never Used   Substance Use Topics    Alcohol use: No     Alcohol/week: 0.0 standard drinks    Drug use: No         Past Medical History:   Diagnosis Date    COPD (chronic obstructive pulmonary disease) [facility]     Diabetes mellitus ([facility]     Hyperlipidemia     Hypertension     Lung disease            Past Surgical History:   Procedure Laterality Date    COLONOSCOPY      HAND SURGERY Right     MI COLON CA SCRN NOT HI RSK IND N/A 8/15/2018    [facility]inos [facility] 2019    SIGMOIDOSCOPY W/ DILATION performed by Marily Chirinos MD at [address] [place] [address]           No current facility-administered medications on file prior to encounter. Current Outpatient Medications on File Prior to Encounter   Medication Sig Dispense Refill    ACCU-CHEK SALVADOR PLUS strip       ACCU-CHEK SOFTCLIX LANCETS [facility]       Respiratory Therapy Supplies SOURAV Change CPAP pressure to 6 cm   New CPAP mask and supplies 1 Device 0    gabapentin (NEURONTIN) 300 MG capsule Take 300 mg by mouth nightly.  levalbuterol (XOPENEX HFA) 45 MCG/ACT inhaler Inhale 2 puffs into the lungs every 4 hours as needed for Wheezing      brimonidine (ALPHAGAN) 0.2 % ophthalmic solution Place 1 drop into both eyes 2 times daily       lovastatin (MEVACOR) 40 MG tablet Take 40 mg by mouth daily       levothyroxine (SYNTHROID) 100 MCG tablet Take 100 mcg by mouth Daily       clonazePAM (KLONOPIN) 2 MG tablet Take 2 mg by mouth nightly.        allopurinol (ZYLOPRIM) 300 MG tablet       naproxen (NAPROSYN) 500 MG tablet Take 500 mg by mouth 2 times daily as needed       traMADol (ULTRAM) 50 MG tablet Take 50 mg by mouth every 6 hours as needed for Pain.  predniSONE (DELTASONE) 10 MG tablet Take 10 mg by mouth daily as needed          No Known Allergies      Family History   Problem Relation Age of Onset    Coronary Art Dis Mother     Coronary Art Dis Sister     Colon Cancer Brother     Coronary Art Dis Brother          Physical Exam:      Physical Exam   Constitutional: No distress. Eyes: Pupils are equal, round, and reactive to light. Neck: Normal range of motion. No JVD present. No tracheal deviation present. No thyromegaly present. Cardiovascular: Normal heart sounds. No murmur heard. Pulmonary/Chest: No respiratory distress. He has no wheezes. He has no rales. He exhibits no tenderness. Abdominal: Soft. He exhibits no distension and no mass. There is no abdominal tenderness. There is no rebound and no guarding. Musculoskeletal: Normal range of motion. General: No edema. Lymphadenopathy:     He has no cervical adenopathy. Neurological:   Some confusion   Skin: Skin is warm. No rash noted. He is not diaphoretic. No erythema. No pallor. Blood pressure 130/62, pulse 88, temperature 98.4 °F (36.9 °C), temperature source Oral, resp. rate 16, height 6' 6\" (1.981 m), weight 220 lb 1.6 oz (99.8 kg), SpO2 92 %.       .   Lab Results   Component Value Date    WBC 5.6 01/20/2021    HGB 14.4 01/20/2021    HCT 42.5 01/20/2021    MCV 86.7 01/20/2021     01/20/2021     Lab Results   Component Value Date     01/20/2021    K 4.1 01/20/2021     01/20/2021    CO2 20 01/20/2021    BUN 20 01/20/2021    CREATININE 1.41 01/20/2021    GLUCOSE 172 01/20/2021    GLUCOSE 79 05/09/2012    CALCIUM 9.7 01/20/2021                ASSESSMENT:  Patient Active Problem List   Diagnosis    YANG (obstructive sleep apnea)    Borderline diabetes    Asthma-chronic obstructive pulmonary disease overlap syndrome (HCC)    Hyperopia of both eyes with astigmatism and

## 2021-01-20 NOTE — ED NOTES
Dr Nevaeh Bacon on line with Dr Allyn Farr for possible admission. Saeid Credit.  [facility]  01/19/21 0559

## 2021-01-20 NOTE — ED NOTES
Dr. Gibbs Shallow paged for Dr. Darin Carcamo.   [facility]  [facility]  01/19/21 4691

## 2021-01-20 NOTE — ED NOTES
[facility] here for transport. Aware patient needs to remain flat. Robbie Tam.  Tomas Parent, [place]  01/20/21 8230

## 2021-01-21 ENCOUNTER — APPOINTMENT (OUTPATIENT)
Dept: MRI IMAGING | Age: 71
DRG: 098 | End: 2021-01-21
Attending: INTERNAL MEDICINE
Payer: MEDICARE

## 2021-01-21 LAB
ALBUMIN SERPL-MCNC: 3.6 G/DL (ref 3.5–4.6)
ALP BLD-CCNC: 84 U/L (ref 35–104)
ALT SERPL-CCNC: 8 U/L (ref 0–41)
ANION GAP SERPL CALCULATED.3IONS-SCNC: 15 MEQ/L (ref 9–15)
AST SERPL-CCNC: 19 U/L (ref 0–40)
BASOPHILS ABSOLUTE: 0 K/UL (ref 0–0.2)
BASOPHILS RELATIVE PERCENT: 0.8 %
BILIRUB SERPL-MCNC: 0.6 MG/DL (ref 0.2–0.7)
BUN BLDV-MCNC: 17 MG/DL (ref 8–23)
CALCIUM SERPL-MCNC: 9.9 MG/DL (ref 8.5–9.9)
CHLORIDE BLD-SCNC: 103 MEQ/L (ref 95–107)
CO2: 16 MEQ/L (ref 20–31)
CREAT SERPL-MCNC: 1.28 MG/DL (ref 0.7–1.2)
EOSINOPHILS ABSOLUTE: 0.2 K/UL (ref 0–0.7)
EOSINOPHILS RELATIVE PERCENT: 2.8 %
GFR AFRICAN AMERICAN: >60
GFR NON-AFRICAN AMERICAN: 55.7
GLOBULIN: 4 G/DL (ref 2.3–3.5)
GLUCOSE BLD-MCNC: 133 MG/DL (ref 60–115)
GLUCOSE BLD-MCNC: 136 MG/DL (ref 60–115)
GLUCOSE BLD-MCNC: 140 MG/DL (ref 60–115)
GLUCOSE BLD-MCNC: 162 MG/DL (ref 60–115)
GLUCOSE BLD-MCNC: 166 MG/DL (ref 70–99)
HCT VFR BLD CALC: 44.9 % (ref 42–52)
HEMOGLOBIN: 14.5 G/DL (ref 14–18)
LYMPHOCYTES ABSOLUTE: 1.5 K/UL (ref 1–4.8)
LYMPHOCYTES RELATIVE PERCENT: 27.5 %
MCH RBC QN AUTO: 29 PG (ref 27–31.3)
MCHC RBC AUTO-ENTMCNC: 32.3 % (ref 33–37)
MCV RBC AUTO: 89.8 FL (ref 80–100)
MONOCYTES ABSOLUTE: 0.6 K/UL (ref 0.2–0.8)
MONOCYTES RELATIVE PERCENT: 11.3 %
NEUTROPHILS ABSOLUTE: 3.2 K/UL (ref 1.4–6.5)
NEUTROPHILS RELATIVE PERCENT: 57.6 %
PATH CONSULT FLUID: NORMAL
PDW BLD-RTO: 16.1 % (ref 11.5–14.5)
PERFORMED ON: ABNORMAL
PLATELET # BLD: 163 K/UL (ref 130–400)
POTASSIUM REFLEX MAGNESIUM: 5.5 MEQ/L (ref 3.4–4.9)
RBC # BLD: 5 M/UL (ref 4.7–6.1)
RPR: NORMAL
SODIUM BLD-SCNC: 134 MEQ/L (ref 135–144)
TOTAL PROTEIN: 7.6 G/DL (ref 6.3–8)
WBC # BLD: 5.6 K/UL (ref 4.8–10.8)

## 2021-01-21 PROCEDURE — 70553 MRI BRAIN STEM W/O & W/DYE: CPT

## 2021-01-21 PROCEDURE — 6360000004 HC RX CONTRAST MEDICATION: Performed by: PSYCHIATRY & NEUROLOGY

## 2021-01-21 PROCEDURE — 85025 COMPLETE CBC W/AUTO DIFF WBC: CPT

## 2021-01-21 PROCEDURE — 6360000002 HC RX W HCPCS: Performed by: INTERNAL MEDICINE

## 2021-01-21 PROCEDURE — 1210000000 HC MED SURG R&B

## 2021-01-21 PROCEDURE — 2580000003 HC RX 258: Performed by: NURSE PRACTITIONER

## 2021-01-21 PROCEDURE — 99222 1ST HOSP IP/OBS MODERATE 55: CPT | Performed by: PSYCHIATRY & NEUROLOGY

## 2021-01-21 PROCEDURE — A9579 GAD-BASE MR CONTRAST NOS,1ML: HCPCS | Performed by: PSYCHIATRY & NEUROLOGY

## 2021-01-21 PROCEDURE — 99232 SBSQ HOSP IP/OBS MODERATE 35: CPT | Performed by: INTERNAL MEDICINE

## 2021-01-21 PROCEDURE — 6370000000 HC RX 637 (ALT 250 FOR IP): Performed by: INTERNAL MEDICINE

## 2021-01-21 PROCEDURE — 94664 DEMO&/EVAL PT USE INHALER: CPT

## 2021-01-21 PROCEDURE — 36415 COLL VENOUS BLD VENIPUNCTURE: CPT

## 2021-01-21 PROCEDURE — 70544 MR ANGIOGRAPHY HEAD W/O DYE: CPT

## 2021-01-21 PROCEDURE — 6360000002 HC RX W HCPCS: Performed by: NURSE PRACTITIONER

## 2021-01-21 PROCEDURE — 80053 COMPREHEN METABOLIC PANEL: CPT

## 2021-01-21 PROCEDURE — 2580000003 HC RX 258: Performed by: INTERNAL MEDICINE

## 2021-01-21 RX ORDER — LEVOTHYROXINE SODIUM 0.1 MG/1
100 TABLET ORAL DAILY
Status: DISCONTINUED | OUTPATIENT
Start: 2021-01-21 | End: 2021-01-23 | Stop reason: HOSPADM

## 2021-01-21 RX ORDER — GABAPENTIN 300 MG/1
300 CAPSULE ORAL NIGHTLY
Status: DISCONTINUED | OUTPATIENT
Start: 2021-01-21 | End: 2021-01-23 | Stop reason: HOSPADM

## 2021-01-21 RX ORDER — ALLOPURINOL 300 MG/1
300 TABLET ORAL DAILY
Status: DISCONTINUED | OUTPATIENT
Start: 2021-01-21 | End: 2021-01-23 | Stop reason: HOSPADM

## 2021-01-21 RX ORDER — ATORVASTATIN CALCIUM 10 MG/1
10 TABLET, FILM COATED ORAL NIGHTLY
Status: DISCONTINUED | OUTPATIENT
Start: 2021-01-21 | End: 2021-01-23 | Stop reason: HOSPADM

## 2021-01-21 RX ORDER — CLONAZEPAM 1 MG/1
2 TABLET ORAL NIGHTLY
Status: CANCELLED | OUTPATIENT
Start: 2021-01-21

## 2021-01-21 RX ORDER — TRAMADOL HYDROCHLORIDE 50 MG/1
50 TABLET ORAL EVERY 6 HOURS PRN
Status: CANCELLED | OUTPATIENT
Start: 2021-01-21

## 2021-01-21 RX ORDER — SODIUM POLYSTYRENE SULFONATE 15 G/60ML
15 SUSPENSION ORAL; RECTAL ONCE
Status: COMPLETED | OUTPATIENT
Start: 2021-01-21 | End: 2021-01-21

## 2021-01-21 RX ORDER — LEVALBUTEROL TARTRATE 45 UG/1
2 AEROSOL, METERED ORAL EVERY 4 HOURS PRN
Status: DISCONTINUED | OUTPATIENT
Start: 2021-01-21 | End: 2021-01-21 | Stop reason: CLARIF

## 2021-01-21 RX ORDER — ALBUTEROL SULFATE 2.5 MG/3ML
2.5 SOLUTION RESPIRATORY (INHALATION) EVERY 4 HOURS PRN
Status: DISCONTINUED | OUTPATIENT
Start: 2021-01-21 | End: 2021-01-23 | Stop reason: HOSPADM

## 2021-01-21 RX ORDER — LORAZEPAM 2 MG/ML
1 INJECTION INTRAMUSCULAR
Status: ACTIVE | OUTPATIENT
Start: 2021-01-21 | End: 2021-01-21

## 2021-01-21 RX ORDER — BRIMONIDINE TARTRATE 2 MG/ML
1 SOLUTION/ DROPS OPHTHALMIC 2 TIMES DAILY
Status: DISCONTINUED | OUTPATIENT
Start: 2021-01-21 | End: 2021-01-23 | Stop reason: HOSPADM

## 2021-01-21 RX ADMIN — GADOTERIDOL 20 ML: 279.3 INJECTION, SOLUTION INTRAVENOUS at 15:22

## 2021-01-21 RX ADMIN — SODIUM CHLORIDE: 9 INJECTION, SOLUTION INTRAVENOUS at 20:26

## 2021-01-21 RX ADMIN — LEVOTHYROXINE SODIUM 100 MCG: 100 TABLET ORAL at 13:25

## 2021-01-21 RX ADMIN — ACYCLOVIR SODIUM 500 MG: 50 INJECTION, SOLUTION INTRAVENOUS at 13:14

## 2021-01-21 RX ADMIN — ACYCLOVIR SODIUM 500 MG: 50 INJECTION, SOLUTION INTRAVENOUS at 20:24

## 2021-01-21 RX ADMIN — ACYCLOVIR SODIUM 500 MG: 50 INJECTION, SOLUTION INTRAVENOUS at 05:07

## 2021-01-21 RX ADMIN — ALLOPURINOL 300 MG: 300 TABLET ORAL at 13:25

## 2021-01-21 RX ADMIN — GABAPENTIN 300 MG: 300 CAPSULE ORAL at 20:23

## 2021-01-21 RX ADMIN — SODIUM POLYSTYRENE SULFONATE 15 G: 15 SUSPENSION ORAL; RECTAL at 13:25

## 2021-01-21 RX ADMIN — ENOXAPARIN SODIUM 40 MG: 40 INJECTION SUBCUTANEOUS at 08:48

## 2021-01-21 RX ADMIN — SODIUM CHLORIDE: 9 INJECTION, SOLUTION INTRAVENOUS at 01:29

## 2021-01-21 RX ADMIN — ATORVASTATIN CALCIUM 10 MG: 10 TABLET, FILM COATED ORAL at 20:23

## 2021-01-21 ASSESSMENT — ENCOUNTER SYMPTOMS
RESPIRATORY NEGATIVE: 1
BACK PAIN: 0
GASTROINTESTINAL NEGATIVE: 1
COLOR CHANGE: 0
VOMITING: 0
CHOKING: 0
TROUBLE SWALLOWING: 0
PHOTOPHOBIA: 0
SHORTNESS OF BREATH: 0
NAUSEA: 0

## 2021-01-21 ASSESSMENT — PAIN SCALES - GENERAL: PAINLEVEL_OUTOF10: 0

## 2021-01-21 NOTE — CONSULTS
Subjective:      Patient ID: Thanh Foster is a 72 y.o. male who presents today for confusion and encephalopathy    HPI 71-year right-handed gentleman who was brought into the emergency room for acute mental status changes. Patient has continued to worsen. She also had a fever and weakness and confusion therefore had a lumbar puncture done which shows 23 white cells with lymphocytic pleocytosis with evaded proteins and glucose. Patient appeared to be intermittently confused when I saw him he knew it was 2020 one of the nurse reports confusion. He denies any headache  Patient has poor recall of events from yesterday but he reports that he walked in the room which is not clear if he truly did patient denies any ear infections or ear discharge. He is not any dysuria    Review of Systems   Constitutional: Negative for fever. HENT: Negative for ear pain, tinnitus and trouble swallowing. Eyes: Negative for photophobia and visual disturbance. Respiratory: Negative for choking and shortness of breath. Cardiovascular: Negative for chest pain and palpitations. Gastrointestinal: Negative for nausea and vomiting. Musculoskeletal: Negative for back pain, gait problem, joint swelling, myalgias, neck pain and neck stiffness. Skin: Negative for color change. Allergic/Immunologic: Negative for food allergies. Neurological: Negative for dizziness, tremors, seizures, syncope, facial asymmetry, speech difficulty, weakness, light-headedness, numbness and headaches. Psychiatric/Behavioral: Positive for confusion. Negative for behavioral problems, hallucinations and sleep disturbance.        Past Medical History:   Diagnosis Date    COPD [facility]chronic obstructive pulmonary disease) [facility] [address] [facility]     Diabetes mellitus ([facility]     Hyperlipidemia     Hypertension     Lung disease      Past Surgical History:   Procedure Laterality Date    COLONOSCOPY      HAND SURGERY Right     DE COLON CA SCRN NOT HI RSK IND N/A 8/15/2018    [facility] [address]    SIGMOIDOSCOPY W/ DILATION performed by Dave Conner MD at [address] History     Socioeconomic History    Marital status:      Spouse name: Not on file    Number of children: Not on file    Years of education: Not on file    Highest education level: Not on file   Occupational History    Not on file   Social Needs    Financial resource strain: Not on file    Food insecurity     Worry: Not on file     Inability: Not on file    Transportation needs     Medical: Not on file     Non-medical: Not on file   Tobacco Use    Smoking status: Former Smoker     Years: 40.00     Types: Cigarettes     Start date: 1968     Quit date: 2000     Years since quittin.6    Smokeless tobacco: Never Used   Substance and Sexual Activity    Alcohol use: No     Alcohol/week: 0.0 standard drinks    Drug use: No    Sexual activity: Not on file   Lifestyle    Physical activity     Days per week: Not on file     Minutes per session: Not on file    Stress: Not on file   Relationships    Social connections     Talks on phone: Not on file     Gets together: Not on file     Attends Mandaen service: Not on file     Active member of club or organization: Not on file     Attends meetings of clubs or organizations: Not on file     Relationship status: Not on file    Intimate partner violence     Fear of current or ex partner: Not on file     Emotionally abused: Not on file     Physically abused: Not on file     Forced sexual activity: Not on file   Other Topics Concern    Not on file   Social History Narrative    Not on file     Family History   Problem Relation Age of Onset    Coronary Art Dis Mother     Coronary Art Dis Sister     Colon Cancer Brother     Coronary Art Dis Brother      No Known Allergies  Current Facility-Administered Medications   Medication Dose Route Frequency Provider Last Rate Last Admin    sodium chloride flush 0.9 % injection 10 mL  10 mL Intravenous 2 times per day Reola Héctor, APRN - CNP   10 mL at 01/20/21 2044    sodium chloride flush 0.9 % injection 10 mL  10 mL Intravenous PRN Reola Héctor, APRN - CNP        enoxaparin (LOVENOX) injection 40 mg  40 mg Subcutaneous Daily Reola Héctor, APRN - CNP   40 mg at 01/21/21 0848    promethazine (PHENERGAN) tablet 12.5 mg  12.5 mg Oral Q6H PRN Reola Héctor, APRN - CNP        Or    ondansetron [facility]) injection 4 mg  4 mg Intravenous Q6H PRN Reola Héctor, APRN - CNP        polyethylene glycol (GLYCOLAX) packet 17 g  17 g Oral Daily PRN Reola Héctor, APRN - CNP        acetaminophen (TYLENOL) tablet 650 mg  650 mg Oral Q6H PRN Reola Héctor, APRN - CNP        Or    acetaminophen (TYLENOL) suppository 650 mg  650 mg Rectal Q6H PRN Reola Héctor, APRN - CNP        0.9 % sodium chloride infusion   Intravenous Continuous Reola Héctor, APRN - CNP 75 mL/hr at 01/21/21 0129 New Bag at 01/21/21 0129    glucose (GLUTOSE) 40 % oral gel 15 g  15 g Oral PRN Reola Héctor, APRN - CNP        dextrose 50 % IV solution  12.5 g Intravenous PRN Reola Héctor, APRN - CNP        glucagon (rDNA) injection 1 mg  1 mg Intramuscular PRN Reola Héctor, APRN - CNP        dextrose 5 % solution  100 mL/hr Intravenous PRN Reola Héctor, APRN - CNP        acyclovir (ZOVIRAX) 500 mg in dextrose 5 % 100 mL IVPB  5 mg/kg Intravenous Q8H Sen KIM Sedar, DO   Stopped at 01/21/21 0605    insulin lispro (HUMALOG) injection vial 0-12 Units  0-12 Units Subcutaneous 4x Daily AC & HS Suman [place] Sedar, DO   2 Units at 01/21/21 0848    labetalol (NORMODYNE;TRANDATE) injection 10 mg  10 mg Intravenous Q4H PRN Suman [place] Sedar, DO   10 mg at 01/20/21 1793        Objective:   BP (!) 150/83   Pulse 77 Temp 98.2 °F (36.8 °C) (Oral)   Resp 18   Ht 6' 6\" (1.981 m)   Wt 220 lb 1.6 oz (99.8 kg)   SpO2 98%   BMI 25.44 kg/m²     Physical Exam  Vitals signs reviewed. Eyes:      Pupils: Pupils are equal, round, and reactive to light. Neck:      Musculoskeletal: Normal range of motion. Cardiovascular:      Rate and Rhythm: Normal rate and regular rhythm. Heart sounds: No murmur. Pulmonary:      Effort: Pulmonary effort is normal.      Breath sounds: Normal breath sounds. Abdominal:      General: Bowel sounds are normal.   Musculoskeletal: Normal range of motion. Skin:     General: Skin is warm. Neurological:      Mental Status: He is alert. He is disoriented. Cranial Nerves: No cranial nerve deficit. Sensory: No sensory deficit. Motor: No abnormal muscle tone. Coordination: Coordination normal.      Deep Tendon Reflexes: Reflexes are normal and symmetric. Babinski sign absent on the right side. Babinski sign absent on the left side. Psychiatric:         Mood and Affect: Mood normal.       Is disorientation appears to be intermittent as when I saw him he knew it was 2021 tells me that it is going into February. No results found.     Lab Results   Component Value Date    WBC 5.6 01/21/2021    RBC 5.00 01/21/2021    HGB 14.5 01/21/2021    HCT 44.9 01/21/2021    MCV 89.8 01/21/2021    MCH 29.0 01/21/2021    MCHC 32.3 01/21/2021    RDW 16.1 01/21/2021     01/21/2021     Lab Results   Component Value Date     01/21/2021    K 5.5 01/21/2021     01/21/2021    CO2 16 01/21/2021    BUN 17 01/21/2021    CREATININE 1.28 01/21/2021    GFRAA >60.0 01/21/2021    LABGLOM 55.7 01/21/2021    GLUCOSE 166 01/21/2021    GLUCOSE 79 05/09/2012    PROT 7.6 01/21/2021    LABALBU 3.6 01/21/2021    CALCIUM 9.9 01/21/2021    BILITOT 0.6 01/21/2021    ALKPHOS 84 01/21/2021    AST 19 01/21/2021    ALT 8 01/21/2021     Lab Results   Component Value Date    PROTIME 13.7 01/19/2021    INR 1.0 01/19/2021     Lab Results   Component Value Date    TSH 0.479 01/19/2021    CFZXRYFT20 466 10/27/2020    FOLATE 16.8 10/27/2020     Lab Results   Component Value Date    TRIG 137 02/26/2018    HDL 36 02/26/2018    LDLCALC 50 02/26/2018     Lab Results   Component Value Date    LABAMPH Neg 01/19/2021    BARBSCNU Neg 01/19/2021    LABBENZ Neg 01/19/2021    OPIATESCREENURINE Neg 01/19/2021    PHENCYCLIDINESCREENURINE Neg 01/19/2021     No results found for: LITHIUM, DILFRTOT, VALPROATE    Assessment:   Viral encephalitis with a WBC count pleocytosis with lymphocytosis. The other etiology of this could be parameningeal focus as seen in other conditions and therefore will require follow-up. I recommend MRI of the brain with an MRV. Herpes titers are pending. Patient is on acyclovir. Patient has poor recall of events. There is no session of any headaches or ear discharge or any other findings to contribute to such a viral encephalitis and may be garden-variety viral encephalitis. We will follow him with you. Event that he has abnormal findings on the MRI we will consider anticonvulsants. Ollie Pagan MD, Vikas Acevedo, American Board of Psychiatry & Neurology  Board Certified in Vascular Neurology  Board Certified in Neuromuscular Medicine  Certified in [facility]:

## 2021-01-21 NOTE — PROGRESS NOTES
Physician Progress Note      PATIENT:               Saleem Knight  CSN #:                  [id number]  :                       1950  ADMIT DATE:       2021 2:37 AM  DISCH DATE:  RESPONDING  PROVIDER #:        Pamela Hung MD          QUERY TEXT:    Patient admitted with AMS with meningitis, noted to have Acute encephalopathy   likely due to CNS infection. If possible, please document in progress notes   and discharge summary if you are evaluating and/or treating any of the   following: The medical record reflects the following:  Risk Factors: meningitis  Clinical Indicators: one day history of AMS, less responsive, less talkative,   fever at home, confusion, oriented x 1-3, GCS 14-15; unable to follow commands   on admission improved to following commands, delayed responses, Dr. Mi Jackman   PN: \"Acute encephalopathy - likely due to CNS infection - LP showed   pleyocytosis with lymphocytic predominance - on IV Acyclovir per ID - follow   cultures\"  Treatment: ID consult, LP, neuro checks, labs and monitoring    If you have any questions, please feel free to contact me at [phone].   Thank you,  Kathy Lopez RN BSN CDS  Options provided:  -- Metabolic encephalopathy  -- Other - I will add my own diagnosis  -- Disagree - Not applicable / Not valid  -- Disagree - Clinically unable to determine / Unknown  -- Refer to Clinical Documentation Reviewer    PROVIDER RESPONSE TEXT:    Infectious encephalopathy    Query created by: Anders Aase on 2021 12:18 PM      Electronically signed by:  Pamela Hung MD 2021 12:38 PM

## 2021-01-21 NOTE — PROGRESS NOTES
no mass. There is no abdominal tenderness. There is no rebound. Neurological:   Some confusion       Blood pressure (!) 150/83, pulse 77, temperature 98.2 °F (36.8 °C), temperature source Oral, resp. rate 18, height 6' 6\" (1.981 m), weight 220 lb 1.6 oz (99.8 kg), SpO2 98 %.       .   Lab Results   Component Value Date    WBC 5.6 01/21/2021    HGB 14.5 01/21/2021    HCT 44.9 01/21/2021    MCV 89.8 01/21/2021     01/21/2021     Lab Results   Component Value Date     01/21/2021    K 5.5 01/21/2021     01/21/2021    CO2 16 01/21/2021    BUN 17 01/21/2021    CREATININE 1.28 01/21/2021    GLUCOSE 166 01/21/2021    GLUCOSE 79 05/09/2012    CALCIUM 9.9 01/21/2021            PLAN:    Septic meningitis    Likely viral    Continue acyclovir

## 2021-01-21 NOTE — PROGRESS NOTES
Hospitalist Progress Note      PCP: Abelardo Tran MD    Date of Admission: 1/20/2021    Chief Complaint:  No acute events, afebrile, stable HD, remains alert to self only per nursing staff, very poor historian    Medications:  Reviewed    Infusion Medications    sodium chloride 75 mL/hr at 01/21/21 0129    dextrose       Scheduled Medications    sodium polystyrene  15 g Oral Once    allopurinol  300 mg Oral Daily    brimonidine  1 drop Both Eyes BID    gabapentin  300 mg Oral Nightly    levothyroxine  100 mcg Oral Daily    atorvastatin  10 mg Oral Daily    sodium chloride flush  10 mL Intravenous 2 times per day    enoxaparin  40 mg Subcutaneous Daily    acyclovir  5 mg/kg Intravenous Q8H    insulin lispro  0-12 Units Subcutaneous 4x Daily AC & HS     PRN Meds: LORazepam, levalbuterol, sodium chloride flush, promethazine **OR** ondansetron, polyethylene glycol, acetaminophen **OR** acetaminophen, glucose, dextrose, glucagon (rDNA), dextrose, labetalol      Intake/Output Summary (Last 24 hours) at 1/21/2021 1244  Last data filed at 1/21/2021 0834  Gross per 24 hour   Intake 990 ml   Output 800 ml   Net 190 ml       Exam:    BP (!) 150/83   Pulse 77   Temp 98.2 °F (36.8 °C) (Oral)   Resp 18   Ht 6' 6\" (1.981 m)   Wt 220 lb 1.6 oz (99.8 kg)   SpO2 98%   BMI 25.44 kg/m²     General appearance: Alert to self only, cooperative. Respiratory:  clear to auscultation, bilaterally   Cardiovascular: Regular rate and rhythm, S1/S2. Abdomen: Soft, active bowel sounds. Musculoskeletal: No edema bilaterally.       Labs:   Recent Labs     01/19/21  1728 01/20/21  0730 01/21/21  0626   WBC 6.9 5.6 5.6   HGB 15.0 14.4 14.5   HCT 46.6 42.5 44.9    161 163     Recent Labs     01/19/21  1728 01/20/21  0730 01/21/21  0626    139 134*   K 4.2 4.1 5.5*    105 103   CO2 23 20 16*   BUN 24* 20 17   CREATININE 1.64* 1.41* 1.28*   CALCIUM 10.2* 9.7 9.9     Recent Labs     01/19/21  1728 01/20/21  0730 01/21/21  0626   AST 17 17 19   ALT 10 10 8   BILITOT 0.6 0.5 0.6   ALKPHOS 94 87 84     Recent Labs     01/19/21  1728   INR 1.0     Recent Labs     01/19/21  1728   CKTOTAL 80   TROPONINI <0.010       Urinalysis:      Lab Results   Component Value Date    NITRU Negative 01/19/2021    BLOODU Negative 01/19/2021    SPECGRAV 1.015 01/19/2021    GLUCOSEU Negative 01/19/2021       Radiology:  MRI BRAIN W WO CONTRAST    (Results Pending)   MRA HEAD WO CONTRAST    (Results Pending)           Assessment/Plan:    72 y.o. male with history of COPD, DM, HTN who presented with:    Acute encephalopathy  - likely due to CNS infection  - LP showed pleyocytosis with lymphocytic predominance  - on IV Acyclovir   - CSF and blood cultures no growth  - followed by ID and neurology    FER  - improved    Hyperkalemia   - mild,   - Kayexalate once  - monitor     DM2 with hyperglycemia  - ISS, hypoglycemia protocol            Electronically signed by Sunita Live MD on 1/21/2021 at 12:44 PM

## 2021-01-21 NOTE — PROGRESS NOTES
[facility] AT [facility] Respiratory Therapy Evaluation   Current Order: alb q4 prn       Home Regimen: y      Ordering Physician: mery  Re-evaluation Date:  eval done     Diagnosis: AMS      Patient Status: Stable / Unstable + Physician notified    The following MDI Criteria must be met in order to convert aerosol to MDI with spacer. If unable to meet, MDI will be converted to aerosol:  []  Patient able to demonstrate the ability to use MDI effectively  []  Patient alert and cooperative  []  Patient able to take deep breath with 5-10 second hold  []  Medication(s) available in this delivery method   []  Peak flow greater than or equal to 200 ml/min            Current Order Substituted To  (same drug, same frequency)   Aerosol to MDI [] Albuterol Sulfate 0.083% unit dose by aerosol Albuterol Sulfate MDI 2 puffs by inhalation with spacer    [] Levalbuterol 1.25 mg unit dose by aerosol Levalbuterol MDI 2 puffs by inhalation with spacer    [] Levalbuterol 0.63 mg unit dose by aerosol Levalbuterol MDI 2 puffs by inhalation with spacer    [] Ipratropium Bromide 0.02% unit dose by aerosol Ipratropium Bromide MDI 2 puffs by inhalation with spacer    [] Duoneb (Ipratropium + Albuterol) unit dose by aerosol Ipratropium MDI + Albuterol MDI 2 puffs by inhalation w/spacer   MDI to Aerosol [] Albuterol Sulfate MDI Albuterol Sulfate 0.083% unit dose by aerosol    [] Levalbuterol MDI 2 puffs by inhalation Levalbuterol 1.25 mg unit dose by aerosol    [] Ipratropium Bromide MDI by inhalation Ipratropium Bromide 0.02% unit dose by aerosol    [] Combivent (Ipratropium + Albuterol) MDI by inhalation Duoneb (Ipratropium + Albuterol) unit dose by aerosol   Treatment Assessment [Frequency/Schedule]:  Change frequency to: _________________NO CHANGE_________________________________per Protocol, P&T, MEC      Points 0 1 2 3 4   Pulmonary Status  Non-Smoker  []   Smoking history   < 20 pack years  []   Smoking history  ?  20 pack years  []   Pulmonary Disorder  (acute or chronic)  [x]   Severe or Chronic w/ Exacerbation  []     Surgical Status No [x]   Surgeries     General []   Surgery Lower []   Abdominal Thoracic or []   Upper Abdominal Thoracic with  PulmonaryDisorder  []     Chest X-ray Clear/Not  Ordered     [x]  Chronic Changes  Results Pending  []  Infiltrates, atelectasis, pleural effusion, or edema  []  Infiltrates in more than one lobe []  Infiltrate + Atelectasis, &/or pleural effusion  []    Respiratory Pattern Regular,  RR = 12-20 [x]  Increased,  RR = 21-25 []  GARCIA, irregular,  or RR = 26-30 []  Decreased FEV1  or RR = 31-35 []  Severe SOB, use  of accessory muscles, or RR ? 35  []    Mental Status Alert, oriented,  Cooperative []  Confused but Follows commands [x]  Lethargic or unable to follow commands []  Obtunded  []  Comatose  []    Breath Sounds Clear to  auscultation  [x]  Decreased unilaterally or  in bases only []  Decreased  bilaterally  []  Crackles or intermittent wheezes []  Wheezes []    Cough Strong, Spontan., & nonproductive [x]  Strong,  spontaneous, &  productive []  Weak,  Nonproductive []  Weak, productive or  with wheezes []  No spontaneous  cough or may require suctioning []    Level of Activity Ambulatory []  Ambulatory w/ Assist  [x]  Non-ambulatory []  Paraplegic []  Quadriplegic []    Total    Score:___5____     Triage Score:__5______      Tri       Triage:     1. (>20) Freq: Q3    2. (16-20) Freq: Q4   3. (11-15) Freq: QID & Albuterol Q2 PRN    4. (6-10) Freq: TID & Albuterol Q2 PRN    5. (0-5) Freq Q4prn

## 2021-01-21 NOTE — PROGRESS NOTES
Pt is oriented to self. Very confused. Delayed responses. Pt is normally oriented x4 and independent at home. Pt does ambulate with stand by assist. Bed alarm on. Call light in reach.     Electronically signed by Mary Torre RN on 1/21/2021 at 11:55 AM

## 2021-01-22 LAB
ALBUMIN SERPL-MCNC: 3.8 G/DL (ref 3.5–4.6)
ALP BLD-CCNC: 81 U/L (ref 35–104)
ALT SERPL-CCNC: 10 U/L (ref 0–41)
ANION GAP SERPL CALCULATED.3IONS-SCNC: 12 MEQ/L (ref 9–15)
AST SERPL-CCNC: 18 U/L (ref 0–40)
BASOPHILS ABSOLUTE: 0.1 K/UL (ref 0–0.2)
BASOPHILS RELATIVE PERCENT: 1.2 %
BILIRUB SERPL-MCNC: 0.8 MG/DL (ref 0.2–0.7)
BUN BLDV-MCNC: 16 MG/DL (ref 8–23)
CALCIUM SERPL-MCNC: 9.8 MG/DL (ref 8.5–9.9)
CHLORIDE BLD-SCNC: 106 MEQ/L (ref 95–107)
CO2: 24 MEQ/L (ref 20–31)
CREAT SERPL-MCNC: 1.33 MG/DL (ref 0.7–1.2)
EOSINOPHILS ABSOLUTE: 0.3 K/UL (ref 0–0.7)
EOSINOPHILS RELATIVE PERCENT: 5.1 %
GFR AFRICAN AMERICAN: >60
GFR NON-AFRICAN AMERICAN: 53.2
GLOBULIN: 3.3 G/DL (ref 2.3–3.5)
GLUCOSE BLD-MCNC: 123 MG/DL (ref 60–115)
GLUCOSE BLD-MCNC: 141 MG/DL (ref 60–115)
GLUCOSE BLD-MCNC: 148 MG/DL (ref 60–115)
GLUCOSE BLD-MCNC: 155 MG/DL (ref 70–99)
GLUCOSE BLD-MCNC: 93 MG/DL (ref 60–115)
HCT VFR BLD CALC: 42.1 % (ref 42–52)
HEMOGLOBIN: 13.7 G/DL (ref 14–18)
LYMPHOCYTES ABSOLUTE: 1.7 K/UL (ref 1–4.8)
LYMPHOCYTES RELATIVE PERCENT: 27.1 %
MAGNESIUM: 2 MG/DL (ref 1.7–2.4)
MCH RBC QN AUTO: 28.2 PG (ref 27–31.3)
MCHC RBC AUTO-ENTMCNC: 32.7 % (ref 33–37)
MCV RBC AUTO: 86.4 FL (ref 80–100)
MONOCYTES ABSOLUTE: 0.8 K/UL (ref 0.2–0.8)
MONOCYTES RELATIVE PERCENT: 12.9 %
NEUTROPHILS ABSOLUTE: 3.3 K/UL (ref 1.4–6.5)
NEUTROPHILS RELATIVE PERCENT: 53.7 %
PDW BLD-RTO: 15.6 % (ref 11.5–14.5)
PERFORMED ON: ABNORMAL
PERFORMED ON: NORMAL
PLATELET # BLD: 168 K/UL (ref 130–400)
POTASSIUM REFLEX MAGNESIUM: 4 MEQ/L (ref 3.4–4.9)
RBC # BLD: 4.87 M/UL (ref 4.7–6.1)
SODIUM BLD-SCNC: 142 MEQ/L (ref 135–144)
TOTAL PROTEIN: 7.1 G/DL (ref 6.3–8)
WBC # BLD: 6.2 K/UL (ref 4.8–10.8)

## 2021-01-22 PROCEDURE — 99232 SBSQ HOSP IP/OBS MODERATE 35: CPT | Performed by: INTERNAL MEDICINE

## 2021-01-22 PROCEDURE — 99233 SBSQ HOSP IP/OBS HIGH 50: CPT | Performed by: PSYCHIATRY & NEUROLOGY

## 2021-01-22 PROCEDURE — 80053 COMPREHEN METABOLIC PANEL: CPT

## 2021-01-22 PROCEDURE — 6370000000 HC RX 637 (ALT 250 FOR IP): Performed by: INTERNAL MEDICINE

## 2021-01-22 PROCEDURE — 1210000000 HC MED SURG R&B

## 2021-01-22 PROCEDURE — 6360000002 HC RX W HCPCS: Performed by: NURSE PRACTITIONER

## 2021-01-22 PROCEDURE — 2580000003 HC RX 258: Performed by: NURSE PRACTITIONER

## 2021-01-22 PROCEDURE — 83735 ASSAY OF MAGNESIUM: CPT

## 2021-01-22 PROCEDURE — 6360000002 HC RX W HCPCS: Performed by: INTERNAL MEDICINE

## 2021-01-22 PROCEDURE — 85025 COMPLETE CBC W/AUTO DIFF WBC: CPT

## 2021-01-22 PROCEDURE — 2580000003 HC RX 258: Performed by: INTERNAL MEDICINE

## 2021-01-22 PROCEDURE — 36415 COLL VENOUS BLD VENIPUNCTURE: CPT

## 2021-01-22 RX ORDER — ACYCLOVIR 200 MG/1
800 CAPSULE ORAL 3 TIMES DAILY
Status: DISCONTINUED | OUTPATIENT
Start: 2021-01-22 | End: 2021-01-23 | Stop reason: HOSPADM

## 2021-01-22 RX ORDER — ACYCLOVIR 800 MG/1
800 TABLET ORAL 3 TIMES DAILY
Qty: 30 TABLET | Refills: 0 | Status: SHIPPED | OUTPATIENT
Start: 2021-01-22 | End: 2021-02-01

## 2021-01-22 RX ADMIN — ENOXAPARIN SODIUM 40 MG: 40 INJECTION SUBCUTANEOUS at 08:15

## 2021-01-22 RX ADMIN — LEVOTHYROXINE SODIUM 100 MCG: 100 TABLET ORAL at 06:20

## 2021-01-22 RX ADMIN — BRIMONIDINE TARTRATE 1 DROP: 2 SOLUTION OPHTHALMIC at 21:57

## 2021-01-22 RX ADMIN — ACYCLOVIR 800 MG: 200 CAPSULE ORAL at 21:53

## 2021-01-22 RX ADMIN — ACYCLOVIR SODIUM 500 MG: 50 INJECTION, SOLUTION INTRAVENOUS at 03:47

## 2021-01-22 RX ADMIN — BRIMONIDINE TARTRATE 1 DROP: 2 SOLUTION OPHTHALMIC at 08:16

## 2021-01-22 RX ADMIN — ACYCLOVIR 800 MG: 200 CAPSULE ORAL at 15:34

## 2021-01-22 RX ADMIN — GABAPENTIN 300 MG: 300 CAPSULE ORAL at 21:52

## 2021-01-22 RX ADMIN — ALLOPURINOL 300 MG: 300 TABLET ORAL at 08:15

## 2021-01-22 RX ADMIN — SODIUM CHLORIDE: 9 INJECTION, SOLUTION INTRAVENOUS at 15:48

## 2021-01-22 RX ADMIN — ATORVASTATIN CALCIUM 10 MG: 10 TABLET, FILM COATED ORAL at 21:53

## 2021-01-22 ASSESSMENT — ENCOUNTER SYMPTOMS
GASTROINTESTINAL NEGATIVE: 1
RESPIRATORY NEGATIVE: 1

## 2021-01-22 NOTE — PROGRESS NOTES
Neurology Follow up    SUBJECTIVE: Patient is doing better though I was told that he does have some confusion hallucinations and is very oriented. Denies any headache.     Current Facility-Administered Medications   Medication Dose Route Frequency Provider Last Rate Last Admin    acyclovir (ZOVIRAX) capsule 800 mg  800 mg Oral TID Rhoderick Boxer, MD   800 mg at 01/22/21 1534    allopurinol (ZYLOPRIM) tablet 300 mg  300 mg Oral Daily Jodie Chase MD   300 mg at 01/22/21 0815    brimonidine (ALPHAGAN) 0.2 % ophthalmic solution 1 drop  1 drop Both Eyes BID Jodie Chase MD   1 drop at 01/22/21 0816    gabapentin (NEURONTIN) capsule 300 mg  300 mg Oral Nightly Jodie Chase MD   300 mg at 01/21/21 2023    levothyroxine (SYNTHROID) tablet 100 mcg  100 mcg Oral Daily Jodie Chase MD   100 mcg at 01/22/21 5515    atorvastatin (LIPITOR) tablet 10 mg  10 mg Oral Nightly Jodie Chase MD   10 mg at 01/21/21 2023    albuterol (PROVENTIL) nebulizer solution 2.5 mg  2.5 mg Nebulization Q4H PRN Jodie Chase MD        sodium chloride flush 0.9 % injection 10 mL  10 mL Intravenous 2 times per day TAMIR Quezada CNP   10 mL at 01/20/21 2044    sodium chloride flush 0.9 % injection 10 mL  10 mL Intravenous PRN TAMIR Quezada CNP        enoxaparin (LOVENOX) injection 40 mg  40 mg Subcutaneous Daily TAMIR Cook CNP   40 mg at 01/22/21 0815    promethazine (PHENERGAN) tablet 12.5 mg  12.5 mg Oral Q6H PRN TAMIR Quezada CNP        Or    ondansetron [facility]) injection 4 mg  4 mg Intravenous Q6H PRN TAMIR Quezada CNP        polyethylene glycol (GLYCOLAX) packet 17 g  17 g Oral Daily PRN TAMIR Quezada CNP        acetaminophen (TYLENOL) tablet 650 mg  650 mg Oral Q6H PRN TAMIR Quezada CNP        Or    acetaminophen (TYLENOL) suppository 650 mg  650 mg Rectal Q6H PRN Stan Mustafa, APRN - CNP        0.9 % sodium chloride infusion   Intravenous Continuous TAMIR Vaughan CNP 75 mL/hr at 01/22/21 1548 New Bag at 01/22/21 1548    glucose (GLUTOSE) 40 % oral gel 15 g  15 g Oral PRN TAMIR Vaughan - CNP        dextrose 50 % IV solution  12.5 g Intravenous PRN TAMIR Vaughan - CNP        glucagon (rDNA) injection 1 mg  1 mg Intramuscular PRN Nacho Boudreaux, TAMIR - CNP        dextrose 5 % solution  100 mL/hr Intravenous PRN TAMIR Vaughan - CNP        insulin lispro (HUMALOG) injection vial 0-12 Units  0-12 Units Subcutaneous 4x Daily AC & HS Cheryn Danas Sedar, DO   2 Units at 01/21/21 0848    labetalol (NORMODYNE;TRANDATE) injection 10 mg  10 mg Intravenous Q4H PRN Cheryn Danas Sedar, DO   10 mg at 01/20/21 2100       PHYSICAL EXAM:    General Appearance:    BP (!) 152/87   Pulse 87   Temp 98.6 °F (37 °C) (Oral)   Resp 18   Ht 6' 6\" (1.981 m)   Wt 220 lb 1.6 oz (99.8 kg)   SpO2 99%   BMI 25.44 kg/m²     Skin:  normal  CVS - Normal sounds, No murmurs , No carotid Bruits  RS -CTA  Abdomen Soft, BS present  Review of Systems   Mental Status Exam:             Level of Alertness:   awake            Orientation:   person, place, time                      Attention/Concentration:  normal            Language:  normal      Funduscopic Exam:     Cranial Nerves            Cranial nerve III           Pupils:  equal, round, reactive to light      Cranial nerves III, IV, VI           Extraocular Movements: intact      Cranial nerve V           Facial sensation:  intact      Cranial nerve VII           Facial strength: intact      Cranial nerve VIII           Hearing:  intact      Cranial nerve IX           Palate:  intact      Cranial nerve XI         Shoulder shrug:  intact      Cranial nerve XII          Tongue movement:  normal    Motor:    Drift:  absent  Motor exam is symmetrical 5 out of 5 all extremities bilaterally  Tone:  normal  Abnormal Movements:  absent            Sensory:        Pinprick             Right Upper Extremity:  normal             Left Upper Extremity:  normal             Right Lower Extremity:  normal             Left Lower Extremity:  normal           Vibration                         Touch            Proprioception                 Coordination:           Finger/Nose   Right:  normal              Left:  normal          Heel-Knee-Shin                Right:  normal              Left:  normal          Rapid Alternating Movements              Right:  normal              Left:  normal          Gait:                       Casual:  normal                         Romberg:  normal            Reflexes:             Deep Tendon Reflexes:             Reflexes are 2 +             Plantar response:                Right:  downgoing               Left:  downgoing    Vascular:  Cardiac Exam:  normal         Mri Brain W Wo Contrast    Result Date: 1/21/2021  EXAMINATION: MRI BRAIN W WO CONTRAST DATE AND TIME:1/21/2021 2:33 PM CLINICAL HISTORY: Headache   encephalitis  COMPARISON: None TECHNIQUE:  Multi-sequence multiplanar imaging of the brain was performed. Sequences included T1-weighted, T2-weighted, FLAIR, diffusion-weighted, ADC maps, and  susceptibility weighted imaging. VOLUME: 20 mL   MR CONTRAST: ProHance FINDINGS Acute change: No restricted diffusion to suggest an acute infarct. Magnetic susceptibility:There are no areas of abnormal magnetic susceptibility to suggest the presence of any acute blood products. Ventricles: Ventricles and sulci are age-appropriate. Brain parenchyma There are nonspecific  white matter hyperintensities likely related to chronic microvascular ischemic change. No brain parenchymal or leptomeningeal enhancement. Mass: No mass or mass effect. No extra-axial fluid                                                 Brainstem:Brainstem is unremarkable. Skull base: Cerebellar tonsils are normally positioned.  No brain is negative and there are no meningeal focus and MRV readings are pending but I do not see anything significant. Patient's creatinine is better    Patient may be discharged home in 1 or 2 days keep in mind that confusion is likely to stay      Ollie Justin MD, Marilyn Shahid, American Board of Psychiatry & Neurology  Board Certified in Vascular Neurology  Board Certified in Neuromuscular Medicine  Certified in . [place] 38

## 2021-01-22 NOTE — PROGRESS NOTES
Infectious Disease     Patient Name: Saroj Rodriguez  Date: 1/22/2021  YOB: 1950  Medical Record Number: [id number]        Aseptic meningitis          Presents with altered mental status less responsive less talkative fevers    Lumbar puncture performed showing leukocytosis 23 white cells          1/20/2021  3:40 AM - Buck, Chpo Incoming Lab Results From Soft    Component Value Ref Range & Units Status Collected Lab   Color, CSF Colorless   Final 01/19/2021 11:54 PM [address] [facility]   Appearance, CSF Clear   Final 01/19/2021 11:54 PM [address] [facility]   Tube Number + CELL CT + DIFF-CSF Tube 4   Final 01/19/2021 11:54 PM [address] [facility]   Clot Evaluation CSF see below   Final 01/19/2021 11:54 PM [address] [facility]   No Clots Seen   WBC, CSF 23High Panic   0 - 8 K/uL Final 01/19/2021 11:54 PM [address] [facility]   RBC,   K/uL Final 01/19/2021 11:54 PM [address] [facility]   Neutrophils, CSF 1  % Final 01/19/2021 11:54 PM  - [facility]   Lymphs, CSF 92  % Final 01/19/2021 11:54 PM  - [facility]   Monocytes, CSF 4  % Final 01/19/2021 11:54 PM [address] [facility]   Eosinophils, CSF 3  % Final 01/19/2021 11:54 PM [address] [facility]   Path Consult CSF Yes   Final 01/19/2021 11:54 PM [address] [facility]       Glucose, CSF 96High   50 - 70 mg/dL Final     Protein, CSF 71High   15 - 45 mg/dL Final       SARS-CoV-2, NAAT Not Detected  Not Detected Final 01/19/2021          Review of Systems   Constitutional: Negative for chills, diaphoresis, fatigue and fever. Respiratory: Negative. Cardiovascular: Negative. Gastrointestinal: Negative. Neurological: Negative for headaches. Physical Exam   Cardiovascular: Normal heart sounds. No murmur heard. Pulmonary/Chest: No respiratory distress. He has no wheezes. He has no rales. Abdominal: Soft. He exhibits no distension and no mass. There is no abdominal tenderness. There is no rebound. Neurological:   Some confusion       Blood pressure (!) 152/87, pulse 87, temperature 98.6 °F (37 °C), temperature source Oral, resp. rate 18, height 6' 6\" (1.981 m), weight 220 lb 1.6 oz (99.8 kg), SpO2 99 %. .   Lab Results   Component Value Date    WBC 6.2 01/22/2021    HGB 13.7 (L) 01/22/2021    HCT 42.1 01/22/2021    MCV 86.4 01/22/2021     01/22/2021     Lab Results   Component Value Date     01/22/2021    K 4.0 01/22/2021     01/22/2021    CO2 24 01/22/2021    BUN 16 01/22/2021    CREATININE 1.33 01/22/2021    GLUCOSE 155 01/22/2021    GLUCOSE 79 05/09/2012    CALCIUM 9.8 01/22/2021            PLAN:    aseptic meningitis    Likely viral    [facility]

## 2021-01-22 NOTE — PROGRESS NOTES
Pt is a&o to person only. He is hallucinating, sees big dogs in the hallway. He is not easily redirectable. I updated his spouse this morning. Pt has no complaints, comfortable at this time. Vitals stable. Call light in reach, bed alarm on.

## 2021-01-22 NOTE — PROGRESS NOTES
Hospitalist Progress Note      PCP: Rubia Blas MD    Date of Admission: 1/20/2021    Chief Complaint:  No acute events, afebrile, stable HD,     Medications:  Reviewed    Infusion Medications    sodium chloride 75 mL/hr at 01/21/21 2026    dextrose       Scheduled Medications    allopurinol  300 mg Oral Daily    brimonidine  1 drop Both Eyes BID    gabapentin  300 mg Oral Nightly    levothyroxine  100 mcg Oral Daily    atorvastatin  10 mg Oral Nightly    sodium chloride flush  10 mL Intravenous 2 times per day    enoxaparin  40 mg Subcutaneous Daily    acyclovir  5 mg/kg Intravenous Q8H    insulin lispro  0-12 Units Subcutaneous 4x Daily AC & HS     PRN Meds: albuterol, sodium chloride flush, promethazine **OR** ondansetron, polyethylene glycol, acetaminophen **OR** acetaminophen, glucose, dextrose, glucagon (rDNA), dextrose, labetalol      Intake/Output Summary (Last 24 hours) at 1/22/2021 1234  Last data filed at 1/22/2021 1016  Gross per 24 hour   Intake 1304 ml   Output 1150 ml   Net 154 ml       Exam:    BP (!) 152/87   Pulse 87   Temp 98.6 °F (37 °C) (Oral)   Resp 18   Ht 6' 6\" (1.981 m)   Wt 220 lb 1.6 oz (99.8 kg)   SpO2 99%   BMI 25.44 kg/m²     General appearance: Alert to self only, cooperative. Respiratory:  clear to auscultation, bilaterally   Cardiovascular: Regular rate and rhythm, S1/S2. Abdomen: Soft, active bowel sounds. Musculoskeletal: No edema bilaterally.       Labs:   Recent Labs     01/20/21  0730 01/21/21  0626 01/22/21  0622   WBC 5.6 5.6 6.2   HGB 14.4 14.5 13.7*   HCT 42.5 44.9 42.1    163 168     Recent Labs     01/20/21  0730 01/21/21  0626 01/22/21  0622    134* 142   K 4.1 5.5* 4.0    103 106   CO2 20 16* 24   BUN 20 17 16   CREATININE 1.41* 1.28* 1.33*   CALCIUM 9.7 9.9 9.8     Recent Labs     01/20/21  0730 01/21/21  0626 01/22/21  0622   AST 17 19 18   ALT 10 8 10   BILITOT 0.5 0.6 0.8*   ALKPHOS 87 84 81     Recent Labs 01/19/21  1728   INR 1.0     Recent Labs     01/19/21  1728   CKTOTAL 102   TROPONINI <0.010       Urinalysis:      Lab Results   Component Value Date    NITRU Negative 01/19/2021    BLOODU Negative 01/19/2021    SPECGRAV 1.015 01/19/2021    GLUCOSEU Negative 01/19/2021       Radiology:  MRI BRAIN W WO CONTRAST   Final Result   NEGATIVE MRI OF THE BRAIN. NO MRI EVIDENCE OF ENCEPHALITIS. EXAMINATION: MRI BRAIN W WO CONTRAST      DATE AND TIME:1/21/2021 2:33 PM      CLINICAL HISTORY: R41.82 AMS (altered mental status) ICD10   encephalitis        COMPARISON: None available. TECHNIQUE: 3-D TIME-OF-FLIGHT MRV IMAGES OF THE INTRACRANIAL CIRCULATION WERE OBTAINED. FINDINGS:                   Dural sinuses: Flow is visualized within the superior, straight, and inferior sagittal sinuses. Flow  also visualized within the transverse and sigmoid sinuses, and jugular veins. Flow is seen within the internal cerebral veins and vein of    Jack. Other findings:None      IMPRESSION:  There is no evidence for sinus thrombosis. MRA HEAD WO CONTRAST    (Results Pending)           Assessment/Plan:    72 y.o. male with history of COPD, DM, HTN who presented with headache and altered mental status.     Acute encephalopathy  - likely due to CNS infection  - LP showed pleyocytosis with lymphocytic predominance  - MRI of the brain was negative for acute findings  - on IV Acyclovir   - CSF and blood cultures no growth  - followed by ID and neurology    FER  - improved    Hyperkalemia   - resolved     DM2 with hyperglycemia  - ISS, hypoglycemia protocol            Electronically signed by Saran Holloway MD on 1/22/2021 at 12:34 PM

## 2021-01-23 VITALS
TEMPERATURE: 97.9 F | OXYGEN SATURATION: 97 % | DIASTOLIC BLOOD PRESSURE: 74 MMHG | HEART RATE: 79 BPM | HEIGHT: 78 IN | RESPIRATION RATE: 16 BRPM | SYSTOLIC BLOOD PRESSURE: 150 MMHG | WEIGHT: 220.1 LBS | BODY MASS INDEX: 25.47 KG/M2

## 2021-01-23 LAB
CSF CULTURE: NORMAL
GLUCOSE BLD-MCNC: 102 MG/DL (ref 60–115)
GLUCOSE BLD-MCNC: 104 MG/DL (ref 60–115)
GLUCOSE BLD-MCNC: 116 MG/DL (ref 60–115)
GRAM STAIN RESULT: NORMAL
PERFORMED ON: ABNORMAL
PERFORMED ON: NORMAL
PERFORMED ON: NORMAL

## 2021-01-23 PROCEDURE — 99232 SBSQ HOSP IP/OBS MODERATE 35: CPT | Performed by: NURSE PRACTITIONER

## 2021-01-23 PROCEDURE — 6360000002 HC RX W HCPCS: Performed by: NURSE PRACTITIONER

## 2021-01-23 PROCEDURE — 2580000003 HC RX 258: Performed by: NURSE PRACTITIONER

## 2021-01-23 PROCEDURE — 6370000000 HC RX 637 (ALT 250 FOR IP): Performed by: INTERNAL MEDICINE

## 2021-01-23 RX ADMIN — BRIMONIDINE TARTRATE 1 DROP: 2 SOLUTION OPHTHALMIC at 08:20

## 2021-01-23 RX ADMIN — ENOXAPARIN SODIUM 40 MG: 40 INJECTION SUBCUTANEOUS at 08:16

## 2021-01-23 RX ADMIN — SODIUM CHLORIDE: 9 INJECTION, SOLUTION INTRAVENOUS at 08:16

## 2021-01-23 RX ADMIN — LEVOTHYROXINE SODIUM 100 MCG: 100 TABLET ORAL at 08:17

## 2021-01-23 RX ADMIN — ALLOPURINOL 300 MG: 300 TABLET ORAL at 08:17

## 2021-01-23 RX ADMIN — ACYCLOVIR 800 MG: 200 CAPSULE ORAL at 08:17

## 2021-01-23 RX ADMIN — ACYCLOVIR 800 MG: 200 CAPSULE ORAL at 17:32

## 2021-01-23 ASSESSMENT — ENCOUNTER SYMPTOMS
WHEEZING: 0
CHEST TIGHTNESS: 0
SHORTNESS OF BREATH: 0
TROUBLE SWALLOWING: 0
NAUSEA: 0
VOMITING: 0
COUGH: 0
COLOR CHANGE: 0

## 2021-01-23 NOTE — PROGRESS NOTES
Hospitalist Progress Note      PCP: Marianna Jones MD    Date of Admission: 1/20/2021    Chief Complaint:  No acute events, afebrile, stable HD,     Medications:  Reviewed    Infusion Medications    sodium chloride 75 mL/hr at 01/23/21 0816    dextrose       Scheduled Medications    acyclovir  800 mg Oral TID    allopurinol  300 mg Oral Daily    brimonidine  1 drop Both Eyes BID    gabapentin  300 mg Oral Nightly    levothyroxine  100 mcg Oral Daily    atorvastatin  10 mg Oral Nightly    sodium chloride flush  10 mL Intravenous 2 times per day    enoxaparin  40 mg Subcutaneous Daily    insulin lispro  0-12 Units Subcutaneous 4x Daily AC & HS     PRN Meds: albuterol, sodium chloride flush, promethazine **OR** ondansetron, polyethylene glycol, acetaminophen **OR** acetaminophen, glucose, dextrose, glucagon (rDNA), dextrose, labetalol      Intake/Output Summary (Last 24 hours) at 1/23/2021 1350  Last data filed at 1/23/2021 1346  Gross per 24 hour   Intake 1550 ml   Output 800 ml   Net 750 ml       Exam:    BP (!) 150/74   Pulse 79   Temp 97.9 °F (36.6 °C) (Oral)   Resp 16   Ht 6' 6\" (1.981 m)   Wt 220 lb 1.6 oz (99.8 kg)   SpO2 97%   BMI 25.44 kg/m²     General appearance: Alert to self only, cooperative. Respiratory:  clear to auscultation, bilaterally   Cardiovascular: Regular rate and rhythm, S1/S2. Abdomen: Soft, active bowel sounds. Musculoskeletal: No edema bilaterally. Labs:   Recent Labs     01/21/21  0626 01/22/21 0622   WBC 5.6 6.2   HGB 14.5 13.7*   HCT 44.9 42.1    168     Recent Labs     01/21/21  0626 01/22/21 0622   * 142   K 5.5* 4.0    106   CO2 16* 24   BUN 17 16   CREATININE 1.28* 1.33*   CALCIUM 9.9 9.8     Recent Labs     01/21/21  0626 01/22/21 0622   AST 19 18   ALT 8 10   BILITOT 0.6 0.8*   ALKPHOS 84 81     No results for input(s): INR in the last 72 hours. No results for input(s): Onetha Swathi in the last 72 hours.     Urinalysis: Lab Results   Component Value Date    NITRU Negative 01/19/2021    BLOODU Negative 01/19/2021    SPECGRAV 1.015 01/19/2021    GLUCOSEU Negative 01/19/2021       Radiology:  MRI BRAIN W WO CONTRAST   Final Result   NEGATIVE MRI OF THE BRAIN. NO MRI EVIDENCE OF ENCEPHALITIS. EXAMINATION: MRI BRAIN W WO CONTRAST      DATE AND TIME:1/21/2021 2:33 PM      CLINICAL HISTORY: R41.82 AMS (altered mental status) ICD10   encephalitis        COMPARISON: None available. TECHNIQUE: 3-D TIME-OF-FLIGHT MRV IMAGES OF THE INTRACRANIAL CIRCULATION WERE OBTAINED. FINDINGS:                   Dural sinuses: Flow is visualized within the superior, straight, and inferior sagittal sinuses. Flow  also visualized within the transverse and sigmoid sinuses, and jugular veins. Flow is seen within the internal cerebral veins and vein of    Jack. Other findings:None      IMPRESSION:  There is no evidence for sinus thrombosis. MRA HEAD WO CONTRAST    (Results Pending)           Assessment/Plan:    72 y.o. male with history of COPD, DM, HTN who presented with headache and altered mental status.     Acute encephalopathy  - likely due to viral encephalitis  - LP showed pleyocytosis with lymphocytic predominance  - MRI of the brain was negative for acute findings  - CSF and blood cultures no growth  -  treated empirically with IV Acyclovir, switched to PO per ID  - followed by ID and neurology    FER  - improved    Hyperkalemia   - resolved     DM2 with hyperglycemia  - ISS, hypoglycemia protocol    Disposition - home when OK with ID and neurology            Electronically signed by Apollo Matthews MD on 1/23/2021 at 1:50 PM

## 2021-01-23 NOTE — PROGRESS NOTES
[facility] Neurology Daily Progress Note  Name: Reanna Mathis  Age: 72 y.o. Gender: male  CodeStatus: Full Code  Allergies: No Known Allergies    Chief Complaint:No chief complaint on file. Primary Care Provider: Trevon Carolina MD  InpatientTreatment Team: Treatment Team: Attending Provider: Jade Kruse MD; Consulting Physician: Zaire Castillo MD; Consulting Physician: Kd Mccarthy MD; Registered Nurse: Nathalia Go RN; Registered Nurse: Kaushik Gallegos RN; Utilization Reviewer: Lia Davis RN; LPN: Mercy Baird LPN; Nursing Student: Breanna Castro  Admission Date: 1/20/2021      HPI   Pt seen and examined for neuro follow up encephalopathy with viral  Encephalitis. Patient currently alert and oriented x3, no acute distress, cooperative. Still with periods of confusion. No hallucinations currently. Afebrile. No focal neurological deficits. No seizure activity noted. Vitals:    01/23/21 0731   BP: (!) 150/74   Pulse: 79   Resp: 16   Temp: 97.9 °F (36.6 °C)   SpO2: 97%      Review of Systems   Constitutional: Negative for fatigue and fever. HENT: Negative for hearing loss and trouble swallowing. Eyes: Negative for visual disturbance. Respiratory: Negative for cough, chest tightness, shortness of breath and wheezing. Cardiovascular: Negative for chest pain, palpitations and leg swelling. Gastrointestinal: Negative for nausea and vomiting. Skin: Negative for color change and rash. Neurological: Negative for dizziness, tremors, seizures, syncope, facial asymmetry, speech difficulty, weakness, light-headedness, numbness and headaches. Psychiatric/Behavioral: Positive for confusion. Negative for agitation and hallucinations. The patient is not nervous/anxious. Physical Exam  Vitals signs and nursing note reviewed. Constitutional:       General: He is not in acute distress. Appearance: He is not ill-appearing or diaphoretic.    HENT:      Head: Normocephalic and atraumatic. Eyes:      Extraocular Movements: Extraocular movements intact. Pupils: Pupils are equal, round, and reactive to light. Cardiovascular:      Rate and Rhythm: Normal rate and regular rhythm. Pulmonary:      Effort: Pulmonary effort is normal. No respiratory distress. Breath sounds: Normal breath sounds. Abdominal:      General: Bowel sounds are normal.      Palpations: Abdomen is soft. Skin:     General: Skin is warm and dry. Neurological:      General: No focal deficit present. Mental Status: He is alert and oriented to person, place, and time. Cranial Nerves: No cranial nerve deficit. Motor: No weakness, tremor or seizure activity. Coordination: Coordination normal.      Comments: Confused at times               Medications:  Reviewed    Infusion Medications:    sodium chloride 75 mL/hr at 01/23/21 0816    dextrose       Scheduled Medications:    acyclovir  800 mg Oral TID    allopurinol  300 mg Oral Daily    brimonidine  1 drop Both Eyes BID    gabapentin  300 mg Oral Nightly    levothyroxine  100 mcg Oral Daily    atorvastatin  10 mg Oral Nightly    sodium chloride flush  10 mL Intravenous 2 times per day    enoxaparin  40 mg Subcutaneous Daily    insulin lispro  0-12 Units Subcutaneous 4x Daily AC & HS     PRN Meds: albuterol, sodium chloride flush, promethazine **OR** ondansetron, polyethylene glycol, acetaminophen **OR** acetaminophen, glucose, dextrose, glucagon (rDNA), dextrose, labetalol    Labs:   Recent Labs     01/21/21 0626 01/22/21 0622   WBC 5.6 6.2   HGB 14.5 13.7*   HCT 44.9 42.1    168     Recent Labs     01/21/21 0626 01/22/21 0622   * 142   K 5.5* 4.0    106   CO2 16* 24   BUN 17 16   CREATININE 1.28* 1.33*   CALCIUM 9.9 9.8     Recent Labs     01/21/21 0626 01/22/21  0622   AST 19 18   ALT 8 10   BILITOT 0.6 0.8*   ALKPHOS 84 81     No results for input(s): INR in the last 72 hours.   No results for input(s): Cait Calderon in the last 72 hours. Urinalysis:   Lab Results   Component Value Date    NITRU Negative 01/19/2021    BLOODU Negative 01/19/2021    SPECGRAV 1.015 01/19/2021    GLUCOSEU Negative 01/19/2021       Radiology:   Most recent    EEG No procedure found. MRI of Brain   Results for orders placed during the hospital encounter of 01/20/21   MRI BRAIN W WO CONTRAST    Narrative EXAMINATION: MRI BRAIN W [address] AND TIME:1/21/2021 2:33 PM    CLINICAL HISTORY: Headache   encephalitis      COMPARISON: None    TECHNIQUE:  Multi-sequence multiplanar imaging of the brain was performed. Sequences included T1-weighted, T2-weighted, FLAIR, diffusion-weighted, ADC maps, and  susceptibility weighted imaging. VOLUME: 20 mL   MR CONTRAST: ProHance     FINDINGS    Acute change: No restricted diffusion to suggest an acute infarct. Magnetic susceptibility:There are no areas of abnormal magnetic susceptibility to suggest the presence of any acute blood products. Ventricles: Ventricles and sulci are age-appropriate. Brain parenchyma There are nonspecific  white matter hyperintensities likely related to chronic microvascular ischemic change. No brain parenchymal or leptomeningeal enhancement. Mass: No mass or mass effect. No extra-axial fluid                                                     Brainstem:Brainstem is unremarkable. Skull base: Cerebellar tonsils are normally positioned. No evidence of a marrow replacement process. Vasculature: The major intracranial arterial structures and dural venous sinuses showed typical flow void, suggesting patency by spin-echo criteria. Sinuses: The  mastoids and visualized paranasal sinuses are clear. Impression NEGATIVE MRI OF THE BRAIN. NO MRI EVIDENCE OF ENCEPHALITIS.              EXAMINATION: MRI BRAIN W WO CONTRAST    DATE AND TIME:1/21/2021 2:33 PM    CLINICAL HISTORY: R41.82 AMS (altered mental status) ICD10   encephalitis      COMPARISON: None available. TECHNIQUE: 3-D TIME-OF-FLIGHT MRV IMAGES OF THE INTRACRANIAL CIRCULATION WERE OBTAINED. FINDINGS:                 Dural sinuses: Flow is visualized within the superior, straight, and inferior sagittal sinuses. Flow  also visualized within the transverse and sigmoid sinuses, and jugular veins. Flow is seen within the internal cerebral veins and vein of   Jack. Other findings:None    IMPRESSION:  There is no evidence for sinus thrombosis. No results found for this or any previous visit. MRA of the Head and Neck: No results found for this or any previous visit. No results found for this or any previous visit. No results found for this or any previous visit. CT of the Head:   Results for orders placed during the hospital encounter of 01/19/21   CT HEAD WO CONTRAST    Narrative CT HEAD WO CONTRAST : 1/19/2021    CLINICAL HISTORY:  confusion . COMPARISON: None available. TECHNIQUE: Spiral unenhanced images were obtained of the head, with routine multiplanar reconstructions performed. All CT scans at this facility use dose modulation, iterative reconstruction, and/or weight based dosing when appropriate to reduce radiation dose to as low as reasonably achievable. FINDINGS:    There is no intracranial hemorrhage, mass effect, midline shift, extra-axial collection, evidence of hydrocephalus, recent ischemic infarct, or skull fracture identified. Mild generalized cerebral volume loss is present, with moderate patchy supratentorial white matter changes most consistent with chronic small vessel ischemic disease. Chronic near complete opacification and calcification of the right maxillary sinus is present. The mastoid air cells and other visualized paranasal sinuses are essentially clear. Impression NO ACUTE INTRACRANIAL PROCESS IDENTIFIED. No results found for this or any previous visit. No results found for this or any previous visit. Carotid duplex: No results found for this or any previous visit. No results found for this or any previous visit. No results found for this or any previous visit. Echo No results found for this or any previous visit. Assessment/Plan:     Encephalitis with a CSF picture of viral encephalitis. Herpes cannot be ruled out we await the PCR. Patient is on acyclovir. Patient has some degree of hallucination and confusion which is likely to remain for some time as this is one of the effects of encephalitis if it is hippocampal.  Patient is also on acyclovir which can cause the same findings. MRI of the brain is negative and there are no meningeal focus and MRV readings are pending but I do not see anything significant.   Patient's creatinine is better     Patient may be discharged home in 1 or 2 days keep in mind that confusion is likely to stay    MRV still pending   continue p.o. acyclovir  Infectious disease following  Collaborating physicians: Dr Kristal Roberts    Electronically signed by TAMIR Belle CNP on 1/23/2021 at 11:17 AM

## 2021-01-23 NOTE — PROGRESS NOTES
Reviewed discharge instructions with patient and wife. Verbalized understanding. Script given for acyclovir. Wife to drive home.   Electronically signed by Dimitrios Vanegas RN on 1/23/2021 at 5:44 PM

## 2021-01-23 NOTE — DISCHARGE SUMMARY
Hospital Medicine Discharge Summary    Sesar Zepeda  :  1950  MRN:  [id number]    Admit date:  2021  Discharge date:  2021    Admitting Physician:  Michael Reese DO  Primary Care Physician:  Rubia Blas MD      Discharge Diagnoses:    Principal Problem:    AMS (altered mental status)  Active Problems:    Diabetes mellitus ([address]    Hyperlipidemia    Hypothyroid    Hypertension    CKD (chronic kidney disease)    Viral encephalitis  Resolved Problems:    * No resolved hospital problems. *      Hospital Course:   Sesar Zepeda is a 72 y.o. male that was admitted and treated at [facility] for the following medical issues:     Acute encephalopathy  - likely due to viral encephalitis  - LP showed pleyocytosis with lymphocytic predominance  - MRI of the brain was negative for acute findings  - CSF and blood cultures no growth  - treated empirically with IV Acyclovir, switched to PO per ID  - slowly improved  - followed by ID and neurology       Disposition - home       Patient was seen by the following consultants while admitted to [facility]:   Consults:  [address] [place] [address]    Significant Diagnostic Studies:    Mri Brain W Wo Contrast    Result Date: 2021  EXAMINATION: MRI BRAIN W [address] [facility] TIME:2021 2:33 PM CLINICAL HISTORY: Headache   encephalitis  COMPARISON: None TECHNIQUE:  Multi-sequence multiplanar imaging of the brain was performed. Sequences included T1-weighted, T2-weighted, FLAIR, diffusion-weighted, ADC maps, and  susceptibility weighted imaging. VOLUME: 20 mL   MR CONTRAST: ProHance FINDINGS Acute change: No restricted diffusion to suggest an acute infarct. Magnetic susceptibility:There are no areas of abnormal magnetic susceptibility to suggest the presence of any acute blood products. Ventricles: Ventricles and sulci are age-appropriate.  Brain parenchyma There are nonspecific  white matter hyperintensities likely related to chronic microvascular ischemic change. No brain parenchymal or leptomeningeal enhancement. Mass: No mass or mass effect. No extra-axial fluid                                                 Brainstem:Brainstem is unremarkable. Skull base: Cerebellar tonsils are normally positioned. No evidence of a marrow replacement process. Vasculature: The major intracranial arterial structures and dural venous sinuses showed typical flow void, suggesting patency by spin-echo criteria. Sinuses: The  mastoids and visualized paranasal sinuses are clear. NEGATIVE MRI OF THE BRAIN. NO MRI EVIDENCE OF ENCEPHALITIS. EXAMINATION: MRI BRAIN W WO CONTRAST DATE AND TIME:1/21/2021 2:33 PM CLINICAL HISTORY: R41.82 AMS (altered mental status) ICD10   encephalitis  COMPARISON: None available. TECHNIQUE: 3-D TIME-OF-FLIGHT MRV IMAGES OF THE INTRACRANIAL CIRCULATION WERE OBTAINED. FINDINGS:             Dural sinuses: Flow is visualized within the superior, straight, and inferior sagittal sinuses. Flow  also visualized within the transverse and sigmoid sinuses, and jugular veins. Flow is seen within the internal cerebral veins and vein of Jack. Other findings:None IMPRESSION:  There is no evidence for sinus thrombosis. Discharge Medications:       [facility] Medication Instructions PAF:[id number]    Printed on:01/23/21 1655   Medication Information                      acyclovir (ZOVIRAX) 800 MG tablet  Take 1 tablet by mouth 3 times daily for 10 days             allopurinol (ZYLOPRIM) 300 MG tablet               brimonidine (ALPHAGAN) 0.2 % ophthalmic solution  Place 1 drop into both eyes 2 times daily              clonazePAM (KLONOPIN) 2 MG tablet  Take 2 mg by mouth nightly.              gabapentin (NEURONTIN) 300 MG capsule  Take 300 mg by mouth nightly.               levalbuterol (XOPENEX HFA) 45 MCG/ACT inhaler  Inhale 2 puffs into the lungs every 4 hours as needed for Wheezing             levothyroxine (SYNTHROID) 100 MCG tablet  Take 100 mcg by mouth Daily              lovastatin (MEVACOR) 40 MG tablet  Take 40 mg by mouth daily              naproxen (NAPROSYN) 500 MG tablet  Take 500 mg by mouth 2 times daily as needed              predniSONE (DELTASONE) 10 MG tablet  Take 10 mg by mouth daily as needed              Respiratory Therapy Supplies SOURAV  Change CPAP pressure to 6 cm   New CPAP mask and supplies             traMADol (ULTRAM) 50 MG tablet  Take 50 mg by mouth every 6 hours as needed for Pain. Disposition:   Discharged to Home. Any [facility] needs that were indicated and/or required as been addressed and set up by Social Work. Condition at discharge: Pt was medically stable at the time of discharge. Significant improvement in clinical condition compared to initial condition at presentation to hospital    Activity: activity as tolerated, fall precautions. Total time taken for discharging this patient: 40 minutes. Greater than 70% of time was spent focused exclusively on this patient. Time was taken to review chart, discuss plans with consultants, reconciling medications, discussing plan answering questions with patient. SignedUmberto Barahona  1/23/2021, 4:50 PM  ----------------------------------------------------------------------------------------------------------------------    Tyler Milian,     Please return to ER or call 911 if you develop any significant signs or symptoms.     I may not have addressed all of your medical illnesses or the abnormal blood work or imaging therefore please ask your PCP, Halie Johnson MD ,  to obtain [id number] [facility] record to follow up on all of the abnormal labs, imaging and findings that I have and have not addressed during your hospitalization.      Discharging you from the hospital does not mean that your medical care ends here and now.  You may still need additional work up, investigation, monitoring, and treatment to be handled from this point on by outside providers including your PCP, Johnathan Meadows MD , Specialists and other healthcare providers.      Please review your list of discharge medications prior to resuming medications you might still have at home, as the medications you need to be taking, dosages or how often you must take them may have changed. For medication questions, contact your retail pharmacy and your PCP, Johnathan Meadows MD .     ** I STRONGLY RECOMMEND that you follow up with Johnathan Meadows MD within 3 to 5 days for a post hospitalization evaluation. This specific office visit is covered by your insurance, and is not the same as your annual doctor visit/ check up. This office visit is important, as it may prevent need for repeat and/or future hospitalizations. **    Your medical team at [facility] appreciates the opportunity to work with you to get well!     Sincerely,  Royce Tapia

## 2021-01-23 NOTE — PROGRESS NOTES
Assessment completed this shift. Alert and orientedx 3-4. Uncertain of date. Some confusion at times. Calm and cooperative. Lungs clear. BS present with no edema noted. Talking on phone to wife.  Up dated wife via phone this am.  Electronically signed by Mario Keen RN on 1/23/2021 at 9:56 AM

## 2021-01-24 LAB
BLOOD CULTURE, ROUTINE: NORMAL
CULTURE, BLOOD 2: NORMAL

## 2021-03-01 ENCOUNTER — OFFICE VISIT (OUTPATIENT)
Dept: GASTROENTEROLOGY | Age: 71
End: 2021-03-01
Payer: MEDICARE

## 2021-03-01 VITALS
OXYGEN SATURATION: 96 % | HEART RATE: 103 BPM | RESPIRATION RATE: 14 BRPM | HEIGHT: 78 IN | SYSTOLIC BLOOD PRESSURE: 146 MMHG | WEIGHT: 225 LBS | DIASTOLIC BLOOD PRESSURE: 72 MMHG | BODY MASS INDEX: 26.03 KG/M2

## 2021-03-01 DIAGNOSIS — R10.13 DYSPEPSIA: Primary | ICD-10-CM

## 2021-03-01 PROCEDURE — 99212 OFFICE O/P EST SF 10 MIN: CPT | Performed by: SPECIALIST

## 2021-03-01 RX ORDER — METRONIDAZOLE 250 MG/1
250 TABLET ORAL 3 TIMES DAILY
Qty: 21 TABLET | Refills: 0 | Status: ON HOLD | OUTPATIENT
Start: 2021-03-01 | End: 2021-03-05 | Stop reason: HOSPADM

## 2021-03-01 ASSESSMENT — ENCOUNTER SYMPTOMS
BLOOD IN STOOL: 0
DIARRHEA: 0
RESPIRATORY NEGATIVE: 1
ABDOMINAL DISTENTION: 0
CONSTIPATION: 0
NAUSEA: 0
ABDOMINAL PAIN: 0
RECTAL PAIN: 0
ANAL BLEEDING: 0
EYES NEGATIVE: 1
GASTROINTESTINAL NEGATIVE: 1
VOMITING: 0

## 2021-03-01 NOTE — PROGRESS NOTES
Gastroenterology Clinic Follow up Visit    Arsalan Man  [id number]  Chief Complaint   Patient presents with    Follow-up       HPI and A/P at last visit summarized below:  Patient is here for follow-up. Xifaxan was prescribed for possible SIBO but patient was not able to get it because of insurance reasons. Treated with oral Flagyl with improvement in the symptoms, states that he was recently treated for meningitis by Dr. Dayna Justin. Patient took Flagyl for about 2 weeks, patient finished Flagyl about 3 to 4 weeks ago. Patient states that he was feeling better when he took Flagyl and symptoms seem to have recurred when he stopped the Flagyl, possibly of neuropathy on long-term Flagyl was discussed. Review of Systems   Constitutional: Negative. HENT: Negative. Eyes: Negative. Respiratory: Negative. Gastrointestinal: Negative. Negative for abdominal distention, abdominal pain, anal bleeding, blood in stool, constipation, diarrhea, nausea, rectal pain and vomiting. Bloating and excess belching   Genitourinary: Negative. Musculoskeletal: Negative. Neurological: Negative. Psychiatric/Behavioral: Negative. Past medical history, past surgical history, medication list, social and familyhistory reviewed    Blood pressure (!) 143/60, pulse 103, resp. rate 14, height 6' 6\" (1.981 m), weight 225 lb (102.1 kg), SpO2 96 %. Physical Exam  Constitutional:       Appearance: He is well-developed. HENT:      Head: Normocephalic. Eyes:      Pupils: Pupils are equal, round, and reactive to light. Cardiovascular:      Rate and Rhythm: Normal rate and regular rhythm. Heart sounds: Normal heart sounds. Pulmonary:      Effort: Pulmonary effort is normal.      Breath sounds: Normal breath sounds. Abdominal:      General: Bowel sounds are normal.      Palpations: Abdomen is soft. Comments: Soft,nontender, no palpable mass   Skin:     General: Skin is warm and dry. Neurological:      Mental Status: He is alert. Laboratory, Pathology, Radiology reviewed in detail with relevantimportant investigations summarized below:    No results for input(s): WBC, HGB, HCT, MCV, PLT in the last 720 hours. Lab Results   Component Value Date    ALT 10 01/22/2021    AST 18 01/22/2021    ALKPHOS 81 01/22/2021    BILITOT 0.8 (H) 01/22/2021     No results found. Endoscopic investigations:    Assessment and Plan:  Lyndsey Waddell 72 y.o. male for follow up. Dyspepsia. Possible SIBO since patient has responded to oral Flagyl. Symptoms has recurred will give 2 more weeks of Flagyl. Because of burping is probably related to aerophagia and this was discussed with patient. Diagnosis Orders   1. Dyspepsia         Return if symptoms worsen or fail to improve. Christine Liu MD   StaffGastroenterologist  [facility]    Please note this report has been partially produced using speech recognitionsoftware  and may cause contain errors related to that system including grammar, punctuation and spelling as well as words andphrases that may seem inappropriate. If there are questions or concerns please feel free to contact me to clarify.

## 2021-03-04 ENCOUNTER — HOSPITAL ENCOUNTER (OUTPATIENT)
Age: 71
Setting detail: OBSERVATION
Discharge: HOME | End: 2021-03-05
Attending: EMERGENCY MEDICINE | Admitting: INTERNAL MEDICINE
Payer: MEDICARE

## 2021-03-04 ENCOUNTER — APPOINTMENT (OUTPATIENT)
Dept: GENERAL RADIOLOGY | Age: 71
End: 2021-03-04
Payer: MEDICARE

## 2021-03-04 ENCOUNTER — APPOINTMENT (OUTPATIENT)
Dept: CT IMAGING | Age: 71
End: 2021-03-04
Payer: MEDICARE

## 2021-03-04 DIAGNOSIS — R07.89 OTHER CHEST PAIN: ICD-10-CM

## 2021-03-04 DIAGNOSIS — N28.9 ACUTE RENAL INSUFFICIENCY: Primary | ICD-10-CM

## 2021-03-04 PROBLEM — R07.9 CHEST PAIN: Status: ACTIVE | Noted: 2021-03-04

## 2021-03-04 LAB
ALBUMIN SERPL-MCNC: 4.4 G/DL (ref 3.5–4.6)
ALP BLD-CCNC: 97 U/L (ref 35–104)
ALT SERPL-CCNC: 15 U/L (ref 0–41)
AMPHETAMINE SCREEN, URINE: ABNORMAL
ANION GAP SERPL CALCULATED.3IONS-SCNC: 13 MEQ/L (ref 9–15)
APTT: 27 SEC (ref 24.4–36.8)
AST SERPL-CCNC: 31 U/L (ref 0–40)
BARBITURATE SCREEN URINE: ABNORMAL
BASOPHILS ABSOLUTE: 0 K/UL (ref 0–0.2)
BASOPHILS RELATIVE PERCENT: 0.4 %
BENZODIAZEPINE SCREEN, URINE: ABNORMAL
BILIRUB SERPL-MCNC: 0.5 MG/DL (ref 0.2–0.7)
BILIRUBIN URINE: NEGATIVE
BLOOD, URINE: NORMAL
BUN BLDV-MCNC: 27 MG/DL (ref 8–23)
C-REACTIVE PROTEIN: 1.7 MG/L (ref 0–5)
CALCIUM SERPL-MCNC: 10.7 MG/DL (ref 8.5–9.9)
CANNABINOID SCREEN URINE: ABNORMAL
CHLORIDE BLD-SCNC: 100 MEQ/L (ref 95–107)
CLARITY: CLEAR
CO2: 24 MEQ/L (ref 20–31)
COCAINE METABOLITE SCREEN URINE: ABNORMAL
COLOR: YELLOW
CREAT SERPL-MCNC: 1.72 MG/DL (ref 0.7–1.2)
D DIMER: 0.99 MG/L FEU (ref 0–0.5)
EKG ATRIAL RATE: 85 BPM
EKG P AXIS: 53 DEGREES
EKG P-R INTERVAL: 188 MS
EKG Q-T INTERVAL: 348 MS
EKG QRS DURATION: 106 MS
EKG QTC CALCULATION (BAZETT): 414 MS
EKG R AXIS: -52 DEGREES
EKG T AXIS: 79 DEGREES
EKG VENTRICULAR RATE: 85 BPM
EOSINOPHILS ABSOLUTE: 0.4 K/UL (ref 0–0.7)
EOSINOPHILS RELATIVE PERCENT: 5.7 %
EPITHELIAL CELLS, UA: ABNORMAL /HPF
GFR AFRICAN AMERICAN: 47.9
GFR NON-AFRICAN AMERICAN: 39.6
GLOBULIN: 3.6 G/DL (ref 2.3–3.5)
GLUCOSE BLD-MCNC: 105 MG/DL (ref 60–115)
GLUCOSE BLD-MCNC: 134 MG/DL (ref 60–115)
GLUCOSE BLD-MCNC: 152 MG/DL (ref 60–115)
GLUCOSE BLD-MCNC: 171 MG/DL (ref 70–99)
GLUCOSE URINE: NEGATIVE MG/DL
HBA1C MFR BLD: 8 % (ref 4.8–5.9)
HCT VFR BLD CALC: 41.8 % (ref 42–52)
HEMOGLOBIN: 14 G/DL (ref 14–18)
INR BLD: 1
KETONES, URINE: NEGATIVE MG/DL
LEUKOCYTE ESTERASE, URINE: NEGATIVE
LIPASE: 35 U/L (ref 12–95)
LV EF: 60 %
LVEF MODALITY: NORMAL
LYMPHOCYTES ABSOLUTE: 1.5 K/UL (ref 1–4.8)
LYMPHOCYTES RELATIVE PERCENT: 20.2 %
Lab: ABNORMAL
MCH RBC QN AUTO: 29.5 PG (ref 27–31.3)
MCHC RBC AUTO-ENTMCNC: 33.6 % (ref 33–37)
MCV RBC AUTO: 88 FL (ref 80–100)
MONOCYTES ABSOLUTE: 0.7 K/UL (ref 0.2–0.8)
MONOCYTES RELATIVE PERCENT: 9.8 %
NEUTROPHILS ABSOLUTE: 4.8 K/UL (ref 1.4–6.5)
NEUTROPHILS RELATIVE PERCENT: 63.9 %
NITRITE, URINE: NEGATIVE
OPIATE SCREEN URINE: POSITIVE
PDW BLD-RTO: 15.2 % (ref 11.5–14.5)
PERFORMED ON: ABNORMAL
PERFORMED ON: ABNORMAL
PERFORMED ON: NORMAL
PH UA: 5.5 (ref 5–9)
PHENCYCLIDINE SCREEN URINE: ABNORMAL
PLATELET # BLD: 183 K/UL (ref 130–400)
POTASSIUM REFLEX MAGNESIUM: 4.1 MEQ/L (ref 3.4–4.9)
PROTEIN UA: NEGATIVE MG/DL
PROTHROMBIN TIME: 13 SEC (ref 12.3–14.9)
RBC # BLD: 4.75 M/UL (ref 4.7–6.1)
RBC UA: ABNORMAL /HPF (ref 0–2)
SARS-COV-2, NAAT: NOT DETECTED
SEDIMENTATION RATE, ERYTHROCYTE: 8 MM (ref 0–20)
SODIUM BLD-SCNC: 137 MEQ/L (ref 135–144)
SPECIFIC GRAVITY UA: 1.01 (ref 1–1.03)
TOTAL PROTEIN: 8 G/DL (ref 6.3–8)
TROPONIN: <0.01 NG/ML (ref 0–0.01)
UROBILINOGEN, URINE: 0.2 E.U./DL
WBC # BLD: 7.5 K/UL (ref 4.8–10.8)

## 2021-03-04 PROCEDURE — 85379 FIBRIN DEGRADATION QUANT: CPT

## 2021-03-04 PROCEDURE — 85610 PROTHROMBIN TIME: CPT

## 2021-03-04 PROCEDURE — 83690 ASSAY OF LIPASE: CPT

## 2021-03-04 PROCEDURE — 6370000000 HC RX 637 (ALT 250 FOR IP): Performed by: INTERNAL MEDICINE

## 2021-03-04 PROCEDURE — 6360000002 HC RX W HCPCS: Performed by: INTERNAL MEDICINE

## 2021-03-04 PROCEDURE — 93010 ELECTROCARDIOGRAM REPORT: CPT | Performed by: INTERNAL MEDICINE

## 2021-03-04 PROCEDURE — 6370000000 HC RX 637 (ALT 250 FOR IP): Performed by: EMERGENCY MEDICINE

## 2021-03-04 PROCEDURE — 97165 OT EVAL LOW COMPLEX 30 MIN: CPT

## 2021-03-04 PROCEDURE — 80053 COMPREHEN METABOLIC PANEL: CPT

## 2021-03-04 PROCEDURE — G0378 HOSPITAL OBSERVATION PER HR: HCPCS

## 2021-03-04 PROCEDURE — 93306 TTE W/DOPPLER COMPLETE: CPT

## 2021-03-04 PROCEDURE — 86140 C-REACTIVE PROTEIN: CPT

## 2021-03-04 PROCEDURE — 2580000003 HC RX 258: Performed by: INTERNAL MEDICINE

## 2021-03-04 PROCEDURE — 83036 HEMOGLOBIN GLYCOSYLATED A1C: CPT

## 2021-03-04 PROCEDURE — 84484 ASSAY OF TROPONIN QUANT: CPT

## 2021-03-04 PROCEDURE — 97116 GAIT TRAINING THERAPY: CPT

## 2021-03-04 PROCEDURE — 6360000004 HC RX CONTRAST MEDICATION: Performed by: EMERGENCY MEDICINE

## 2021-03-04 PROCEDURE — 96372 THER/PROPH/DIAG INJ SC/IM: CPT

## 2021-03-04 PROCEDURE — 71275 CT ANGIOGRAPHY CHEST: CPT

## 2021-03-04 PROCEDURE — 96374 THER/PROPH/DIAG INJ IV PUSH: CPT

## 2021-03-04 PROCEDURE — 2580000003 HC RX 258: Performed by: EMERGENCY MEDICINE

## 2021-03-04 PROCEDURE — 85730 THROMBOPLASTIN TIME PARTIAL: CPT

## 2021-03-04 PROCEDURE — 85652 RBC SED RATE AUTOMATED: CPT

## 2021-03-04 PROCEDURE — 93005 ELECTROCARDIOGRAM TRACING: CPT

## 2021-03-04 PROCEDURE — 99284 EMERGENCY DEPT VISIT MOD MDM: CPT

## 2021-03-04 PROCEDURE — 36415 COLL VENOUS BLD VENIPUNCTURE: CPT

## 2021-03-04 PROCEDURE — 6360000002 HC RX W HCPCS: Performed by: EMERGENCY MEDICINE

## 2021-03-04 PROCEDURE — 80306 DRUG TEST PRSMV INSTRMNT: CPT

## 2021-03-04 PROCEDURE — 96375 TX/PRO/DX INJ NEW DRUG ADDON: CPT

## 2021-03-04 PROCEDURE — 97162 PT EVAL MOD COMPLEX 30 MIN: CPT

## 2021-03-04 PROCEDURE — 85025 COMPLETE CBC W/AUTO DIFF WBC: CPT

## 2021-03-04 PROCEDURE — 87635 SARS-COV-2 COVID-19 AMP PRB: CPT

## 2021-03-04 PROCEDURE — 97530 THERAPEUTIC ACTIVITIES: CPT

## 2021-03-04 PROCEDURE — 81001 URINALYSIS AUTO W/SCOPE: CPT

## 2021-03-04 RX ORDER — 0.9 % SODIUM CHLORIDE 0.9 %
1000 INTRAVENOUS SOLUTION INTRAVENOUS ONCE
Status: DISCONTINUED | OUTPATIENT
Start: 2021-03-04 | End: 2021-03-05

## 2021-03-04 RX ORDER — CLONAZEPAM 0.5 MG/1
2 TABLET ORAL NIGHTLY
Status: DISCONTINUED | OUTPATIENT
Start: 2021-03-04 | End: 2021-03-05 | Stop reason: HOSPADM

## 2021-03-04 RX ORDER — DEXTROSE MONOHYDRATE 25 G/50ML
12.5 INJECTION, SOLUTION INTRAVENOUS PRN
Status: DISCONTINUED | OUTPATIENT
Start: 2021-03-04 | End: 2021-03-05 | Stop reason: HOSPADM

## 2021-03-04 RX ORDER — ALLOPURINOL 100 MG/1
300 TABLET ORAL DAILY
Status: DISCONTINUED | OUTPATIENT
Start: 2021-03-04 | End: 2021-03-05 | Stop reason: HOSPADM

## 2021-03-04 RX ORDER — NICOTINE POLACRILEX 4 MG
15 LOZENGE BUCCAL PRN
Status: DISCONTINUED | OUTPATIENT
Start: 2021-03-04 | End: 2021-03-05 | Stop reason: HOSPADM

## 2021-03-04 RX ORDER — ACETAMINOPHEN 325 MG/1
650 TABLET ORAL EVERY 6 HOURS PRN
Status: DISCONTINUED | OUTPATIENT
Start: 2021-03-04 | End: 2021-03-05 | Stop reason: HOSPADM

## 2021-03-04 RX ORDER — BRIMONIDINE TARTRATE 2 MG/ML
1 SOLUTION/ DROPS OPHTHALMIC 2 TIMES DAILY
Status: DISCONTINUED | OUTPATIENT
Start: 2021-03-04 | End: 2021-03-05 | Stop reason: HOSPADM

## 2021-03-04 RX ORDER — GABAPENTIN 300 MG/1
300 CAPSULE ORAL NIGHTLY
Status: DISCONTINUED | OUTPATIENT
Start: 2021-03-04 | End: 2021-03-05 | Stop reason: HOSPADM

## 2021-03-04 RX ORDER — SENNA PLUS 8.6 MG/1
1 TABLET ORAL 2 TIMES DAILY
Status: DISCONTINUED | OUTPATIENT
Start: 2021-03-04 | End: 2021-03-05 | Stop reason: HOSPADM

## 2021-03-04 RX ORDER — ASPIRIN 81 MG/1
81 TABLET ORAL DAILY
Status: DISCONTINUED | OUTPATIENT
Start: 2021-03-05 | End: 2021-03-05 | Stop reason: HOSPADM

## 2021-03-04 RX ORDER — ONDANSETRON 2 MG/ML
4 INJECTION INTRAMUSCULAR; INTRAVENOUS ONCE
Status: COMPLETED | OUTPATIENT
Start: 2021-03-04 | End: 2021-03-04

## 2021-03-04 RX ORDER — ACETAMINOPHEN 650 MG/1
650 SUPPOSITORY RECTAL EVERY 6 HOURS PRN
Status: DISCONTINUED | OUTPATIENT
Start: 2021-03-04 | End: 2021-03-05 | Stop reason: HOSPADM

## 2021-03-04 RX ORDER — PROMETHAZINE HYDROCHLORIDE 12.5 MG/1
12.5 TABLET ORAL EVERY 6 HOURS PRN
Status: DISCONTINUED | OUTPATIENT
Start: 2021-03-04 | End: 2021-03-05 | Stop reason: HOSPADM

## 2021-03-04 RX ORDER — ATORVASTATIN CALCIUM 10 MG/1
10 TABLET, FILM COATED ORAL NIGHTLY
Status: DISCONTINUED | OUTPATIENT
Start: 2021-03-04 | End: 2021-03-05 | Stop reason: HOSPADM

## 2021-03-04 RX ORDER — SODIUM CHLORIDE 0.9 % (FLUSH) 0.9 %
10 SYRINGE (ML) INJECTION EVERY 12 HOURS SCHEDULED
Status: DISCONTINUED | OUTPATIENT
Start: 2021-03-04 | End: 2021-03-05 | Stop reason: HOSPADM

## 2021-03-04 RX ORDER — 0.9 % SODIUM CHLORIDE 0.9 %
1000 INTRAVENOUS SOLUTION INTRAVENOUS ONCE
Status: COMPLETED | OUTPATIENT
Start: 2021-03-04 | End: 2021-03-04

## 2021-03-04 RX ORDER — TRAMADOL HYDROCHLORIDE 50 MG/1
50 TABLET ORAL EVERY 6 HOURS PRN
Status: DISCONTINUED | OUTPATIENT
Start: 2021-03-04 | End: 2021-03-05 | Stop reason: HOSPADM

## 2021-03-04 RX ORDER — IPRATROPIUM BROMIDE AND ALBUTEROL SULFATE 2.5; .5 MG/3ML; MG/3ML
1 SOLUTION RESPIRATORY (INHALATION) EVERY 4 HOURS PRN
Status: DISCONTINUED | OUTPATIENT
Start: 2021-03-04 | End: 2021-03-05 | Stop reason: HOSPADM

## 2021-03-04 RX ORDER — DEXTROSE MONOHYDRATE 50 MG/ML
100 INJECTION, SOLUTION INTRAVENOUS PRN
Status: DISCONTINUED | OUTPATIENT
Start: 2021-03-04 | End: 2021-03-05 | Stop reason: HOSPADM

## 2021-03-04 RX ORDER — MORPHINE SULFATE 4 MG/ML
4 INJECTION, SOLUTION INTRAMUSCULAR; INTRAVENOUS ONCE
Status: COMPLETED | OUTPATIENT
Start: 2021-03-04 | End: 2021-03-04

## 2021-03-04 RX ORDER — ASPIRIN 81 MG/1
324 TABLET, CHEWABLE ORAL ONCE
Status: COMPLETED | OUTPATIENT
Start: 2021-03-04 | End: 2021-03-04

## 2021-03-04 RX ORDER — LEVOTHYROXINE SODIUM 0.1 MG/1
100 TABLET ORAL DAILY
Status: DISCONTINUED | OUTPATIENT
Start: 2021-03-04 | End: 2021-03-05 | Stop reason: HOSPADM

## 2021-03-04 RX ORDER — SODIUM CHLORIDE 0.9 % (FLUSH) 0.9 %
10 SYRINGE (ML) INJECTION PRN
Status: DISCONTINUED | OUTPATIENT
Start: 2021-03-04 | End: 2021-03-05 | Stop reason: HOSPADM

## 2021-03-04 RX ORDER — MORPHINE SULFATE 2 MG/ML
2 INJECTION, SOLUTION INTRAMUSCULAR; INTRAVENOUS EVERY 4 HOURS PRN
Status: DISCONTINUED | OUTPATIENT
Start: 2021-03-04 | End: 2021-03-05 | Stop reason: HOSPADM

## 2021-03-04 RX ORDER — ONDANSETRON 2 MG/ML
4 INJECTION INTRAMUSCULAR; INTRAVENOUS EVERY 6 HOURS PRN
Status: DISCONTINUED | OUTPATIENT
Start: 2021-03-04 | End: 2021-03-05 | Stop reason: HOSPADM

## 2021-03-04 RX ORDER — LISINOPRIL 5 MG/1
5 TABLET ORAL DAILY
Status: DISCONTINUED | OUTPATIENT
Start: 2021-03-04 | End: 2021-03-05 | Stop reason: HOSPADM

## 2021-03-04 RX ORDER — POLYETHYLENE GLYCOL 3350 17 G/17G
17 POWDER, FOR SOLUTION ORAL DAILY PRN
Status: DISCONTINUED | OUTPATIENT
Start: 2021-03-04 | End: 2021-03-05 | Stop reason: HOSPADM

## 2021-03-04 RX ORDER — SODIUM CHLORIDE 9 MG/ML
INJECTION, SOLUTION INTRAVENOUS CONTINUOUS
Status: DISCONTINUED | OUTPATIENT
Start: 2021-03-04 | End: 2021-03-05 | Stop reason: HOSPADM

## 2021-03-04 RX ORDER — POLYETHYLENE GLYCOL 3350 17 G/17G
17 POWDER, FOR SOLUTION ORAL 2 TIMES DAILY
Status: DISCONTINUED | OUTPATIENT
Start: 2021-03-04 | End: 2021-03-05 | Stop reason: HOSPADM

## 2021-03-04 RX ADMIN — TRAMADOL HYDROCHLORIDE 50 MG: 50 TABLET, FILM COATED ORAL at 11:36

## 2021-03-04 RX ADMIN — CLONAZEPAM 2 MG: 0.5 TABLET ORAL at 20:40

## 2021-03-04 RX ADMIN — LISINOPRIL 5 MG: 5 TABLET ORAL at 11:46

## 2021-03-04 RX ADMIN — BRIMONIDINE TARTRATE 1 DROP: 2 SOLUTION/ DROPS OPHTHALMIC at 20:41

## 2021-03-04 RX ADMIN — ASPIRIN 324 MG: 81 TABLET, CHEWABLE ORAL at 07:27

## 2021-03-04 RX ADMIN — POLYETHYLENE GLYCOL 3350 17 G: 17 POWDER, FOR SOLUTION ORAL at 11:46

## 2021-03-04 RX ADMIN — GABAPENTIN 300 MG: 300 CAPSULE ORAL at 20:40

## 2021-03-04 RX ADMIN — SENNOSIDES 8.6 MG: 8.6 TABLET, FILM COATED ORAL at 20:40

## 2021-03-04 RX ADMIN — IOPAMIDOL 100 ML: 755 INJECTION, SOLUTION INTRAVENOUS at 08:05

## 2021-03-04 RX ADMIN — SODIUM CHLORIDE: 9 INJECTION, SOLUTION INTRAVENOUS at 11:38

## 2021-03-04 RX ADMIN — METOPROLOL TARTRATE 25 MG: 25 TABLET, FILM COATED ORAL at 20:40

## 2021-03-04 RX ADMIN — SODIUM CHLORIDE 1000 ML: 9 INJECTION, SOLUTION INTRAVENOUS at 11:05

## 2021-03-04 RX ADMIN — Medication 10 ML: at 11:37

## 2021-03-04 RX ADMIN — ATORVASTATIN CALCIUM 10 MG: 10 TABLET, FILM COATED ORAL at 20:42

## 2021-03-04 RX ADMIN — ONDANSETRON 4 MG: 2 INJECTION INTRAMUSCULAR; INTRAVENOUS at 07:27

## 2021-03-04 RX ADMIN — SODIUM CHLORIDE 1000 ML: 9 INJECTION, SOLUTION INTRAVENOUS at 08:38

## 2021-03-04 RX ADMIN — MORPHINE SULFATE 4 MG: 4 INJECTION, SOLUTION INTRAMUSCULAR; INTRAVENOUS at 07:27

## 2021-03-04 RX ADMIN — POLYETHYLENE GLYCOL 3350 17 G: 17 POWDER, FOR SOLUTION ORAL at 20:40

## 2021-03-04 RX ADMIN — ENOXAPARIN SODIUM 40 MG: 40 INJECTION SUBCUTANEOUS at 11:20

## 2021-03-04 RX ADMIN — METOPROLOL TARTRATE 25 MG: 25 TABLET, FILM COATED ORAL at 11:45

## 2021-03-04 RX ADMIN — SENNOSIDES 8.6 MG: 8.6 TABLET, FILM COATED ORAL at 11:45

## 2021-03-04 ASSESSMENT — PAIN SCALES - GENERAL
PAINLEVEL_OUTOF10: 2
PAINLEVEL_OUTOF10: 3
PAINLEVEL_OUTOF10: 0

## 2021-03-04 ASSESSMENT — PAIN DESCRIPTION - PROGRESSION
CLINICAL_PROGRESSION: NOT CHANGED

## 2021-03-04 ASSESSMENT — PAIN DESCRIPTION - DESCRIPTORS: DESCRIPTORS: ACHING

## 2021-03-04 NOTE — ED NOTES
Dr. Stacia Mccabe accepted admission for patient in OBS status. Room 220 awaiting Covid result at this time.      Wilmer Sigala RN  03/04/21 [id number]

## 2021-03-04 NOTE — FLOWSHEET NOTE
Attempted to call wife Vanessa Garcia to go over home medications of patient. Phone rang busy twice. Will attempt again at later time. Patient is oriented to room and call light. Vitals signs are stable. Patient resting in bed watching tv and does not show any signs of distress. Patient is independent transfer.

## 2021-03-04 NOTE — ED PROVIDER NOTES
Years: 40.00     Types: Cigarettes     Start date: 1968     Quit date: 2000     Years since quittin.7    Smokeless tobacco: Never Used   Substance and Sexual Activity    Alcohol use: No     Alcohol/week: 0.0 standard drinks    Drug use: No    Sexual activity: None   Lifestyle    Physical activity     Days per week: None     Minutes per session: None    Stress: None   Relationships    Social connections     Talks on phone: None     Gets together: None     Attends Buddhism service: None     Active member of club or organization: None     Attends meetings of clubs or organizations: None     Relationship status: None    Intimate partner violence     Fear of current or ex partner: None     Emotionally abused: None     Physically abused: None     Forced sexual activity: None   Other Topics Concern    None   Social History Narrative    None       REVIEW OF SYSTEMS    Constitutional:  Denies fever, chills, weight loss or weakness   Eyes:  Denies photophobia or discharge   HENT:  Denies sore throat or ear pain   Respiratory:  Denies cough but complains of shortness of breath   Cardiovascular: Complains of chest pain, but denies palpitations or swelling   GI:  Denies abdominal pain, nausea, vomiting, or diarrhea   Musculoskeletal:  Denies back pain   Skin:  Denies rash   Neurologic:  Denies headache, focal weakness or sensory changes   Endocrine:  Denies polyuria or polydypsia   Lymphatic:  Denies swollen glands   Psychiatric:  Denies depression, suicidal ideation or homicidal ideation   All systems negative except as marked. PHYSICAL EXAM    VITAL SIGNS: BP (!) 157/96   Pulse 84   Temp 98 °F (36.7 °C) (Oral)   Resp 18   Wt 226 lb (102.5 kg)   SpO2 95%   BMI 26.12 kg/m²    Constitutional:  Well developed, Well nourished, No acute distress, Non-toxic appearance.    HENT:  Normocephalic, Atraumatic, Bilateral external ears normal, Oropharynx moist, No oral exudates, Nose normal. Neck- Normal range of motion, No tenderness, Supple, No stridor. Eyes:  PERRL, EOMI, Conjunctiva normal, No discharge. Respiratory:  Normal breath sounds, No respiratory distress, No wheezing, No chest tenderness. Cardiovascular:  Normal heart rate, Normal rhythm, No murmurs, No rubs, No gallops. GI:  Bowel sounds normal, Soft, No tenderness, No masses, No pulsatile masses. :  No CVA tenderness. Musculoskeletal:  Intact distal pulses, No edema, No tenderness, No cyanosis, No clubbing. Good range of motion in all major joints. No tenderness to palpation or major deformities noted. Back- No tenderness. Integument:  Warm, Dry, No erythema, No rash. Lymphatic:  No lymphadenopathy noted. Neurologic:  Alert & oriented x 3, Normal motor function, Normal sensory function, No focal deficits noted. Psychiatric:  Affect normal, Judgment normal, Mood normal.     EKG    NSR, HR 85 , left Axis, No ST- T wave changes, [id number]      RADIOLOGY    CTA CHEST W WO CONTRAST - r/o Pulmonary Embolism   Final Result      No CT evidence pulmonary embolism. Other findings discussed. All CT scans at this facility use dose modulation, iterative reconstruction, and/or weight based dosing when appropriate to reduce radiation dose to as low as reasonably achievable. REEVALUATION   Patient was updated the results of labs and Radiology. Spoke with the hospitalist accepted patient admission.     Labs  Labs Reviewed   CBC WITH AUTO DIFFERENTIAL - Abnormal; Notable for the following components:       Result Value    Hematocrit 41.8 (*)     RDW 15.2 (*)     All other components within normal limits   COMPREHENSIVE METABOLIC PANEL W/ REFLEX TO MG FOR LOW K - Abnormal; Notable for the following components:    Glucose 171 (*)     BUN 27 (*)     CREATININE 1.72 (*)     GFR Non- 39.6 (*)     GFR  47.9 (*)     Calcium 10.7 (*)     Globulin 3.6 (*)     All other components within normal limits range)   polyethylene glycol (GLYCOLAX) packet 17 g (has no administration in time range)   acetaminophen (TYLENOL) tablet 650 mg (has no administration in time range)     Or   acetaminophen (TYLENOL) suppository 650 mg (has no administration in time range)   aspirin chewable tablet 324 mg (324 mg Oral Given 3/4/21 0727)   morphine injection 4 mg (4 mg Intravenous Given 3/4/21 0727)   ondansetron (ZOFRAN) injection 4 mg (4 mg Intravenous Given 3/4/21 0727)   iopamidol (ISOVUE-370) 76 % injection 100 mL (100 mLs Intravenous Given 3/4/21 0805)   0.9 % sodium chloride bolus (1,000 mLs Intravenous New Bag 3/4/21 0838)       New Prescriptions from this visit:    Current Discharge Medication List          Follow-up:  No follow-up provider specified. Final Impression:   1. Acute renal insufficiency    2.  Other chest pain               (Please note that portions of this note were completed with a voice recognition program.  Efforts were made to edit the dictations but occasionally words are mis-transcribed.)            Jessa Ackerman MD  03/04/21 5121       Jessa Ackerman MD  03/04/21 Bahman White MD  03/04/21 6244

## 2021-03-04 NOTE — ED NOTES
Nursing Adult Assessment    General Appearance  [x] Facial Expressions, extremities, & body posture are relaxed. [] Exceptions:    Cognitive  [x] Alert, make eye contact when prompted. [x] Oriented to person, place, & situation. [] Exceptions:    Respiratory  [x] Unlabored breathing   [x] Speaks in clear and complete sentences   [x] Chest expansion is symmetrical with breaths   [x] Breath sounds are clear bilaterally. [] Exceptions:    Cardiovascular  [x] Regular apical heart sounds   [x] Peripheral pulses are palpable   [x] Capillary refill < 3 seconds in all extremities. [] Exceptions:    Abdomen  [x] Non-tender   [x] Non-distended   [x] Bowel sounds are present. [] Exceptions:    Skin  [x] Color appropriate for ethnicity   [x] No rash or discoloration present at the area(s) of complaint   [x] Warm and dry to touch. [] Exceptions:    Extremities  [x] Non-tender   [x] Normal range of motion   [x] Normal sensation   [x] Normal Appearance, No Edema.   [] Exceptions:      Aguilar Colvin RN  03/04/21 0025

## 2021-03-04 NOTE — PROGRESS NOTES
Physical Therapy    Facility/Department: [facility] MED SURG UNIT  Initial Assessment - OBS evaluation     NAME: Chaz Pitt  : 1950  MRN: [id number]    Date of Service: 3/4/2021    Discharge Recommendations:  Home with assist PRN        Assessment   Body structures, Functions, Activity limitations: Decreased functional mobility ; Decreased endurance;Decreased strength  Assessment: Pt is a 73 yo male who is here Observation due to recent chest pain and renal insufficiency. PT completed evalution and had pt perform balance activities in the hallway for forward/retro walking, heel/toe walking and up/down the steps. Pt did well in all areas with no LOB noted and demonstrated a safe gait pattern in hallway and up/down the steps. Pt reports that he feels like his normal self and that he doesnt feel that he needs PT or OT at this time. PT agrees with pt and doesnt feel that pt needs HHC either and pt agrees as he has help at home if needed. Therefore, PT will not  pt for PT at this time. Treatment Diagnosis: Chest pain, renal insufficiency  Prognosis: Good  Decision Making: Medium Complexity  REQUIRES PT FOLLOW UP: No       Patient Diagnosis(es): The primary encounter diagnosis was Acute renal insufficiency. A diagnosis of Other chest pain was also pertinent to this visit. has a past medical history of COPD (chronic obstructive pulmonary disease) ([address] Diabetes mellitus ([address] Hyperlipidemia, Hypertension, and Lung disease. has a past surgical history that includes Thyroidectomy; Hand surgery (Right); Colonoscopy; pr colon ca scrn not hi rsk ind (N/A, 8/15/2018); and sigmoidoscopy (N/A, 2019).     Restrictions     Vision/Hearing        Subjective  General  Chart Reviewed: Yes  Patient assessed for rehabilitation services?: Yes  Additional Pertinent Hx: Pt here for OBS due to chest pain and renal insufficiency  Family / Caregiver Present: No  Referring Practitioner: Dr Marni Shepherd  Referral Date : 1  Comments: Pt reports that his gait is like his normal gait pattern  Stairs/Curb  Stairs?: Yes  Stairs  # Steps : 5  Stairs Height: 6\"(4\" and 6\")  Rails: Left ascending  Curbs: 6\"  Device: No Device  Assistance: Modified independent ; Independent  Comment: Pt ambulated up/down 5 steps x 2 trials with mod indep/supervision with safe technique on the steps              Plan   Plan  Times per week: No PT at this time    G-Code       OutComes Score                                                  AM-PAC Score             Goals  Short term goals  Time Frame for Short term goals: NO PT NEEDS AT THIS TIME  Patient Goals   Patient goals : \"I don't feel like I need any therapy at this time.  I waiting to go home\"       Therapy Time   Individual Concurrent Group Co-treatment   Time In  10:15am          Time Out  11:15am          Minutes  60 min                  FARZANA Chowdhury#[id number]

## 2021-03-04 NOTE — ED NOTES
Report given to [facility] and patient transferred upstairs with belongings no signs or symptoms of pain or distress noted.      Shanique Hurtado RN  03/04/21 4338

## 2021-03-04 NOTE — ED NOTES
Called and spoke with patient's wife [place] [phone] updated on POC to admit to [facility] and notified her of the visiting hours per the patient's report.      Humble Herrmann RN  03/04/21 1088

## 2021-03-04 NOTE — PROGRESS NOTES
Occupational Therapy   Occupational Therapy Initial Assessment  Date: 3/4/2021   Patient Name: Vanessa Odom  MRN: [id number]     : 1950    Date of Service: 3/4/2021    This OT performed quick assessment of pt whom demo's WFL BUE ROM and strength. Pt demo's I/MI with ADL. Per PT whom evaluated pt this am, pt had no need for OT either and PT had documented I with fxl mob and xfers. Pt with no OT needs at this time, pt in agreement.                                                   Therapy Time   Individual Concurrent Group Co-treatment   Time In  1:05         Time Out  1:20         Minutes  Terri Persaud

## 2021-03-04 NOTE — H&P
Hospital Medicine History & Physical      PCP: Rosario Echavarria MD    Date of Admission: 3/4/2021    Date of Service: 3/4/21      Chief Complaint:  CP/dyspnea      History Of Present Illness:  72 y.o. male who presented to [facility] with above complains. He was spreading mothballs last night since then noted to have some midsternal chest pain and shortness of breath. Chest pain is sharp and worse with inspiration. Patient denies any cough, fever, chills. Denied  pain radiation. Since it lasted all night, came to ER for further investigation. Never had CAD in the past. After initial stabilization in ER was admitted for further treatment     Past Medical History:          Diagnosis Date    [address] (chronic obstructive [address] disease) [address]     Diabetes mellitus ([facility]     Hyperlipidemia     Hypertension     Lung disease        Past Surgical History:          Procedure Laterality Date    COLONOSCOPY      HAND SURGERY Right     WI COLON CA SCRN NOT HI RSK IND N/A 8/15/2018    COLONOSCOPY performed by Jeff Ortega MD at [facility] 5/21/2019    SIGMOIDOSCOPY W/ DILATION performed by Jeff Ortega MD at [address] [place] [address]         Medications Prior to Admission:      Prior to Admission medications    Medication Sig Start Date End Date Taking? Authorizing Provider   gabapentin (NEURONTIN) 300 MG capsule Take 300 mg by mouth nightly.   1/3/16  Yes Historical Provider, MD   levalbuterol Ezella [place] HFA) 45 MCG/ACT inhaler Inhale 2 puffs into the lungs every 4 hours as needed for Wheezing   Yes Historical Provider, MD   brimonidine (ALPHAGAN) 0.2 % ophthalmic solution Place 1 drop into both eyes 2 times daily  1/25/16  Yes Historical Provider, MD   lovastatin (MEVACOR) 40 MG tablet Take 40 mg by mouth daily  1/25/16  Yes Historical Provider, MD   levothyroxine (SYNTHROID) 100 MCG tablet Take 100 mcg by mouth Daily  1/25/16  Yes Historical Provider, MD   clonazePAM (KLONOPIN) 2 MG tablet Take 2 mg by mouth nightly. 12/26/15  Yes Historical Provider, MD   predniSONE (DELTASONE) 10 MG tablet Take 10 mg by mouth daily as needed    Yes Historical Provider, MD   metroNIDAZOLE (FLAGYL) 250 MG tablet Take 1 tablet by mouth 3 times daily for 14 days 3/1/21 3/15/21  Baldwin Cheadle, MD   Respiratory Therapy Supplies SOURAV Change CPAP pressure to 6 cm   New CPAP mask and supplies 10/25/18   Tami Lacey MD   naproxen (NAPROSYN) 500 MG tablet Take 500 mg by mouth 2 times daily as needed     Historical Provider, MD   allopurinol (ZYLOPRIM) 300 MG tablet  1/25/16   Historical Provider, MD   traMADol (ULTRAM) 50 MG tablet Take 50 mg by mouth every 6 hours as needed for Pain. Historical Provider, MD       Allergies:  Patient has no known allergies. Social History:      The patient currently lives home    TOBACCO:   reports that he quit smoking about 20 years ago. His smoking use included cigarettes. He started smoking about 52 years ago. He quit after 40.00 years of use. He has never used smokeless tobacco.  ETOH:   reports no history of alcohol use. Family History:       Reviewed in detail and negative for DM, CAD, Cancer, CVA. Positive as follows:        Problem Relation Age of Onset    Coronary Art Dis Mother     Coronary Art Dis Sister     Colon Cancer Brother     Coronary Art Dis Brother        REVIEW OF SYSTEMS:   Pertinent positives as noted in the HPI. All other systems reviewed and negative. PHYSICAL EXAM:    BP (!) 168/89   Pulse 76   Temp 97.5 °F (36.4 °C) (Oral)   Resp 18   Wt 226 lb (102.5 kg)   SpO2 100%   BMI 26.12 kg/m²     General appearance:  No apparent distress, appears stated age and cooperative. HEENT:  Normal cephalic, atraumatic without obvious deformity. Pupils equal, round, and reactive to light. Extra ocular muscles intact. Conjunctivae/corneas clear. Neck: Supple, with full range of motion. No jugular venous distention.  Trachea midline. Respiratory:  Normal respiratory effort. Clear to auscultation, bilaterally without Rales/Wheezes/Rhonchi. Cardiovascular:  Regular rate and rhythm with normal S1/S2 without murmurs, rubs or gallops. Abdomen: Soft, non-tender, non-distended with normal bowel sounds. Musculoskeletal:  No clubbing, cyanosis or edema bilaterally. Full range of motion without deformity. Skin: Skin color, texture, turgor normal.  No rashes or lesions. Neurologic:  Neurovascularly intact without any focal sensory/motor deficits. Cranial nerves: II-XII intact, grossly non-focal.  Psychiatric:  Alert and oriented, thought content appropriate, normal insight  Capillary Refill: Brisk,< 3 seconds   Peripheral Pulses: +2 palpable, equal bilaterally       Labs:     Recent Labs     03/04/21 0727   WBC 7.5   HGB 14.0   HCT 41.8*        Recent Labs     03/04/21 0727      K 4.1      CO2 24   BUN 27*   CREATININE 1.72*   CALCIUM 10.7*     Recent Labs     03/04/21 0727   AST 31   ALT 15   BILITOT 0.5   ALKPHOS 97     Recent Labs     03/04/21 0727   INR 1.0     Recent Labs     03/04/21 0727   TROPONINI <0.010       Urinalysis:      Lab Results   Component Value Date    NITRU Negative 03/04/2021    RBCUA 3-5 03/04/2021    BLOODU Small 03/04/2021    SPECGRAV 1.015 03/04/2021    GLUCOSEU Negative 03/04/2021           CTA CHEST W WO CONTRAST - r/o Pulmonary Embolism   Final Result      No CT evidence pulmonary embolism. Other findings discussed. All CT scans at this facility use dose modulation, iterative reconstruction, and/or weight based dosing when appropriate to reduce radiation dose to as low as reasonably achievable.              ASSESSMENT:    Active Hospital Problems    Diagnosis Date Noted    Chest pain [R07.9] 03/04/2021       PLAN:        DVT Prophylaxis:   Diet: DIET GENERAL;  Code Status: Full Code    PT/OT Eval Status:     Dispo - CP with atypical presentation, continue work up with troponins/2 d echo, pt on telemonitor. ASA was given. Initiate B blocker/ASA  HTN/tachycardia- initiate B blocker/low dose of lisinopril  History COPD- stable, at his baseline- respiratory Tx as ordered   DM- follow up with ACCU check, checking HBA1C  FER- mild IV hydration, recheck BMP AM  Medically stable for acute admission at [facility] MD [place]    Thank you Geovanni He MD for the opportunity to be involved in this patient's care. If you have any questions or concerns please feel free to contact me.

## 2021-03-04 NOTE — ED TRIAGE NOTES
Patient complains of chest pain and SOB that started last night after spreading moth balls in his basement. He states he has a history of COPD and had throat irritation afterwards. He has also had a headache the last couple days per his report. He states his chest pain is very mild.

## 2021-03-05 VITALS
RESPIRATION RATE: 18 BRPM | HEART RATE: 75 BPM | DIASTOLIC BLOOD PRESSURE: 89 MMHG | OXYGEN SATURATION: 100 % | TEMPERATURE: 97.5 F | HEIGHT: 78 IN | BODY MASS INDEX: 26.44 KG/M2 | SYSTOLIC BLOOD PRESSURE: 111 MMHG | WEIGHT: 228.5 LBS

## 2021-03-05 LAB
ANION GAP SERPL CALCULATED.3IONS-SCNC: 9 MEQ/L (ref 9–15)
BUN BLDV-MCNC: 24 MG/DL (ref 8–23)
CALCIUM SERPL-MCNC: 9.6 MG/DL (ref 8.5–9.9)
CHLORIDE BLD-SCNC: 103 MEQ/L (ref 95–107)
CO2: 24 MEQ/L (ref 20–31)
CREAT SERPL-MCNC: 1.64 MG/DL (ref 0.7–1.2)
GFR AFRICAN AMERICAN: 50.6
GFR NON-AFRICAN AMERICAN: 41.8
GLUCOSE BLD-MCNC: 132 MG/DL (ref 60–115)
GLUCOSE BLD-MCNC: 137 MG/DL (ref 60–115)
GLUCOSE BLD-MCNC: 194 MG/DL (ref 70–99)
HCT VFR BLD CALC: 36.2 % (ref 42–52)
HEMOGLOBIN: 11.9 G/DL (ref 14–18)
MCH RBC QN AUTO: 29.2 PG (ref 27–31.3)
MCHC RBC AUTO-ENTMCNC: 32.9 % (ref 33–37)
MCV RBC AUTO: 88.6 FL (ref 80–100)
PDW BLD-RTO: 15.9 % (ref 11.5–14.5)
PERFORMED ON: ABNORMAL
PERFORMED ON: ABNORMAL
PLATELET # BLD: 155 K/UL (ref 130–400)
POTASSIUM REFLEX MAGNESIUM: 4.1 MEQ/L (ref 3.4–4.9)
PRO-BNP: 63 PG/ML
RBC # BLD: 4.08 M/UL (ref 4.7–6.1)
SODIUM BLD-SCNC: 136 MEQ/L (ref 135–144)
WBC # BLD: 4.8 K/UL (ref 4.8–10.8)

## 2021-03-05 PROCEDURE — 6370000000 HC RX 637 (ALT 250 FOR IP): Performed by: INTERNAL MEDICINE

## 2021-03-05 PROCEDURE — 2580000003 HC RX 258: Performed by: INTERNAL MEDICINE

## 2021-03-05 PROCEDURE — G0378 HOSPITAL OBSERVATION PER HR: HCPCS

## 2021-03-05 PROCEDURE — 85027 COMPLETE CBC AUTOMATED: CPT

## 2021-03-05 PROCEDURE — 83880 ASSAY OF NATRIURETIC PEPTIDE: CPT

## 2021-03-05 PROCEDURE — 80048 BASIC METABOLIC PNL TOTAL CA: CPT

## 2021-03-05 PROCEDURE — 36415 COLL VENOUS BLD VENIPUNCTURE: CPT

## 2021-03-05 PROCEDURE — 96372 THER/PROPH/DIAG INJ SC/IM: CPT

## 2021-03-05 PROCEDURE — 6360000002 HC RX W HCPCS: Performed by: INTERNAL MEDICINE

## 2021-03-05 RX ORDER — GLYBURIDE 5 MG/1
5 TABLET ORAL
Qty: 30 TABLET | Refills: 3 | Status: SHIPPED | OUTPATIENT
Start: 2021-03-05 | End: 2021-09-23

## 2021-03-05 RX ORDER — GLYBURIDE 2.5 MG/1
5 TABLET ORAL
Status: DISCONTINUED | OUTPATIENT
Start: 2021-03-05 | End: 2021-03-05 | Stop reason: HOSPADM

## 2021-03-05 RX ORDER — ASPIRIN 81 MG/1
81 TABLET ORAL DAILY
Qty: 30 TABLET | Refills: 3 | Status: ON HOLD | OUTPATIENT
Start: 2021-03-05 | End: 2021-09-26 | Stop reason: HOSPADM

## 2021-03-05 RX ADMIN — METOPROLOL TARTRATE 25 MG: 25 TABLET, FILM COATED ORAL at 09:41

## 2021-03-05 RX ADMIN — LEVOTHYROXINE SODIUM 100 MCG: 100 TABLET ORAL at 06:08

## 2021-03-05 RX ADMIN — ASPIRIN 81 MG: 81 TABLET, COATED ORAL at 09:41

## 2021-03-05 RX ADMIN — BRIMONIDINE TARTRATE 1 DROP: 2 SOLUTION/ DROPS OPHTHALMIC at 09:41

## 2021-03-05 RX ADMIN — POLYETHYLENE GLYCOL 3350 17 G: 17 POWDER, FOR SOLUTION ORAL at 09:40

## 2021-03-05 RX ADMIN — ALLOPURINOL 300 MG: 100 TABLET ORAL at 09:40

## 2021-03-05 RX ADMIN — SENNOSIDES 8.6 MG: 8.6 TABLET, FILM COATED ORAL at 09:41

## 2021-03-05 RX ADMIN — GLYBURIDE 5 MG: 2.5 TABLET ORAL at 09:40

## 2021-03-05 RX ADMIN — LISINOPRIL 5 MG: 5 TABLET ORAL at 09:41

## 2021-03-05 RX ADMIN — SODIUM CHLORIDE: 9 INJECTION, SOLUTION INTRAVENOUS at 06:08

## 2021-03-05 RX ADMIN — Medication 10 ML: at 09:41

## 2021-03-05 RX ADMIN — ENOXAPARIN SODIUM 40 MG: 40 INJECTION SUBCUTANEOUS at 09:40

## 2021-03-05 NOTE — PROGRESS NOTES
Discharge instructions reviewed and understanding verbalized. Patient discharged from unit accompanied by nursing staff.

## 2021-03-05 NOTE — DISCHARGE SUMMARY
Discharge Summary    Patient:  Lazara Yu  YOB: 1950    MRN: [id number]   Acct: [de-identified]    Primary Care Physician: Tiffanie Moss MD    Admit date:  3/4/2021    Discharge date:   03/05/21      Discharge Diagnoses:   <principal problem not specified>  Active Problems:    Chest pain  Resolved Problems:    * No resolved hospital problems. *      Admitted for: [facility] Course: patient was admitted with dyspnea/CP. troponins were negative, no arrhythmia were observed, 2 d echo performed. ASA/b blockers were initiated- pt will follow up with cardiology after DC for further recommendations. Also was found to have elevated BS/HBA1C- initiated low dose of glyburide.  DC home in stable condition     Consultants:      Discharge Medications:       Medication List      START taking these medications    aspirin 81 MG EC tablet  Take 1 tablet by mouth daily     glyBURIDE 5 MG tablet  Commonly known as: DIABETA  Take 1 tablet by mouth daily (with breakfast)     metoprolol tartrate 25 MG tablet  Commonly known as: LOPRESSOR  Take 1 tablet by mouth 2 times daily        CONTINUE taking these medications    allopurinol 300 MG tablet  Commonly known as: ZYLOPRIM     brimonidine 0.2 % ophthalmic solution  Commonly known as: ALPHAGAN     clonazePAM 2 MG tablet  Commonly known as: KLONOPIN     gabapentin 300 MG capsule  Commonly known as: NEURONTIN     levalbuterol 45 MCG/ACT inhaler  Commonly known as: XOPENEX HFA     levothyroxine 100 MCG tablet  Commonly known as: SYNTHROID     lovastatin 40 MG tablet  Commonly known as: MEVACOR     naproxen 500 MG tablet  Commonly known as: NAPROSYN     predniSONE 10 MG tablet  Commonly known as: DELTASONE     Respiratory Therapy Supplies Marizol  Change CPAP pressure to 6 cm   New CPAP mask and supplies     traMADol 50 MG tablet  Commonly known as: ULTRAM        STOP taking these medications    metroNIDAZOLE 250 MG tablet  Commonly known as: FLAGYL           Where to Get Your Medications      These medications were sent to [address] [place] [address],  Kinga Bergeron  [address] [place]    Phone: [phone]   · aspirin 81 MG EC tablet  · glyBURIDE 5 MG tablet  · metoprolol tartrate 25 MG tablet         Physical Exam:    Vitals:  Vitals:    03/04/21 1011 03/04/21 1145 03/04/21 2053 03/05/21 0615   BP: (!) 168/89  (!) 117/53 111/89   Pulse: 76  84 75   Resp:   18    Temp:       TempSrc:       SpO2:   97% 100%   Weight:  228 lb 8 oz (103.6 kg)     Height:  6' 6\" (1.981 m)       Weight: Weight: 228 lb 8 oz (103.6 kg)     24 hour intake/output:    Intake/Output Summary (Last 24 hours) at 3/5/2021 6662  Last data filed at 3/4/2021 1448  Gross per 24 hour   Intake 360 ml   Output --   Net 360 ml       General appearance - alert, well appearing, and in no distress  Chest - clear to auscultation, no wheezes, rales or rhonchi, symmetric air entry  Heart - normal rate, regular rhythm, normal S1, S2, no murmurs, rubs, clicks or gallops  Abdomen - soft, nontender, nondistended, no masses or organomegaly  Obese: No; Protuberant: No   Neurological - alert, oriented, normal speech, no focal findings or movement disorder noted  Extremities - peripheral pulses normal, no pedal edema, no clubbing or cyanosis  Skin - normal coloration and turgor, no rashes, no suspicious skin lesions noted      Radiology reports as per the Radiologist  Radiology: Echo Complete 2d W Doppler W Color    Result Date: 3/4/2021  Transthoracic Echocardiography Report (TTE)  Demographics  Patient Name    Sabra Nolasco        Gender               Male                  [facility]  Patient Number  [id number]         Race                 Black                                 Ethnicity  Visit Number    [id number]      Room Number          0483  Corporate ID                   Date of Study        03/04/2021  Accession       [id number]     Referring Physician  Number  Date of Birth   1950     Sonographer          Marijaisi Vazquez [facility]  Age             72 year(s)     Interpreting         [facility])                                 Physician            Cardiology                                                      Sebastian Schroeder Procedure Type of Study  TTE procedure:ECHO COMPLETE 2D W/DOP W/COLOR. Procedure Date Date: 03/04/2021 Start: 05:25 PM Study Location: [facility] Technical Quality: Adequate visualization Indications:Chest pain. Patient Status: Routine Height: 78 inches Weight: 228 pounds BSA: 2.39 m^2 BMI: 26.35 kg/m^2 BP: 168/89 mmHg  Conclusions  Summary  Moderately dilated left atrium. Left ventricular ejection fraction is visually estimated at 60%. Impaired relaxation compatible with diastolic dysfunction. ( reversed E/A  ratio)  Mild concentric left ventricular hypertrophy. Normal right ventricle structure and function. Normal right ventricle systolic pressure. Signature  ----------------------------------------------------------------  Electronically signed by Avni Crocker(Interpreting physician)  on 03/04/2021 06:01 PM  ----------------------------------------------------------------  Findings Left Ventricle Left ventricular ejection fraction is visually estimated at 60%. Impaired relaxation compatible with diastolic dysfunction. ( reversed E/A ratio) Mild concentric left ventricular hypertrophy. Right Ventricle Normal right ventricle structure and function. Normal right ventricle systolic pressure. Left Atrium Moderately dilated left atrium. Right Atrium Normal right atrium. Mitral Valve Normal mitral valve structure and function. Tricuspid Valve Normal tricuspid valve structure and function. Aortic Valve Normal aortic valve structure and function. Pulmonic Valve Normal pulmonic valve structure and function. Pericardial Effusion No evidence of pericardial effusion. Pleural Effusion No evidence of pleural effusion.  Aorta \ Miscellaneous Miscellaneous normal findings were found. M-Mode Measurements (cm)  LVIDd: 4.56 cm                         LVIDs: 3.41 cm  IVSd: 1.13 cm                          IVSs: 1.2 cm  LVPWd: 1.23 cm                         LVPWs: 1.41 cm  Rt. Vent.  Dimension: 2.69 cm           AO Root Dimension: 3.58 cm                                         ACS: 1.7 cm                                         LA: 4.67 cm                                         LVOT: 2.01 cm Doppler Measurements:  AV Velocity:0.03 m/s                  MV Peak E-Wave: 0.72 m/s  AV Peak Gradient: 3.24 mmHg           MV Peak A-Wave: 0.96 m/s  AV Mean Gradient: 1.88 mmHg  AV Area (Continuity):3 cm^2  TR Velocity:1.83 m/s                  Estimated RAP:3 mmHg  TR Gradient:13.44 mmHg                RVSP:16.44 mmHg Valves  Mitral Valve  Peak E-Wave: 0.72 m/s                Peak A-Wave: 0.96 m/s                                       E/A Ratio: 0.75                                       Peak Gradient: 2.07 mmHg                                       Deceleration Time: 233.3 msec  Tissue Doppler  E' Septal Velocity: 0.08 m/s  E' Lateral Velocity: 0.1 m/s  Aortic Valve  Peak Velocity: 0.9 m/s              Mean Velocity: 0.64 m/s  Peak Gradient: 3.24 mmHg            Mean Gradient: 1.88 mmHg  Area (continuity): 3 cm^2           Area (2D): 3 cm^2  AV VTI: 19.12 cm  Cusp Separation: 1.7 cm  Tricuspid Valve  Estimated RVSP: 16.44 mmHg              Estimated RAP: 3 mmHg  TR Velocity: 1.83 m/s                   TR Gradient: 13.44 mmHg  Pulmonic Valve  Peak Velocity: 1.02 m/s           Peak Gradient: 4.13 mmHg                                    Estimated PASP: 16.44 mmHg  LVOT  Peak Velocity: 0.92 m/s              Mean Velocity: 0.55 m/s  Peak Gradient: 3.39 mmHg             Mean Gradient: 1.52 mmHg  LVOT Diameter: 2.01 cm               LVOT VTI: 18.07 cm Structures  Left Atrium  LA Dimension: 4.67 cm  LA/Aorta: 1.3  LA Volume/Index: 54.29 ml /23 m^2  Left Ventricle  Diastolic Dimension: 7.94 cm Systolic Dimension: 6.27 cm  Septum Diastolic: 7.73 cm            Septum Systolic: 1.2 cm  PW Diastolic: 9.76 cm                PW Systolic: 8.51 cm                                       FS: 25.2 %  LV EDV/LV EDV Index: 95.53 ml/40 m^2 LV ESV/LV ESV Index: 47.78 ml/20 m^2  EF Calculated: 50 %                  LV Length: 6.83 cm  LVOT Diameter: 2.01 cm  Right Atrium  RA Systolic Pressure: 3 mmHg  Right Ventricle  Diastolic Dimension: 3.00 cm                                    RV Systolic Pressure: 42.82 mmHg Aorta/ Miscellaneous Aorta  Aortic Root: 3.58 cm  LVOT Diameter: 2.01 cm    Cta Chest W Wo Contrast - R/o Pulmonary Embolism    Result Date: 3/4/2021  CT PULMONARY ANGIOGRAM WITH INTRAVENOUS CONTRAST MEDIUM. REASON FOR EXAMINATION:  CHEST PAIN, ELEVATED D-DIMER TECHNIQUE: Helical CTA was performed through the chest utilizing 100 ml of Isovue-370 intravenous contrast.  Images were obtained with bolus tracking in order to opacify the pulmonary arteries. Thick section coronal MIP 3D reconstructions were performed  on a separate workstation. COMPARISON: CT chest, August 9, 2016 FINDINGS:  Pulmonary arteries: No intraluminal filling defects. Thoracic aorta: Normal in course and caliber. Cardiac Size: Normal. Pericardial effusion: None. Right lung: No nodules, masses, pleural effusion, pneumothorax. Subsegmental consolidation, right lower lobe, unchanged. Left lung: No nodules, masses, consolidation, pleural effusion, pneumothorax. Lymph nodes: No hilar, mediastinal, or axillary lymph node enlargement. Upper abdomen:Limited imaging upper abdomen shows punctate calcification within left hepatic lobe as well as within spleen. Bilateral simple cysts identified in upper poles kidneys. Partial fatty replacement of pancreas. Elevation right diaphragm unchanged. Musculoskeletal:No osteoblastic, and no osteolytic lesions. No CT evidence pulmonary embolism. Other findings discussed.  All CT scans at this facility use dose modulation, iterative reconstruction, and/or weight based dosing when appropriate to reduce radiation dose to as low as reasonably achievable.         Results for orders placed or performed during the hospital encounter of 03/04/21   COVID-19, Rapid   Result Value Ref Range    SARS-CoV-2, NAAT Not Detected Not Detected   CBC Auto Differential   Result Value Ref Range    WBC 7.5 4.8 - 10.8 K/uL    RBC 4.75 4.70 - 6.10 M/uL    Hemoglobin 14.0 14.0 - 18.0 g/dL    Hematocrit 41.8 (L) 42.0 - 52.0 %    MCV 88.0 80.0 - 100.0 fL    MCH 29.5 27.0 - 31.3 pg    MCHC 33.6 33.0 - 37.0 %    RDW 15.2 (H) 11.5 - 14.5 %    Platelets 483 706 - 987 K/uL    Neutrophils % 63.9 %    Lymphocytes % 20.2 %    Monocytes % 9.8 %    Eosinophils % 5.7 %    Basophils % 0.4 %    Neutrophils Absolute 4.8 1.4 - 6.5 K/uL    Lymphocytes Absolute 1.5 1.0 - 4.8 K/uL    Monocytes Absolute 0.7 0.2 - 0.8 K/uL    Eosinophils Absolute 0.4 0.0 - 0.7 K/uL    Basophils Absolute 0.0 0.0 - 0.2 K/uL   Comprehensive Metabolic Panel w/ Reflex to MG   Result Value Ref Range    Sodium 137 135 - 144 mEq/L    Potassium reflex Magnesium 4.1 3.4 - 4.9 mEq/L    Chloride 100 95 - 107 mEq/L    CO2 24 20 - 31 mEq/L    Anion Gap 13 9 - 15 mEq/L    Glucose 171 (H) 70 - 99 mg/dL    BUN 27 (H) 8 - 23 mg/dL    CREATININE 1.72 (H) 0.70 - 1.20 mg/dL    GFR Non-African American 39.6 (L) >60    GFR  47.9 (L) >60    Calcium 10.7 (H) 8.5 - 9.9 mg/dL    Total Protein 8.0 6.3 - 8.0 g/dL    Albumin 4.4 3.5 - 4.6 g/dL    Total Bilirubin 0.5 0.2 - 0.7 mg/dL    Alkaline Phosphatase 97 35 - 104 U/L    ALT 15 0 - 41 U/L    AST 31 0 - 40 U/L    Globulin 3.6 (H) 2.3 - 3.5 g/dL   Lipase   Result Value Ref Range    Lipase 35 12 - 95 U/L   Troponin   Result Value Ref Range    Troponin <0.010 0.000 - 0.010 ng/mL   Protime-INR   Result Value Ref Range    Protime 13.0 12.3 - 14.9 sec    INR 1.0    APTT   Result Value Ref Range    aPTT 27.0 24.4 - 36.8 sec   Urinalysis, reflex to microscopic   Result Value Ref Range    Color, UA Yellow Straw/Yellow    Clarity, UA Clear Clear    Glucose, Ur Negative Negative mg/dL    Bilirubin Urine Negative Negative    Ketones, Urine Negative Negative mg/dL    Specific Gravity, UA 1.015 1.005 - 1.030    Blood, Urine Small Negative    pH, UA 5.5 5.0 - 9.0    Protein, UA Negative Negative mg/dL    Urobilinogen, Urine 0.2 <2.0 E.U./dL    Nitrite, Urine Negative Negative    Leukocyte Esterase, Urine Negative Negative   D-Dimer, Quantitative   Result Value Ref Range    D-Dimer, Quant 0.99 (HH) 0.00 - 0.50 mg/L FEU   Microscopic Urinalysis   Result Value Ref Range    RBC, UA 3-5 (A) 0 - 2 /HPF    Epithelial Cells, UA 0-2 /HPF   Urine Drug Screen, Comprehensive   Result Value Ref Range    PCP Screen, Urine Neg Negative <25 ng/mL    Benzodiazepine Screen, Urine Neg Negative <150 ng/mL    Cocaine Metabolite Screen, Urine Neg Negative <150 ng/mL    Amphetamine Screen, Urine Neg Negative <500 ng/mL    Cannabinoid Scrn, Ur Neg Negative <50 ng/mL    Opiate Scrn, Ur POSITIVE (A) Negative <100 ng/mL    Barbiturate Screen, Ur Neg Negative <200 ng/mL    Drug Screen Comment: see below    Sedimentation Rate   Result Value Ref Range    Sed Rate 8 0 - 20 mm   C-Reactive Protein   Result Value Ref Range    CRP 1.7 0.0 - 5.0 mg/L   Troponin   Result Value Ref Range    Troponin <0.010 0.000 - 0.010 ng/mL   Troponin   Result Value Ref Range    Troponin <0.010 0.000 - 0.010 ng/mL   Hemoglobin A1C   Result Value Ref Range    Hemoglobin A1C 8.0 (H) 4.8 - 5.9 %   CBC   Result Value Ref Range    WBC 4.8 4.8 - 10.8 K/uL    RBC 4.08 (L) 4.70 - 6.10 M/uL    Hemoglobin 11.9 (L) 14.0 - 18.0 g/dL    Hematocrit 36.2 (L) 42.0 - 52.0 %    MCV 88.6 80.0 - 100.0 fL    MCH 29.2 27.0 - 31.3 pg    MCHC 32.9 (L) 33.0 - 37.0 %    RDW 15.9 (H) 11.5 - 14.5 %    Platelets 690 536 - 567 K/uL   Basic Metabolic Panel w/ Reflex to MG   Result Value Ref Range    Sodium 136 135 - 144 mEq/L    Potassium reflex Magnesium 4.1 3.4 - 4.9 mEq/L    Chloride 103 95 - 107 mEq/L    CO2 24 20 - 31 mEq/L    Anion Gap 9 9 - 15 mEq/L    Glucose 194 (H) 70 - 99 mg/dL    BUN 24 (H) 8 - 23 mg/dL    CREATININE 1.64 (H) 0.70 - 1.20 mg/dL    GFR Non-African American 41.8 (L) >60    GFR  50.6 (L) >60    Calcium 9.6 8.5 - 9.9 mg/dL   Brain Natriuretic Peptide   Result Value Ref Range    Pro-BNP 63 pg/mL   POCT Glucose   Result Value Ref Range    POC Glucose 134 (H) 60 - 115 mg/dl    Performed on ACCU-CHEK    POCT Glucose   Result Value Ref Range    POC Glucose 105 60 - 115 mg/dl    Performed on ACCU-CHEK    POCT Glucose   Result Value Ref Range    POC Glucose 152 (H) 60 - 115 mg/dl    Performed on ACCU-CHEK    POCT Glucose   Result Value Ref Range    POC Glucose 132 (H) 60 - 115 mg/dl    Performed on ACCU-CHEK    EKG 12 Lead   Result Value Ref Range    Ventricular Rate 85 BPM    Atrial Rate 85 BPM    P-R Interval 188 ms    QRS Duration 106 ms    Q-T Interval 348 ms    QTc Calculation (Bazett) 414 ms    P Axis 53 degrees    R Axis -52 degrees    T Axis 79 degrees       Diet:  DIET GENERAL;     Activity:  Activity as tolerated (Patient may move about with assist as indicated or with supervision.)    Follow-up:  in 1 weeks with Marianna Jones MD,      Disposition: home    Condition: Stable    Time Spent: 50 minutes    Electronically signed by Isabel Sanchez MD on 3/5/2021 at 8:57 AM    Discharging Hospitalist

## 2021-03-05 NOTE — PROGRESS NOTES
Patient reports not having any assisted devices/DME needs at this time. Patient reports being able to afford cost of medication and food. Patient identifies no help at home needs at this time. SS to continue to follow as needed while patient is at [facility].      Electronically signed by JAMES Rincon on 3/5/2021 at 11:28 AM

## 2021-03-05 NOTE — PROGRESS NOTES
Pt is A&O x 4. Pleasant and cooperative with staff and care. Able to use call light to make needs known. Denies pain and shows no s/s of distress or discomfort. No further complaints voiced. Call light within reach. Safety maintained.

## 2021-04-26 ENCOUNTER — OFFICE VISIT (OUTPATIENT)
Dept: NEUROLOGY | Age: 71
End: 2021-04-26
Payer: MEDICARE

## 2021-04-26 VITALS
SYSTOLIC BLOOD PRESSURE: 142 MMHG | WEIGHT: 230.6 LBS | BODY MASS INDEX: 26.65 KG/M2 | HEART RATE: 80 BPM | OXYGEN SATURATION: 98 % | DIASTOLIC BLOOD PRESSURE: 80 MMHG

## 2021-04-26 DIAGNOSIS — A86 VIRAL ENCEPHALITIS: Primary | ICD-10-CM

## 2021-04-26 DIAGNOSIS — G47.52 REM BEHAVIORAL DISORDER: ICD-10-CM

## 2021-04-26 DIAGNOSIS — G47.33 OSA (OBSTRUCTIVE SLEEP APNEA): ICD-10-CM

## 2021-04-26 DIAGNOSIS — F09 MILD COGNITIVE DISORDER: ICD-10-CM

## 2021-04-26 PROCEDURE — 99215 OFFICE O/P EST HI 40 MIN: CPT | Performed by: PSYCHIATRY & NEUROLOGY

## 2021-04-26 RX ORDER — DONEPEZIL HYDROCHLORIDE 5 MG/1
5 TABLET, FILM COATED ORAL NIGHTLY
Qty: 30 TABLET | Refills: 3 | Status: ON HOLD | OUTPATIENT
Start: 2021-04-26 | End: 2021-09-26 | Stop reason: SDUPTHER

## 2021-04-26 ASSESSMENT — ENCOUNTER SYMPTOMS
NAUSEA: 0
BACK PAIN: 0
TROUBLE SWALLOWING: 0
SHORTNESS OF BREATH: 0
COLOR CHANGE: 0
VOMITING: 0
CHOKING: 0
PHOTOPHOBIA: 0

## 2021-04-26 NOTE — PROGRESS NOTES
Highest education level: Not on file   Occupational History    Not on file   Social Needs    Financial resource strain: Not on file    Food insecurity     Worry: Not on file     Inability: Not on file    Transportation needs     Medical: Not on file     Non-medical: Not on file   Tobacco Use    Smoking status: Former Smoker     Years: 40.00     Types: Cigarettes     Start date: 1968     Quit date: 2000     Years since quittin.8    Smokeless tobacco: Never Used   Substance and Sexual Activity    Alcohol use: No     Alcohol/week: 0.0 standard drinks    Drug use: No    Sexual activity: Not on file   Lifestyle    Physical activity     Days per week: Not on file     Minutes per session: Not on file    Stress: Not on file   Relationships    Social connections     Talks on phone: Not on file     Gets together: Not on file     Attends Taoist service: Not on file     Active member of club or organization: Not on file     Attends meetings of clubs or organizations: Not on file     Relationship status: Not on file    Intimate partner violence     Fear of current or ex partner: Not on file     Emotionally abused: Not on file     Physically abused: Not on file     Forced sexual activity: Not on file   Other Topics Concern    Not on file   Social History Narrative    Not on file     Family History   Problem Relation Age of Onset    Coronary Art Dis Mother     Coronary Art Dis Sister     Colon Cancer Brother     Coronary Art Dis Brother      No Known Allergies    Current Outpatient Medications   Medication Sig Dispense Refill    aspirin 81 MG EC tablet Take 1 tablet by mouth daily 30 tablet 3    metoprolol tartrate (LOPRESSOR) 25 MG tablet Take 1 tablet by mouth 2 times daily 60 tablet 3    Respiratory Therapy Supplies SOURAV Change CPAP pressure to 6 cm   New CPAP mask and supplies 1 Device 0    gabapentin (NEURONTIN) 300 MG capsule Take 300 mg by mouth nightly.        levalbuterol Jordi Ann HFA) 45 MCG/ACT inhaler Inhale 2 puffs into the lungs every 4 hours as needed for Wheezing      naproxen (NAPROSYN) 500 MG tablet Take 500 mg by mouth 2 times daily as needed       brimonidine (ALPHAGAN) 0.2 % ophthalmic solution Place 1 drop into both eyes 2 times daily       lovastatin (MEVACOR) 40 MG tablet Take 40 mg by mouth daily       levothyroxine (SYNTHROID) 100 MCG tablet Take 100 mcg by mouth Daily       clonazePAM (KLONOPIN) 2 MG tablet Take 2 mg by mouth nightly.  allopurinol (ZYLOPRIM) 300 MG tablet       glyBURIDE (DIABETA) 5 MG tablet Take 1 tablet by mouth daily (with breakfast) (Patient not taking: Reported on 4/26/2021) 30 tablet 3    traMADol (ULTRAM) 50 MG tablet Take 50 mg by mouth every 6 hours as needed for Pain.  predniSONE (DELTASONE) 10 MG tablet Take 10 mg by mouth daily as needed        No current facility-administered medications for this visit. Review of Systems   Constitutional: Negative for fever. HENT: Negative for ear pain, tinnitus and trouble swallowing. Eyes: Negative for photophobia and visual disturbance. Respiratory: Negative for choking and shortness of breath. Cardiovascular: Negative for chest pain and palpitations. Gastrointestinal: Negative for nausea and vomiting. Musculoskeletal: Negative for back pain, gait problem, joint swelling, myalgias, neck pain and neck stiffness. Skin: Negative for color change. Allergic/Immunologic: Negative for food allergies. Neurological: Negative for dizziness, tremors, seizures, syncope, facial asymmetry, speech difficulty, weakness, light-headedness, numbness and headaches. Psychiatric/Behavioral: Negative for behavioral problems, confusion, hallucinations and sleep disturbance.        Objective:   BP (!) 142/80 (Site: Left Upper Arm, Position: Sitting, Cuff Size: Medium Adult)   Pulse 80   Wt 230 lb 9.6 oz (104.6 kg)   SpO2 98%   BMI 26.65 kg/m²     Physical Exam  Vitals signs reviewed. Eyes:      Pupils: Pupils are equal, round, and reactive to light. Neck:      Musculoskeletal: Normal range of motion. Cardiovascular:      Rate and Rhythm: Normal rate and regular rhythm. Heart sounds: No murmur. Pulmonary:      Effort: Pulmonary effort is normal.      Breath sounds: Normal breath sounds. Abdominal:      General: Bowel sounds are normal.   Musculoskeletal: Normal range of motion. Skin:     General: Skin is warm. Neurological:      Mental Status: He is alert and oriented to person, place, and time. Cranial Nerves: No cranial nerve deficit. Sensory: No sensory deficit. Motor: No abnormal muscle tone. Coordination: Coordination normal.      Deep Tendon Reflexes: Reflexes are normal and symmetric. Babinski sign absent on the right side. Babinski sign absent on the left side. Psychiatric:         Mood and Affect: Mood normal.         Echo Complete 2d W Doppler W Color    Result Date: 3/4/2021  Transthoracic Echocardiography Report (TTE)  Demographics  Patient Name    Karen Pereira        Gender               Male                  [facility]  Patient Number  [id number]         Race                 Black                                 Ethnicity  Visit Number    [id number]      Room Number          7268  Corporate ID                   Date of Study        03/04/2021  Accession       [id number]     Referring Physician  Number  Date of Birth   1950     Sonographer          Portillo FOX  Age             72 year(s)     Interpreting         [facility])                                 Physician            Cardiology                                                      [address] [place] [address] Procedure Type of Study  TTE procedure:ECHO COMPLETE 2D W/DOP W/COLOR. Procedure Date Date: 03/04/2021 Start: 05:25 PM Study Location: [facility] Technical Quality: Adequate visualization Indications:Chest pain.  Patient Status: Routine Height: 78 inches Weight: 228 pounds BSA: 2.39 m^2 BMI: 26.35 kg/m^2 BP: 168/89 mmHg  Conclusions  Summary  Moderately dilated left atrium. Left ventricular ejection fraction is visually estimated at 60%. Impaired relaxation compatible with diastolic dysfunction. ( reversed E/A  ratio)  Mild concentric left ventricular hypertrophy. Normal right ventricle structure and function. Normal right ventricle systolic pressure. Signature  ----------------------------------------------------------------  Electronically signed by Lissy Crocker(Interpreting physician)  on 03/04/2021 06:01 PM  ----------------------------------------------------------------  Findings Left Ventricle Left ventricular ejection fraction is visually estimated at 60%. Impaired relaxation compatible with diastolic dysfunction. ( reversed E/A ratio) Mild concentric left ventricular hypertrophy. Right Ventricle Normal right ventricle structure and function. Normal right ventricle systolic pressure. Left Atrium Moderately dilated left atrium. Right Atrium Normal right atrium. Mitral Valve Normal mitral valve structure and function. Tricuspid Valve Normal tricuspid valve structure and function. Aortic Valve Normal aortic valve structure and function. Pulmonic Valve Normal pulmonic valve structure and function. Pericardial Effusion No evidence of pericardial effusion. Pleural Effusion No evidence of pleural effusion. Aorta \ Miscellaneous Miscellaneous normal findings were found. M-Mode Measurements (cm)  LVIDd: 4.56 cm                         LVIDs: 3.41 cm  IVSd: 1.13 cm                          IVSs: 1.2 cm  LVPWd: 1.23 cm                         LVPWs: 1.41 cm  Rt. Vent.  Dimension: 2.69 cm           AO Root Dimension: 3.58 cm                                         ACS: 1.7 cm                                         LA: 4.67 cm                                         LVOT: 2.01 cm Doppler Measurements:  AV Velocity:0.03 m/s                  MV Peak E-Wave: 0.72 m/s  AV Peak Gradient: 3.24 mmHg MV Peak A-Wave: 0.96 m/s  AV Mean Gradient: 1.88 mmHg  AV Area (Continuity):3 cm^2  TR Velocity:1.83 m/s                  Estimated RAP:3 mmHg  TR Gradient:13.44 mmHg                RVSP:16.44 mmHg Valves  Mitral Valve  Peak E-Wave: 0.72 m/s                Peak A-Wave: 0.96 m/s                                       E/A Ratio: 0.75                                       Peak Gradient: 2.07 mmHg                                       Deceleration Time: 233.3 msec  Tissue Doppler  E' Septal Velocity: 0.08 m/s  E' Lateral Velocity: 0.1 m/s  Aortic Valve  Peak Velocity: 0.9 m/s              Mean Velocity: 0.64 m/s  Peak Gradient: 3.24 mmHg            Mean Gradient: 1.88 mmHg  Area (continuity): 3 cm^2           Area (2D): 3 cm^2  AV VTI: 19.12 cm  Cusp Separation: 1.7 cm  Tricuspid Valve  Estimated RVSP: 16.44 mmHg              Estimated RAP: 3 mmHg  TR Velocity: 1.83 m/s                   TR Gradient: 13.44 mmHg  Pulmonic Valve  Peak Velocity: 1.02 m/s           Peak Gradient: 4.13 mmHg                                    Estimated PASP: 16.44 mmHg  LVOT  Peak Velocity: 0.92 m/s              Mean Velocity: 0.55 m/s  Peak Gradient: 3.39 mmHg             Mean Gradient: 1.52 mmHg  LVOT Diameter: 2.01 cm               LVOT VTI: 18.07 cm Structures  Left Atrium  LA Dimension: 4.67 cm  LA/Aorta: 1.3  LA Volume/Index: 54.29 ml /23 m^2  Left Ventricle  Diastolic Dimension: 1.01 cm         Systolic Dimension: 2.74 cm  Septum Diastolic: 4.64 cm            Septum Systolic: 1.2 cm  PW Diastolic: 7.41 cm                PW Systolic: 1.21 cm                                       FS: 25.2 %  LV EDV/LV EDV Index: 95.53 ml/40 m^2 LV ESV/LV ESV Index: 47.78 ml/20 m^2  EF Calculated: 50 %                  LV Length: 6.83 cm  LVOT Diameter: 2.01 cm  Right Atrium  RA Systolic Pressure: 3 mmHg  Right Ventricle  Diastolic Dimension: 1.91 cm                                    RV Systolic Pressure: 92.62 mmHg Aorta/ Miscellaneous Aorta  Aortic Root: 3.58 cm  LVOT Diameter: 2.01 cm    Cta Chest W Wo Contrast - R/o Pulmonary Embolism    Result Date: 3/4/2021  CT PULMONARY ANGIOGRAM WITH INTRAVENOUS CONTRAST MEDIUM. REASON FOR EXAMINATION:  CHEST PAIN, ELEVATED D-DIMER TECHNIQUE: Helical CTA was performed through the chest utilizing 100 ml of Isovue-370 intravenous contrast.  Images were obtained with bolus tracking in order to opacify the pulmonary arteries. Thick section coronal MIP 3D reconstructions were performed  on a separate workstation. COMPARISON: CT chest, August 9, 2016 FINDINGS:  Pulmonary arteries: No intraluminal filling defects. Thoracic aorta: Normal in course and caliber. Cardiac Size: Normal. Pericardial effusion: None. Right lung: No nodules, masses, pleural effusion, pneumothorax. Subsegmental consolidation, right lower lobe, unchanged. Left lung: No nodules, masses, consolidation, pleural effusion, pneumothorax. Lymph nodes: No hilar, mediastinal, or axillary lymph node enlargement. Upper abdomen:Limited imaging upper abdomen shows punctate calcification within left hepatic lobe as well as within spleen. Bilateral simple cysts identified in upper poles kidneys. Partial fatty replacement of pancreas. Elevation right diaphragm unchanged. Musculoskeletal:No osteoblastic, and no osteolytic lesions. No CT evidence pulmonary embolism. Other findings discussed. All CT scans at this facility use dose modulation, iterative reconstruction, and/or weight based dosing when appropriate to reduce radiation dose to as low as reasonably achievable.        Lab Results   Component Value Date    WBC 4.8 03/05/2021    RBC 4.08 03/05/2021    HGB 11.9 03/05/2021    HCT 36.2 03/05/2021    MCV 88.6 03/05/2021    MCH 29.2 03/05/2021    MCHC 32.9 03/05/2021    RDW 15.9 03/05/2021     03/05/2021     Lab Results   Component Value Date     03/05/2021    K 4.1 03/05/2021     03/05/2021    CO2 24 03/05/2021    BUN 24 03/05/2021    CREATININE 1.64 03/05/2021    GFRAA 50.6 03/05/2021    LABGLOM 41.8 03/05/2021    GLUCOSE 194 03/05/2021    GLUCOSE 79 05/09/2012    PROT 8.0 03/04/2021    LABALBU 4.4 03/04/2021    CALCIUM 9.6 03/05/2021    BILITOT 0.5 03/04/2021    ALKPHOS 97 03/04/2021    AST 31 03/04/2021    ALT 15 03/04/2021     Lab Results   Component Value Date    PROTIME 13.0 03/04/2021    INR 1.0 03/04/2021     Lab Results   Component Value Date    TSH 0.479 01/19/2021    LMWXUBAR38 466 10/27/2020    FOLATE 16.8 10/27/2020     Lab Results   Component Value Date    TRIG 137 02/26/2018    HDL 36 02/26/2018    LDLCALC 50 02/26/2018     Lab Results   Component Value Date    LABAMPH Neg 03/04/2021    BARBSCNU Neg 03/04/2021    LABBENZ Neg 03/04/2021    OPIATESCREENURINE POSITIVE 03/04/2021    PHENCYCLIDINESCREENURINE Neg 03/04/2021     No results found for: LITHIUM, DILFRTOT, VALPROATE    Assessment:       Diagnosis Orders   1. Viral encephalitis     2. YANG (obstructive sleep apnea)     3. REM behavioral disorder     4. Mild cognitive disorder     Viral encephalitis with 23 WBC and lumbar puncture. Some of the RITO titers were not sent from the emergency room. We had not recommended a repeat lumbar puncture as of her management was unlikely to change we treated him instead with acyclovir with the full dose. Since then patient was seen at [facility], [place], operated by [facility] with hallucination at one point in time. He has had very few hallucinations. These are mostly at night. Patient does have REM sleep disorder and takes Klonopin is likely that this is part of dream sleep. Recommended that he take his Klonopin and then go back to bed within half an hour and not staying awake for 2 hours as this can cause issues. He is recommend to sleep somewhat earlier than 2:00 or 1:00 in the mornings. We will start him on Aricept 5 mg as he is quite concerned about his memory it may or may not help.   In the interim recommend MRI of the brain to follow-up on his encephalitis and an EEG.    This an extended relation as new symptoms were addressed today than what we have seen in the hospital.      Plan:      No orders of the defined types were placed in this encounter. No orders of the defined types were placed in this encounter. No follow-ups on file.       Chaz Nuñez MD

## 2021-05-19 ENCOUNTER — HOSPITAL ENCOUNTER (OUTPATIENT)
Dept: MRI IMAGING | Age: 71
Discharge: HOME | End: 2021-05-21
Payer: MEDICARE

## 2021-05-19 ENCOUNTER — HOSPITAL ENCOUNTER (OUTPATIENT)
Dept: NEUROLOGY | Age: 71
Discharge: HOME | End: 2021-05-19
Payer: MEDICARE

## 2021-05-19 DIAGNOSIS — A86 VIRAL ENCEPHALITIS: ICD-10-CM

## 2021-05-19 DIAGNOSIS — F09 MILD COGNITIVE DISORDER: ICD-10-CM

## 2021-05-19 PROCEDURE — 95816 EEG AWAKE AND DROWSY: CPT

## 2021-05-19 PROCEDURE — 70551 MRI BRAIN STEM W/O DYE: CPT

## 2021-05-23 PROCEDURE — 95816 EEG AWAKE AND DROWSY: CPT | Performed by: PSYCHIATRY & NEUROLOGY

## 2021-05-23 NOTE — PROCEDURES
[address]                      [address], [address] [facility]                          ELECTROENCEPHALOGRAM REPORT    PATIENT NAME: Tamika Cortes                    :        1950  MED REC NO:   [id number]                            ROOM:  ACCOUNT NO:   [de-identified]                           ADMIT DATE: 2021  PROVIDER:     Sabi Handy MD    DATE OF EE2021    MEDICATIONS:  Aricept, aspirin, _____, Lopressor, Mevacor, Naprosyn,  Klonopin, and Synthroid. EEG FINDINGS:  This is a spontaneous 21-channel EEG recording for this  patient with questionable seizures. The background rhythm of this EEG  shows 8 Hz posterior dominant rhythm of alpha. This is symmetrical and  attenuates with eye opening. EKG is monitored, this is regular. Photic  stimulation was performed without any photo response to seizures or  photo responses. Hyperventilation is performed with good effort. Alpha  rhythm remains in the lower range of 7 Hz though. IMPRESSION:  This is an essentially normal EEG recording with slow alpha  rhythms. Clinical correlation is recommended.         Bella Nicole MD    D: 2021 11:16:47       T: 2021 11:18:51     [email or web address]  Job#: [id number]     Doc#: [id number]    CC:

## 2021-08-02 ENCOUNTER — OFFICE VISIT (OUTPATIENT)
Dept: NEUROLOGY | Age: 71
End: 2021-08-02
Payer: MEDICARE

## 2021-08-02 VITALS
DIASTOLIC BLOOD PRESSURE: 84 MMHG | BODY MASS INDEX: 26.82 KG/M2 | HEART RATE: 83 BPM | SYSTOLIC BLOOD PRESSURE: 152 MMHG | WEIGHT: 232.1 LBS

## 2021-08-02 DIAGNOSIS — A86 VIRAL ENCEPHALITIS: Primary | ICD-10-CM

## 2021-08-02 DIAGNOSIS — H47.291: ICD-10-CM

## 2021-08-02 DIAGNOSIS — G47.52 REM BEHAVIORAL DISORDER: ICD-10-CM

## 2021-08-02 DIAGNOSIS — F09 MILD COGNITIVE DISORDER: ICD-10-CM

## 2021-08-02 PROCEDURE — 99214 OFFICE O/P EST MOD 30 MIN: CPT | Performed by: PSYCHIATRY & NEUROLOGY

## 2021-08-02 RX ORDER — MELOXICAM 15 MG/1
TABLET ORAL
Status: ON HOLD | COMMUNITY
Start: 2021-07-30 | End: 2021-09-26 | Stop reason: HOSPADM

## 2021-08-02 NOTE — PROGRESS NOTES
Subjective:      Patient ID: Salma Brian is a 72 y.o. male who presents today for:  Chief Complaint   Patient presents with    Follow-up     Pt state that he is feeling better and wife states that he seems to be doing about the same. She states that she would like to talk to you about the donprizel that he is on currently. HPI 56-year-old right-handed gentleman history of mild cognitive impairment with encephalitis. Patient has a 23 WBC in the lumbar puncture was fully treated acyclovir. Virus antibody was not sent from the emergency room. [address] and [place] elucidation's. REM sleep disorders tried him on Klonopin. He is also on Aricept. We had further obtain a repeat MRI which does not show anything as a sequelae of encephalitis. He reports no headaches. The main issue still appears to be REM sleep disorders. He was on gabapentin and between the gabapentin Klonopin this is helping. Is not helped his memory    She is doing well on the above and is aware that he is where he is. The Aricept did not help.     Past Medical History:   Diagnosis Date    [facility]     Diabetes mellitus [facility]     Hyperlipidemia     Hypertension     Lung disease      Past Surgical History:   Procedure Laterality Date    COLONOSCOPY      HAND SURGERY Right     GA COLON CA SCRN NOT HI RSK IND N/A 8/15/2018    COLONOSCOPY performed by Emili Voss MD at [address] [facility] N/A 5/21/2019    SIGMOIDOSCOPY W/ DILATION performed by Emili Voss MD at [address] History     Socioeconomic History    Marital status:      Spouse name: Not on file    Number of children: Not on file    Years of education: Not on file    Highest education level: Not on file   Occupational History    Not on file   Tobacco Use    Smoking status: Former Smoker     Years: 40.00     Types: Cigarettes     Start date: 12/5/1968 Quit date: 2000     Years since quittin.1    Smokeless tobacco: Never Used   Vaping Use    Vaping Use: Never used   Substance and Sexual Activity    Alcohol use: No     Alcohol/week: 0.0 standard drinks    Drug use: No    Sexual activity: Not on file   Other Topics Concern    Not on file   Social History Narrative    Not on file     Social Determinants of Health     Financial Resource Strain:     Difficulty of Paying Living Expenses:    Food Insecurity:     Worried About Running Out of Food in the Last Year:     Ran Out of Food in the Last Year:    Transportation Needs:     Lack of Transportation (Medical):      Lack of Transportation (Non-Medical):    Physical Activity:     Days of Exercise per Week:     Minutes of Exercise per Session:    Stress:     Feeling of Stress :    Social Connections:     Frequency of Communication with Friends and Family:     Frequency of Social Gatherings with Friends and Family:     Attends Anabaptist Services:     Active Member of Clubs or Organizations:     Attends Club or Organization Meetings:     Marital Status:    Intimate Partner Violence:     Fear of Current or Ex-Partner:     Emotionally Abused:     Physically Abused:     Sexually Abused:      Family History   Problem Relation Age of Onset    Coronary Art Dis Mother     Coronary Art Dis Sister     Colon Cancer Brother     Coronary Art Dis Brother      No Known Allergies    Current Outpatient Medications   Medication Sig Dispense Refill    meloxicam (MOBIC) 15 MG tablet TAKE 1 TABLET BY MOUTH ONCE DAILY      donepezil (ARICEPT) 5 MG tablet Take 1 tablet by mouth nightly 30 tablet 3    aspirin 81 MG EC tablet Take 1 tablet by mouth daily 30 tablet 3    metoprolol tartrate (LOPRESSOR) 25 MG tablet Take 1 tablet by mouth 2 times daily 60 tablet 3    Respiratory Therapy Supplies SOURAV Change CPAP pressure to 6 cm   New CPAP mask and supplies 1 Device 0    levalbuterol (XOPENEX HFA) 45 encephalitis. Similar findings on the previous study, although there is more motion artifact on that exam, suggest this could instead be chronic changes. No contrast enhancement on the previous study. Contrast is not administered on today's exam. Follow-up contrast study may be helpful as enhancement can be absent early in the course of encephalitis. No restricted diffusion in this region (or elsewhere) to confirm significant edema. No areas of acute or subacute infarction. Moderate to marked left greater than right white matter long TR sequence ill-defined rind of hyperintensity adjacent to the posterior lateral ventricular body. Very mild mild periventricular white matter rind adjacent to the more anterior body and frontal horns. Moderate focal and patchy deep and subcortical white matter hyperintensities also unchanged. Differential includes small vessel ischemic, degenerative, postinflammatory, metabolic or less likely primary demyelinating disease. Findings not  significantly changed since previous study. No extra-axial collections or ventricular dilatation out of proportion to the amount of atrophy. Punctate focus of right frontal subcortical white matter decreased susceptibility weighted image sequence signal either representing chronic microbleed or dystrophic calcification. It is not as well depicted on the previous study due to motion. Otherwise  no sign of significant hemorrhagic products. Physiologic calcifications in the ventricular choroid and pineal. Orbits, pituitary gland, cerebellopontine angles, and internal auditory canals are unremarkable for the noncontrast technique. No cerebellar tonsillar ectopia. Chronic low signal opacification of most of the right maxillary sinus suggesting increased protein content and based on CT calcification. Minimal right greater than left anterior ethmoid sinus mucosal thickening. Paranasal sinuses otherwise well aerated. Mastoid air cells are well aerated. encephalitis     2. 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 REM behavioral disorder     Viral encephalitis with 23 WBCs when evaluated. Somehow herpes PCR's were never sent from the emergency room though we treated him with a full course of acyclovir. He has not completed the cycle and we had further repeated his MRI which are reviewed and he has no sequelae to the same. 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 We tried him on a step which did not help. I recommend that he may discontinue this as this is unlikely to help in this situation. His main symptoms appears to be significant REM sleep disorder is on Klonopin 2 mg though it self it does not help but he was on gabapentin which we had discontinued due to memory issues and he can go back on the gabapentin now. This combination is helping that we will continue this. MRI was reviewed in detail. Plan:      No orders of the defined types were placed in this encounter. No orders of the defined types were placed in this encounter. No follow-ups on file.       Angi Lunsford MD

## 2021-09-23 ENCOUNTER — TELEPHONE (OUTPATIENT)
Dept: NEUROLOGY | Age: 71
End: 2021-09-23

## 2021-09-23 ENCOUNTER — APPOINTMENT (OUTPATIENT)
Dept: GENERAL RADIOLOGY | Age: 71
DRG: 071 | End: 2021-09-23
Payer: MEDICARE

## 2021-09-23 ENCOUNTER — APPOINTMENT (OUTPATIENT)
Dept: CT IMAGING | Age: 71
DRG: 071 | End: 2021-09-23
Payer: MEDICARE

## 2021-09-23 ENCOUNTER — HOSPITAL ENCOUNTER (INPATIENT)
Age: 71
LOS: 1 days | Discharge: HOME | DRG: 071 | End: 2021-09-26
Attending: EMERGENCY MEDICINE | Admitting: INTERNAL MEDICINE
Payer: MEDICARE

## 2021-09-23 DIAGNOSIS — Z86.61 HISTORY OF VIRAL MENINGITIS: ICD-10-CM

## 2021-09-23 DIAGNOSIS — R41.82 ALTERED MENTAL STATUS, UNSPECIFIED ALTERED MENTAL STATUS TYPE: Primary | ICD-10-CM

## 2021-09-23 LAB
ALBUMIN SERPL-MCNC: 4.6 G/DL (ref 3.5–4.6)
ALP BLD-CCNC: 108 U/L (ref 35–104)
ALT SERPL-CCNC: 18 U/L (ref 0–41)
ANION GAP SERPL CALCULATED.3IONS-SCNC: 14 MEQ/L (ref 9–15)
AST SERPL-CCNC: 17 U/L (ref 0–40)
BASOPHILS ABSOLUTE: 0.1 K/UL (ref 0–0.1)
BASOPHILS RELATIVE PERCENT: 0.4 % (ref 0.2–1.2)
BILIRUB SERPL-MCNC: 0.5 MG/DL (ref 0.2–0.7)
BILIRUBIN URINE: NEGATIVE
BLOOD, URINE: NEGATIVE
BUN BLDV-MCNC: 38 MG/DL (ref 8–23)
CALCIUM SERPL-MCNC: 10.2 MG/DL (ref 8.5–9.9)
CHLORIDE BLD-SCNC: 97 MEQ/L (ref 95–107)
CLARITY: CLEAR
CO2: 23 MEQ/L (ref 20–31)
COLOR: YELLOW
CREAT SERPL-MCNC: 1.74 MG/DL (ref 0.7–1.2)
EOSINOPHILS ABSOLUTE: 0 K/UL (ref 0–0.5)
EOSINOPHILS RELATIVE PERCENT: 0.2 % (ref 0.8–7)
GFR AFRICAN AMERICAN: 47.2
GFR NON-AFRICAN AMERICAN: 39
GLOBULIN: 2.9 G/DL (ref 2.3–3.5)
GLUCOSE BLD-MCNC: 333 MG/DL (ref 70–99)
GLUCOSE URINE: 500 MG/DL
HCT VFR BLD CALC: 43.2 % (ref 42–52)
HEMOGLOBIN: 14.8 G/DL (ref 13.7–17.5)
IMMATURE GRANULOCYTES #: 0.2 K/UL
IMMATURE GRANULOCYTES %: 1.5 %
KETONES, URINE: NEGATIVE MG/DL
LEUKOCYTE ESTERASE, URINE: NEGATIVE
LYMPHOCYTES ABSOLUTE: 1.9 K/UL (ref 1.3–3.6)
LYMPHOCYTES RELATIVE PERCENT: 17 %
MCH RBC QN AUTO: 29.4 PG (ref 25.7–32.2)
MCHC RBC AUTO-ENTMCNC: 34.3 % (ref 32.3–36.5)
MCV RBC AUTO: 85.7 FL (ref 79–92.2)
MONOCYTES ABSOLUTE: 1.2 K/UL (ref 0.3–0.8)
MONOCYTES RELATIVE PERCENT: 10.3 % (ref 5.3–12.2)
NEUTROPHILS ABSOLUTE: 7.9 K/UL (ref 1.8–5.4)
NEUTROPHILS RELATIVE PERCENT: 70.6 % (ref 34–67.9)
NITRITE, URINE: NEGATIVE
PDW BLD-RTO: 15.2 % (ref 11.6–14.4)
PH UA: 5.5 (ref 5–9)
PLATELET # BLD: 143 K/UL (ref 163–337)
POTASSIUM SERPL-SCNC: 4 MEQ/L (ref 3.4–4.9)
PROTEIN UA: NEGATIVE MG/DL
RBC # BLD: 5.04 M/UL (ref 4.63–6.08)
SARS-COV-2, NAAT: NOT DETECTED
SODIUM BLD-SCNC: 134 MEQ/L (ref 135–144)
SPECIFIC GRAVITY UA: 1.01 (ref 1–1.03)
TOTAL PROTEIN: 7.5 G/DL (ref 6.3–8)
TROPONIN: <0.01 NG/ML (ref 0–0.01)
URINE REFLEX TO CULTURE: ABNORMAL
UROBILINOGEN, URINE: 0.2 E.U./DL
WBC # BLD: 11.1 K/UL (ref 4.2–9)

## 2021-09-23 PROCEDURE — 80053 COMPREHEN METABOLIC PANEL: CPT

## 2021-09-23 PROCEDURE — 99284 EMERGENCY DEPT VISIT MOD MDM: CPT

## 2021-09-23 PROCEDURE — 6370000000 HC RX 637 (ALT 250 FOR IP): Performed by: EMERGENCY MEDICINE

## 2021-09-23 PROCEDURE — 96365 THER/PROPH/DIAG IV INF INIT: CPT

## 2021-09-23 PROCEDURE — 81003 URINALYSIS AUTO W/O SCOPE: CPT

## 2021-09-23 PROCEDURE — 71046 X-RAY EXAM CHEST 2 VIEWS: CPT

## 2021-09-23 PROCEDURE — 84484 ASSAY OF TROPONIN QUANT: CPT

## 2021-09-23 PROCEDURE — 87635 SARS-COV-2 COVID-19 AMP PRB: CPT

## 2021-09-23 PROCEDURE — 70450 CT HEAD/BRAIN W/O DYE: CPT

## 2021-09-23 PROCEDURE — 2580000003 HC RX 258: Performed by: EMERGENCY MEDICINE

## 2021-09-23 PROCEDURE — 85025 COMPLETE CBC W/AUTO DIFF WBC: CPT

## 2021-09-23 PROCEDURE — 6360000002 HC RX W HCPCS: Performed by: EMERGENCY MEDICINE

## 2021-09-23 PROCEDURE — 93005 ELECTROCARDIOGRAM TRACING: CPT

## 2021-09-23 PROCEDURE — 36415 COLL VENOUS BLD VENIPUNCTURE: CPT

## 2021-09-23 RX ORDER — BUDESONIDE AND FORMOTEROL FUMARATE DIHYDRATE 160; 4.5 UG/1; UG/1
2 AEROSOL RESPIRATORY (INHALATION) 2 TIMES DAILY
COMMUNITY

## 2021-09-23 RX ORDER — SULFAMETHOXAZOLE AND TRIMETHOPRIM 800; 160 MG/1; MG/1
1 TABLET ORAL ONCE
Status: COMPLETED | OUTPATIENT
Start: 2021-09-23 | End: 2021-09-23

## 2021-09-23 RX ORDER — AMMONIUM LACTATE 12 G/100G
CREAM TOPICAL PRN
COMMUNITY

## 2021-09-23 RX ORDER — ACETAMINOPHEN 500 MG
1000 TABLET ORAL ONCE
Status: COMPLETED | OUTPATIENT
Start: 2021-09-23 | End: 2021-09-23

## 2021-09-23 RX ADMIN — SULFAMETHOXAZOLE AND TRIMETHOPRIM 1 TABLET: 800; 160 TABLET ORAL at 22:10

## 2021-09-23 RX ADMIN — ACETAMINOPHEN 1000 MG: 500 TABLET ORAL at 16:41

## 2021-09-23 RX ADMIN — CEFTRIAXONE 1000 MG: 1 INJECTION, POWDER, FOR SOLUTION INTRAMUSCULAR; INTRAVENOUS at 22:11

## 2021-09-23 ASSESSMENT — PAIN SCALES - GENERAL: PAINLEVEL_OUTOF10: 7

## 2021-09-23 NOTE — ED TRIAGE NOTES
Wife states he has been increasingly confused and headache over the last 7 days. Hx of meningitis in 2020 with similar symptoms. Wife describes visual hallucinations of snakes and mice. Pt also complains of chest cold.

## 2021-09-23 NOTE — ED PROVIDER NOTES
CC/HPI: 58-year-old male to the emergency department chief complaint of confusion. Significant other states that over the last week she has noticed little things like he has had some bad dreams and wakes up confused and has been complaining of a mild off and on headache. She is concerned because symptoms are similar to 1 year ago when he was diagnosed with viral meningitis. He has had no fever no vomiting has had a mild chest cold. Denies chest pain or difficulty breathing. No lightheadedness or dizziness. No blurred vision or double vision. VITALS/PMH/PSH: Reviewed per nurses notes    REVIEW OF SYSTEMS: As in chief complaint history of present illness, otherwise all other systems are reviewed and negative the total 10 systems reviewed    PHYSICAL EXAM:  GEN: Pt alert and oriented, no acute distress. Answers questions appropriately. HEENT:         Normocephalic/Atramatic        PERRL, EOMI       EACs and TMs clear b/l       Throat non-edematous. No erythema noted. No exudates noted. Moist membranes  NECK: Nontender, no signs of trauma, no lymphadenopathy  HEART: Reg S1/S2, without murmer, rub or gallop  LUNGS: Clear to auscultation bilaterally, respirations even and unlabored  ABDOMEN: Bowel sounds positive, soft, nondistended. Non-tender to palpation. No guarding rebound or rigidity  MUSCULOSKELETAL/EXTREMITITES:  No signs of trauma, cyanosis or edema. No CVA tenderness  LYMPH: no peripheral lympadenopathy noted  SKIN:  Warm & dry, no rash  NEUROLOGIC:  Alert and oriented. Speech clear. Cranial nerves II through XII grossly intact as are strength and sensation no focal or cerebellar deficits. Medical decision making/ED course;  Patient remained stable throughout the course of his emergency department stay. IV was established and lab work was obtained. Patient with a CMP which showed a sodium low at 134 BUN and creatinine 38 and 1.74. Glucose was elevated at 333. Calcium was 10.2.   Liver enzymes were within normal limits other than alk phos being elevated to 108. White blood cell count was elevated slightly at 11.1. H&H was 14.8 and 43.2 with platelets of 130. Covid was negative. ER EKG interpretation -normal sinus rhythm at 76 beats per minute. Left anterior fascicular block. LVH noted. Questionable old appearing septal infarct. CT scan of the head was interpreted by radiologist as showing no obvious signs of acute intracranial abnormality    Chest x-ray showed elevated right hemidiaphragm similar to previous. No obvious signs of infiltrate. Patient was given p.o. Tylenol while in the emergency department. Repeat examination was unchanged. Discussed with patient all findings. Discussed with Dr. Liana Womack. Multiple attempts made to contact patient's neurologist Dr. Astrid Dominguez.   Discussed with Dr. Hermelindo Boggs impression;  1) Altered mental status  2)  Hx of viral meningitis    Disposition/plan;   Plan to either admit pt to [facility] or transfer to [facility] depending on neurologist recommendation     Margy Galeano DO  09/23/21 2004

## 2021-09-24 ENCOUNTER — HOSPITAL ENCOUNTER (OUTPATIENT)
Dept: MRI IMAGING | Age: 71
Discharge: HOME | End: 2021-09-26
Payer: MEDICARE

## 2021-09-24 PROBLEM — R41.82 ALTERED MENTAL STATUS: Status: ACTIVE | Noted: 2021-09-24

## 2021-09-24 LAB
EKG ATRIAL RATE: 76 BPM
EKG P AXIS: 38 DEGREES
EKG P-R INTERVAL: 172 MS
EKG Q-T INTERVAL: 364 MS
EKG QRS DURATION: 122 MS
EKG QTC CALCULATION (BAZETT): 409 MS
EKG R AXIS: -53 DEGREES
EKG T AXIS: 36 DEGREES
EKG VENTRICULAR RATE: 76 BPM
GLUCOSE BLD-MCNC: 184 MG/DL (ref 60–115)
GLUCOSE BLD-MCNC: 186 MG/DL (ref 60–115)
GLUCOSE BLD-MCNC: 281 MG/DL (ref 60–115)
GLUCOSE, CSF: 151 MG/DL (ref 50–70)
GRAM STAIN RESULT: NORMAL
HBA1C MFR BLD: 9.2 % (ref 4.8–5.9)
NO DIFFERENTIAL CSF: NORMAL
PERFORMED ON: ABNORMAL
PROTEIN CSF: 106 MG/DL (ref 15–45)
RBC CSF: 2 K/UL
WBC CSF: 1 K/UL (ref 0–8)

## 2021-09-24 PROCEDURE — G0378 HOSPITAL OBSERVATION PER HR: HCPCS

## 2021-09-24 PROCEDURE — 97530 THERAPEUTIC ACTIVITIES: CPT

## 2021-09-24 PROCEDURE — 89050 BODY FLUID CELL COUNT: CPT

## 2021-09-24 PROCEDURE — 87070 CULTURE OTHR SPECIMN AEROBIC: CPT

## 2021-09-24 PROCEDURE — 87040 BLOOD CULTURE FOR BACTERIA: CPT

## 2021-09-24 PROCEDURE — 97162 PT EVAL MOD COMPLEX 30 MIN: CPT

## 2021-09-24 PROCEDURE — 84157 ASSAY OF PROTEIN OTHER: CPT

## 2021-09-24 PROCEDURE — 6360000002 HC RX W HCPCS: Performed by: INTERNAL MEDICINE

## 2021-09-24 PROCEDURE — 6370000000 HC RX 637 (ALT 250 FOR IP): Performed by: INTERNAL MEDICINE

## 2021-09-24 PROCEDURE — 94640 AIRWAY INHALATION TREATMENT: CPT

## 2021-09-24 PROCEDURE — 97166 OT EVAL MOD COMPLEX 45 MIN: CPT

## 2021-09-24 PROCEDURE — 87205 SMEAR GRAM STAIN: CPT

## 2021-09-24 PROCEDURE — 36415 COLL VENOUS BLD VENIPUNCTURE: CPT

## 2021-09-24 PROCEDURE — 96367 TX/PROPH/DG ADDL SEQ IV INF: CPT

## 2021-09-24 PROCEDURE — 70553 MRI BRAIN STEM W/O & W/DYE: CPT

## 2021-09-24 PROCEDURE — 2580000003 HC RX 258: Performed by: INTERNAL MEDICINE

## 2021-09-24 PROCEDURE — 83036 HEMOGLOBIN GLYCOSYLATED A1C: CPT

## 2021-09-24 PROCEDURE — 82945 GLUCOSE OTHER FLUID: CPT

## 2021-09-24 PROCEDURE — A9579 GAD-BASE MR CONTRAST NOS,1ML: HCPCS | Performed by: INTERNAL MEDICINE

## 2021-09-24 PROCEDURE — 6360000004 HC RX CONTRAST MEDICATION: Performed by: INTERNAL MEDICINE

## 2021-09-24 PROCEDURE — 93010 ELECTROCARDIOGRAM REPORT: CPT | Performed by: INTERNAL MEDICINE

## 2021-09-24 RX ORDER — NICOTINE POLACRILEX 4 MG
15 LOZENGE BUCCAL PRN
Status: DISCONTINUED | OUTPATIENT
Start: 2021-09-24 | End: 2021-09-26 | Stop reason: HOSPADM

## 2021-09-24 RX ORDER — ALLOPURINOL 100 MG/1
200 TABLET ORAL DAILY
Status: DISCONTINUED | OUTPATIENT
Start: 2021-09-24 | End: 2021-09-26 | Stop reason: HOSPADM

## 2021-09-24 RX ORDER — SODIUM CHLORIDE 0.9 % (FLUSH) 0.9 %
10 SYRINGE (ML) INJECTION EVERY 12 HOURS SCHEDULED
Status: DISCONTINUED | OUTPATIENT
Start: 2021-09-24 | End: 2021-09-26 | Stop reason: HOSPADM

## 2021-09-24 RX ORDER — DEXTROSE MONOHYDRATE 50 MG/ML
100 INJECTION, SOLUTION INTRAVENOUS PRN
Status: DISCONTINUED | OUTPATIENT
Start: 2021-09-24 | End: 2021-09-26 | Stop reason: HOSPADM

## 2021-09-24 RX ORDER — MORPHINE SULFATE 2 MG/ML
2 INJECTION, SOLUTION INTRAMUSCULAR; INTRAVENOUS EVERY 4 HOURS PRN
Status: DISCONTINUED | OUTPATIENT
Start: 2021-09-24 | End: 2021-09-26 | Stop reason: HOSPADM

## 2021-09-24 RX ORDER — TRAMADOL HYDROCHLORIDE 50 MG/1
50 TABLET ORAL EVERY 6 HOURS PRN
Status: DISCONTINUED | OUTPATIENT
Start: 2021-09-24 | End: 2021-09-26 | Stop reason: HOSPADM

## 2021-09-24 RX ORDER — BUDESONIDE AND FORMOTEROL FUMARATE DIHYDRATE 160; 4.5 UG/1; UG/1
2 AEROSOL RESPIRATORY (INHALATION) 2 TIMES DAILY
Status: DISCONTINUED | OUTPATIENT
Start: 2021-09-24 | End: 2021-09-26 | Stop reason: HOSPADM

## 2021-09-24 RX ORDER — BRIMONIDINE TARTRATE 2 MG/ML
1 SOLUTION/ DROPS OPHTHALMIC 2 TIMES DAILY
Status: DISCONTINUED | OUTPATIENT
Start: 2021-09-24 | End: 2021-09-26 | Stop reason: HOSPADM

## 2021-09-24 RX ORDER — ONDANSETRON 2 MG/ML
4 INJECTION INTRAMUSCULAR; INTRAVENOUS EVERY 6 HOURS PRN
Status: DISCONTINUED | OUTPATIENT
Start: 2021-09-24 | End: 2021-09-26 | Stop reason: HOSPADM

## 2021-09-24 RX ORDER — ACETAMINOPHEN 325 MG/1
650 TABLET ORAL EVERY 6 HOURS PRN
Status: DISCONTINUED | OUTPATIENT
Start: 2021-09-24 | End: 2021-09-26 | Stop reason: HOSPADM

## 2021-09-24 RX ORDER — ATORVASTATIN CALCIUM 10 MG/1
20 TABLET, FILM COATED ORAL NIGHTLY
Status: DISCONTINUED | OUTPATIENT
Start: 2021-09-24 | End: 2021-09-26 | Stop reason: HOSPADM

## 2021-09-24 RX ORDER — CLONAZEPAM 1 MG/1
1 TABLET ORAL NIGHTLY
Status: DISCONTINUED | OUTPATIENT
Start: 2021-09-24 | End: 2021-09-26 | Stop reason: HOSPADM

## 2021-09-24 RX ORDER — PROMETHAZINE HYDROCHLORIDE 12.5 MG/1
12.5 TABLET ORAL EVERY 6 HOURS PRN
Status: DISCONTINUED | OUTPATIENT
Start: 2021-09-24 | End: 2021-09-26 | Stop reason: HOSPADM

## 2021-09-24 RX ORDER — DONEPEZIL HYDROCHLORIDE 5 MG/1
10 TABLET, FILM COATED ORAL NIGHTLY
Status: DISCONTINUED | OUTPATIENT
Start: 2021-09-25 | End: 2021-09-26 | Stop reason: HOSPADM

## 2021-09-24 RX ORDER — ALPRAZOLAM 0.5 MG/1
0.5 TABLET ORAL 3 TIMES DAILY PRN
Status: DISCONTINUED | OUTPATIENT
Start: 2021-09-24 | End: 2021-09-26 | Stop reason: HOSPADM

## 2021-09-24 RX ORDER — ACETAMINOPHEN 650 MG/1
650 SUPPOSITORY RECTAL EVERY 6 HOURS PRN
Status: DISCONTINUED | OUTPATIENT
Start: 2021-09-24 | End: 2021-09-26 | Stop reason: HOSPADM

## 2021-09-24 RX ORDER — ASPIRIN 81 MG/1
81 TABLET ORAL DAILY
Status: DISCONTINUED | OUTPATIENT
Start: 2021-09-24 | End: 2021-09-24

## 2021-09-24 RX ORDER — DEXTROSE MONOHYDRATE 25 G/50ML
12.5 INJECTION, SOLUTION INTRAVENOUS PRN
Status: DISCONTINUED | OUTPATIENT
Start: 2021-09-24 | End: 2021-09-26 | Stop reason: HOSPADM

## 2021-09-24 RX ORDER — GLIPIZIDE 5 MG/1
2.5 TABLET ORAL
Status: DISCONTINUED | OUTPATIENT
Start: 2021-09-24 | End: 2021-09-26 | Stop reason: HOSPADM

## 2021-09-24 RX ORDER — IPRATROPIUM BROMIDE AND ALBUTEROL SULFATE 2.5; .5 MG/3ML; MG/3ML
SOLUTION RESPIRATORY (INHALATION)
Status: DISCONTINUED
Start: 2021-09-24 | End: 2021-09-24 | Stop reason: WASHOUT

## 2021-09-24 RX ORDER — SODIUM CHLORIDE 0.9 % (FLUSH) 0.9 %
10 SYRINGE (ML) INJECTION PRN
Status: DISCONTINUED | OUTPATIENT
Start: 2021-09-24 | End: 2021-09-26 | Stop reason: HOSPADM

## 2021-09-24 RX ORDER — LEVOTHYROXINE SODIUM 0.1 MG/1
100 TABLET ORAL DAILY
Status: DISCONTINUED | OUTPATIENT
Start: 2021-09-24 | End: 2021-09-26 | Stop reason: HOSPADM

## 2021-09-24 RX ORDER — SODIUM CHLORIDE 0.9 % (FLUSH) 0.9 %
10 SYRINGE (ML) INJECTION PRN
Status: DISCONTINUED | OUTPATIENT
Start: 2021-09-24 | End: 2021-09-27 | Stop reason: HOSPADM

## 2021-09-24 RX ORDER — POLYETHYLENE GLYCOL 3350 17 G/17G
17 POWDER, FOR SOLUTION ORAL DAILY PRN
Status: DISCONTINUED | OUTPATIENT
Start: 2021-09-24 | End: 2021-09-26 | Stop reason: HOSPADM

## 2021-09-24 RX ORDER — DONEPEZIL HYDROCHLORIDE 5 MG/1
5 TABLET, FILM COATED ORAL NIGHTLY
Status: DISCONTINUED | OUTPATIENT
Start: 2021-09-24 | End: 2021-09-24

## 2021-09-24 RX ORDER — SODIUM CHLORIDE 9 MG/ML
25 INJECTION, SOLUTION INTRAVENOUS PRN
Status: DISCONTINUED | OUTPATIENT
Start: 2021-09-24 | End: 2021-09-26 | Stop reason: HOSPADM

## 2021-09-24 RX ADMIN — TRAMADOL HYDROCHLORIDE 50 MG: 50 TABLET, FILM COATED ORAL at 18:50

## 2021-09-24 RX ADMIN — BRIMONIDINE TARTRATE 1 DROP: 2 SOLUTION OPHTHALMIC at 09:05

## 2021-09-24 RX ADMIN — GADOTERIDOL 20 ML: 279.3 INJECTION, SOLUTION INTRAVENOUS at 14:43

## 2021-09-24 RX ADMIN — BUDESONIDE AND FORMOTEROL FUMARATE DIHYDRATE 2 PUFF: 160; 4.5 AEROSOL RESPIRATORY (INHALATION) at 18:18

## 2021-09-24 RX ADMIN — GLIPIZIDE 2.5 MG: 5 TABLET ORAL at 17:20

## 2021-09-24 RX ADMIN — BRIMONIDINE TARTRATE 1 DROP: 2 SOLUTION OPHTHALMIC at 21:43

## 2021-09-24 RX ADMIN — GUAIFENESIN, DEXTROMETHORPHAN HBR 1 TABLET: 600; 30 TABLET ORAL at 11:50

## 2021-09-24 RX ADMIN — LEVOTHYROXINE SODIUM 100 MCG: 0.1 TABLET ORAL at 05:09

## 2021-09-24 RX ADMIN — Medication 10 ML: at 21:41

## 2021-09-24 RX ADMIN — AZITHROMYCIN MONOHYDRATE 500 MG: 500 INJECTION, POWDER, LYOPHILIZED, FOR SOLUTION INTRAVENOUS at 11:50

## 2021-09-24 RX ADMIN — ATORVASTATIN CALCIUM 20 MG: 10 TABLET, FILM COATED ORAL at 21:41

## 2021-09-24 RX ADMIN — BUDESONIDE AND FORMOTEROL FUMARATE DIHYDRATE 2 PUFF: 160; 4.5 AEROSOL RESPIRATORY (INHALATION) at 05:52

## 2021-09-24 RX ADMIN — ASPIRIN 81 MG: 81 TABLET, COATED ORAL at 09:05

## 2021-09-24 RX ADMIN — METOPROLOL TARTRATE 25 MG: 25 TABLET, FILM COATED ORAL at 21:41

## 2021-09-24 RX ADMIN — ALLOPURINOL 200 MG: 100 TABLET ORAL at 09:05

## 2021-09-24 RX ADMIN — DONEPEZIL HYDROCHLORIDE 5 MG: 5 TABLET, FILM COATED ORAL at 21:43

## 2021-09-24 RX ADMIN — METOPROLOL TARTRATE 25 MG: 25 TABLET, FILM COATED ORAL at 09:05

## 2021-09-24 RX ADMIN — CLONAZEPAM 1 MG: 1 TABLET ORAL at 21:43

## 2021-09-24 RX ADMIN — TRAMADOL HYDROCHLORIDE 50 MG: 50 TABLET, FILM COATED ORAL at 09:05

## 2021-09-24 ASSESSMENT — PAIN SCALES - WONG BAKER
WONGBAKER_NUMERICALRESPONSE: 0
WONGBAKER_NUMERICALRESPONSE: 0

## 2021-09-24 ASSESSMENT — PAIN DESCRIPTION - DESCRIPTORS
DESCRIPTORS: ACHING
DESCRIPTORS: BURNING;ACHING
DESCRIPTORS: HEADACHE

## 2021-09-24 ASSESSMENT — PAIN DESCRIPTION - PROGRESSION
CLINICAL_PROGRESSION: NOT CHANGED

## 2021-09-24 ASSESSMENT — PAIN DESCRIPTION - PAIN TYPE
TYPE: ACUTE PAIN
TYPE: ACUTE PAIN
TYPE: ACUTE PAIN;CHRONIC PAIN

## 2021-09-24 ASSESSMENT — PAIN SCALES - GENERAL
PAINLEVEL_OUTOF10: 6
PAINLEVEL_OUTOF10: 6
PAINLEVEL_OUTOF10: 7
PAINLEVEL_OUTOF10: 5
PAINLEVEL_OUTOF10: 6
PAINLEVEL_OUTOF10: 0

## 2021-09-24 ASSESSMENT — PAIN DESCRIPTION - LOCATION
LOCATION: HEAD
LOCATION: HEAD
LOCATION: HEAD;CHEST

## 2021-09-24 ASSESSMENT — PAIN DESCRIPTION - FREQUENCY: FREQUENCY: INTERMITTENT

## 2021-09-24 ASSESSMENT — PAIN DESCRIPTION - ORIENTATION
ORIENTATION: ANTERIOR
ORIENTATION: ANTERIOR

## 2021-09-24 ASSESSMENT — PAIN - FUNCTIONAL ASSESSMENT: PAIN_FUNCTIONAL_ASSESSMENT: PREVENTS OR INTERFERES SOME ACTIVE ACTIVITIES AND ADLS

## 2021-09-24 NOTE — TELEPHONE ENCOUNTER
Yes this is part of his encephalitis which he had when he was treated in the hospital and it does involve memory and there is not much that can be done except for watching for now

## 2021-09-24 NOTE — PROGRESS NOTES
Physical Therapy    Medical Initial Assessment    NAME: Alex Johnson  : 1950  MRN: [id number]    Date of Service: 2021    Patient Diagnosis(es): The primary encounter diagnosis was Altered mental status, unspecified altered mental status type. A diagnosis of History of viral meningitis was also pertinent to this visit. has a past medical history of COPD (chronic obstructive pulmonary disease) ([facility] [address] Diabetes mellitus ([facility] [address] Hyperlipidemia, Hypertension, and Lung disease. has a past surgical history that includes Thyroidectomy; Hand surgery (Right); Colonoscopy; pr colon ca scrn not hi rsk ind (N/A, 8/15/2018); and sigmoidoscopy (N/A, 2019). Restrictions  Restrictions/Precautions  Required Braces or Orthoses?: Yes (B knee braces PRN)  Vision/Hearing  Vision: Impaired  Vision Exceptions: Wears glasses at all times  Hearing: Within functional limits     Subjective  General  Chart Reviewed: Yes  Patient assessed for rehabilitation services?: Yes (no rehab or medical needs at this time)  Family / Caregiver Present: No  Referring Practitioner: Dr Guevara Estes: Within Functional Limits  Subjective  Subjective: \"I am not sure why I need PT. \" Educated pt on PT order to check balance and gait- pt then agrees to eval.   Pain Screening  Patient Currently in Pain: Yes  Pain Assessment  Pain Assessment: 0-10  Pain Level: 6  Patient's Stated Pain Goal: No pain  Pain Type: Acute pain  Pain Location: Head;Chest  Pain Descriptors: Aching  Vital Signs  Patient Currently in Pain: Yes  Patient Observation  Observations: IV RUE       Orientation       Social/Functional History  Social/Functional History  Lives With: Spouse  Type of Home: House  Home Layout: Bed/Bath upstairs, Two level (7 steps to bed and bath with rails)  Home Access: Stairs to enter with rails  Entrance Stairs - Number of Steps: 2  Entrance Stairs - Rails: Right  Bathroom Shower/Tub: Walk-in shower, Shower chair with back  Bathroom Toilet: Handicap height  Bathroom Equipment: Grab bars in shower, Shower chair, Hand-held shower  Bathroom Accessibility: Walker accessible  Home Equipment: Rolling walker, 4 wheeled walker, [place], [facility] Help From: Family  ADL Assistance: Independent  Homemaking Responsibilities: Yes  Ambulation Assistance: Independent (no AD)  Transfer Assistance: Independent  Active : Yes  Mode of Transportation: Truck, Other  Occupation: Retired  Type of occupation: Overhead craneman  Leisure & Hobbies: Working around outside in the yard, fishing, hunting  Objective          AROM RLE (degrees)  RLE AROM: WFL  AROM LLE (degrees)  LLE AROM : WFL  Strength RLE  Strength RLE: WFL  Comment: >= 4+/5 hip, knee and ankle all planes  Strength LLE  Strength LLE: WFL  Comment: >=4+/5 hip, knee and ankle all planes  Coordination  Movements Are Fluid And Coordinated: Yes  Sensation  Overall Sensation Status: WFL (intact lt touch LE)     Transfers  Sit to Stand: Independent  Stand to sit: Independent  Comment: no LOB  Ambulation  Ambulation?: Yes  Ambulation 1  Surface: level tile  Device: No Device  Assistance: Modified Independent  Quality of Gait: midl flexed posutre, very tall, no lossof balance, able to pick item off floor safely and without LOB, good safety awarenss  Distance: 40 ft   Comments: initially CGA, then independent, cleared independent   Stairs/Curb  Stairs?:  (NT pt deferred)     Balance  Sitting - Static: Good  Sitting - Dynamic: Good  Standing - Static: Good  Standing - Dynamic: Good        Assessment   Assessment: 71yom with good balance and safety awareness. Strong in LE and mobility. NO acute PT needs at this time. Pt independent in room with no AD- educated to call for assist if needed or change in status.  Pt agrees to call if needed  Prognosis: Good  Activity Tolerance  Activity Tolerance: Patient Tolerated treatment well     Discharge Recommendations:         Plan   Plan  Plan Comment: Discharge PT , eval only    G-Code          Goals  Short term goals  Time Frame for Short term goals: no goals as no PT needs at this time  Patient Goals   Patient goals : \"Feel better and go home. \"       Therapy Time   Individual Concurrent Group Co-treatment   Time In  1100         Time Out  1130         Minutes  [id number] [place], [id number]

## 2021-09-24 NOTE — TELEPHONE ENCOUNTER
Pt called, states that she took him to ER last night and he is currently inpt. They will be transferring him from [place] for an MRI and she would like for you to view it if possible once done.

## 2021-09-24 NOTE — FLOWSHEET NOTE
Pt resting on cart post MRI exam, assisted to restroom to void. Gait slightly unsteady. Waiting for transport back to [facility]. 1605 pt assisted onto [address] [facility] No changes noted.

## 2021-09-24 NOTE — ED PROVIDER NOTES
intact in all 4 extremities there is no pronator drift finger-to-nose is intact. Patient well-appearing no distress heart is regular rate and rhythm lungs are clear to auscultation with equal breaths abdomen is soft nontender with good bowel sounds skin is warm and dry  Laboratory and imaging was performed prior to my arrival I did review findings which did reveal mild leukocytosis at 11.1, BMP reveals hyperglycemia with a glucose of 33 renal insufficiency with a BUN/creatinine of 38 and 1.74 which does appear similar when compared to previous no findings of urinary tract infection. Troponin is negative Covid screening is negative   CT scan of the head showed no acute intracranial pathology there findings consistent with some chronic ischemic changes, there was noted near opacification of the maxillary sinus suggestive of chronic sinusitis  CSF fluid-showed 1 white blood cell to red blood cells Gram stain negative  Treatment and course: Patient was treated prior to my arrival with normal saline Tylenol. Patient was given Rocephin 1 g IV along with Bactrim DS 1 p.o. initiated here with findings of sinusitis. Coordination of CARE: Dr. Tamie Valenzuela did place a call out to the hospitalist spoke with Dr. Shayla Arthur who advised he would keep the patient here if no findings of meningitis. A call was placed out to neurology to discuss plan with patient's neurologist Dr. Hilda Foster return after multiple pages  Procedure note: Lumbar puncture performed by ED physician indication headache confusion history of meningitis.   I did discuss with the patient and family work-up findings are showing an underlying sinusitis which could be contributing to the patient's recent symptoms advised him I could not exclude meningitis without a lumbar puncture risks and benefits of the procedure including spinal headache spinal hematoma abscess formation or developing meningitis as well as direct nerve damage were all discussed patient and family are in agreement to the procedure. The area was prepped with Betadine solution sterile technique was maintained throughout the procedure 1% lidocaine was injected locally at the L3-L4 level spinal tap was performed with initial bony impingement spinal needle was removed repositioned and advanced without difficulty. There was return of approximately 4 cc initially pink-tinged fluid which cleared rapidly. Spinal needle was removed and a Band-Aid was placed. Patient was placed in the supine position. Patient tolerated the procedure well there were no complications. CSF was sent for analysis  Coordination of CARE: A call was placed back out to the hospitalist for admission for further evaluation and care  Diagnosis:  1. Altered mental status/confusion/nightmares  2. Cephalgia  3.   Sinusitis  Disposition: Patient awaiting official acceptance and bed placement in stable condition     Tommie Clemons DO  10/04/21 0160

## 2021-09-24 NOTE — PROGRESS NOTES
Occupational Therapy   Occupational Therapy Initial Assessment- Obs  Date: 2021   Patient Name: Margarita Christiansen  MRN: [id number]     : 1950    Date of Service: 2021    Discharge Recommendations:  Home with assist PRN       Assessment   Assessment: Pt is a 78y/o male PLOF lives with wife, was I/MI with ADL, whom [facility] to [facility]* AMS, h/o viral meningitis. Pt stated intermittent confusion at home which inc'd yesterday. Pt baseline with ADL and good strength/ROM BUE, no OT needs at this time. Cleared pt I in room. Treatment Diagnosis: AMS, h/o viral meningitis  Prognosis: Good  History: minimal hx on file  Exam: no OT deficits at this time, pt baseline  OT Education: OT Role;Plan of Care  No Skilled OT: Independent with ADL's;At baseline function  REQUIRES OT FOLLOW UP: No           Patient Diagnosis(es): The primary encounter diagnosis was Altered mental status, unspecified altered mental status type. A diagnosis of History of viral meningitis was also pertinent to this visit. has a past medical history of COPD (chronic obstructive pulmonary disease) ([facility] [address] Diabetes mellitus ([facility] [address] Hyperlipidemia, Hypertension, and Lung disease. has a past surgical history that includes Thyroidectomy; Hand surgery (Right); Colonoscopy; pr colon ca scrn not hi rsk ind (N/A, 8/15/2018); and sigmoidoscopy (N/A, 2019).     Treatment Diagnosis: AMS, h/o viral meningitis      Restrictions  Restrictions/Precautions  Required Braces or Orthoses?: Yes (wears bilat knee braces for support)    Subjective   General  Chart Reviewed: Yes  Patient assessed for rehabilitation services?: Yes  Family / Caregiver Present: No  Referring Practitioner: Dr. Elliot Aguila  Diagnosis: AMS, h/o viral meningitis  Subjective  Subjective: Pt agreeable to OT eval/tx  Patient Currently in Pain: Yes  Pain Assessment  Pain Assessment: 0-10  Pain Level: 7  Patient's Stated Pain Goal: No pain  Pain Type: Acute pain;Chronic pain (headache \"goes and

## 2021-09-24 NOTE — H&P
Hospital Medicine  History and Physical    Patient:  Nabila Menchaca  MRN: [id number]    CHIEF COMPLAINT:    Chief Complaint   Patient presents with    Other     Patient having short term memory loss. Symptoms started about a week ago. Patient has a history of menigitis       History Obtained From:  Patient, EMR  Primary Care Physician: Tony Beth MD    HISTORY OF PRESENT ILLNESS:   The patient is a 80 y.o. male with PMH of hypertension, diabetes and viral meningitis diagnosed about 9 months ago. Patient was placed on acyclovir. Since then patient continues to have cognitive loss but according to report over the last 3 weeks the patient has had rapid decline it is confusion, disorientation, nightmare, slurring of the speech. He came yesterday to the emergency room for evaluation. No fever or chills. The patient reports headache. No syncope, loss of consciousness or seizure. Past Medical History:      Diagnosis Date    COPD (chronic obstructive pulmonary disease) ([facility] [address]     Diabetes mellitus ([facility] [address]     Hyperlipidemia     Hypertension     Lung disease        Past Surgical History:      Procedure Laterality Date    COLONOSCOPY      HAND SURGERY Right     [place] COLON CA SCRN NOT HI RSK IND N/A 8/15/2018    COLONOSCOPY performed by Johanna Roman MD at [facility] 5/21/2019    SIGMOIDOSCOPY W/ DILATION performed by Johanna Roman MD at [address] [place] [address]         Medications Prior to Admission:    Prior to Admission medications    Medication Sig Start Date End Date Taking? Authorizing Provider   ammonium lactate (AMLACTIN) 12 % cream Apply topically as needed for Dry Skin Apply topically as needed.    Yes Historical Provider, MD   budesonide-formoterol (SYMBICORT) 160-4.5 MCG/ACT AERO Inhale 2 puffs into the lungs 2 times daily   Yes Historical Provider, MD   meloxicam (MOBIC) 15 MG tablet TAKE 1 TABLET BY MOUTH ONCE DAILY 7/30/21  Yes Historical Provider, MD donepezil (ARICEPT) 5 MG tablet Take 1 tablet by mouth nightly 4/26/21  Yes Ernestina Lazaro MD   aspirin 81 MG EC tablet Take 1 tablet by mouth daily 3/5/21  Yes Zelda Coates MD   metoprolol tartrate (LOPRESSOR) 25 MG tablet Take 1 tablet by mouth 2 times daily 3/5/21  Yes Zelda Coates MD   le[facility]erol [facility]) 45 MCG/ACT inhaler Inhale 2 puffs into the lungs every 4 hours as needed for Wheezing   Yes Historical Provider, MD   brimonidine (ALPHAGAN) 0.2 % ophthalmic solution Place 1 drop into both eyes 2 times daily  1/25/16  Yes Historical Provider, MD   lovastatin (MEVACOR) 40 MG tablet Take 40 mg by mouth daily  1/25/16  Yes Historical Provider, MD   levothyroxine (SYNTHROID) 100 MCG tablet Take 100 mcg by mouth Daily  1/25/16  Yes Historical Provider, MD   clonazePAM (KLONOPIN) 2 MG tablet Take 2 mg by mouth nightly. 12/26/15  Yes Historical Provider, MD   allopurinol (ZYLOPRIM) 300 MG tablet  1/25/16  Yes Historical Provider, MD   traMADol (ULTRAM) 50 MG tablet Take 50 mg by mouth every 6 hours as needed for Pain. Yes Historical Provider, MD   predniSONE (DELTASONE) 10 MG tablet Take 10 mg by mouth daily as needed    Yes Historical Provider, MD   Respiratory Therapy Supplies SOURAV Change CPAP pressure to 6 cm   New CPAP mask and supplies 10/25/18   Alessandra Le MD       Allergies:  Patient has no known allergies. Social History:   TOBACCO:   reports that he quit smoking about 21 years ago. His smoking use included cigarettes. He started smoking about 52 years ago. He quit after 40.00 years of use. He has never used smokeless tobacco.  ETOH:   reports no history of alcohol use.       Family History:       Problem Relation Age of Onset    Coronary Art Dis Mother     Coronary Art Dis Sister     Colon Cancer Brother     Coronary Art Dis Brother        REVIEW OF SYSTEMS:  Ten systems reviewed and negative except for stated in HPI    Physical Exam:    Vitals: /77   Pulse 75   Temp 98.2 °F right hemidiaphragm is elevated. Degenerative changes are seen at multiple levels in the spine. No evidence of acute cardiopulmonary process    CT HEAD WO CONTRAST    Result Date: 9/23/2021  EXAMINATION:  CT HEAD WO CONTRAST HISTORY:   change in mental status  TECHNIQUE: CT head without contrast. COMPARISON:  1/19/2021 CT brain RESULT: Post-operative change:  None. Acute change:   No evidence of an acute intracranial process. Hemorrhage:    No evidence of acute intracranial hemorrhage. Mass Lesion / Mass Effect:   No evidence of an intracranial mass or extraaxial fluid collection. No significant mass effect. Chronic change:   Patchy foci of  low attenuation coefficient are present within the supratentorial white matter which is a nonspecific finding but likely represents moderate microvascular ischemia. Atherosclerotic calcification of the carotid siphons. Parenchyma:  Mild generalized volume loss. Ventricles:   Ventricular enlargement concordant with the degree of parenchymal volume loss. Other: The calvarium, skull base, mastoids, orbits and extracranial soft tissues are unremarkable. Stable near complete opacification of the right maxillary sinus with hyperdense material in situ and surrounding periosteal reaction likely secondary to chronic sinusitis. No acute intracranial abnormality. Sequela of moderate chronic microvascular ischemia. Assessment and Plan     *Altered mental status. As described above. No fever, no neck rigidity, no overwhelming clinical evidence of meningitis however patient has a history of aseptic meningitis about 9 months ago. Patient was treated with acyclovir. CT scan is negative for acute pathology. Lumbar puncture needed to be done in the emergency room to exclude recurrent meningitis. CSF came back negative for Gram stain and only 1 WBC. Rule out other possible etiologies. I requested MRI of the brain. No upper and lower extremities weakness.   No ataxia. His speech is clear but slow. Patient is on Aricept by Dr. Bean Staff. Suspect progression of microvascular cerebral brain disease. *Sinusitis. Incidental finding on CT. *HTN. *Diabetes. *Stage III kidney failure. *Hyperlipidemia    Plan:  As stated above I requested LP to be done to exclude the possibility of recurrent meningitis. Only 1 WBC in CSF makes it unlikely that the patient has active inflammation. I requested MRI of the brain. This has been going on for a few weeks. Continue aspirin. Continue statin. Check lipid profile. Antibiotic for sinusitis. Continue his preadmission home medications. Patient status is  dynamic and evolutionary therefore the aforementioned assessment and plan may or may not be complete or conclusive. Patient will likely need to have additional work-up, investigation and therapeutic intervention. Neuro consultation to evaluate his case and the provide me with further advice and recommendations. Defer further needed that work-up, investigation and therapeutic intervention and the timing of these interventions to neurology team given their expertise.           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    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    Diabetes mellitus ([address] E11.9    Wheezing R06.2    Proctitis K62.89    AMS (altered mental status) R41.82    Hyperlipidemia E78.5    Hypothyroid E03.9    Hypertension I10  CKD (chronic kidney disease) N18.9    Viral encephalitis A86    Chest pain R07.9    Mild cognitive disorder F09    REM behavioral disorder G47.52    Altered mental status R41.82       Romelia Masterson MD, MD  Admitting Hospitalist    TTS: 85mins where I focused more than 75% of my attention on rendering care, and planning treatment course for this patient, in addition to talking to RN team, mid levels, consulting with other physicians and following up on labs and imaging. High Risk Readmission Screening Tool Score Noted.      Emergency Contact: 37.3

## 2021-09-24 NOTE — PROGRESS NOTES
Pt A&O to person, place, and situation. MRI form reviewed with pt. Medications administered per mar. Pt resting quietly in bed with no distress. Pts wife reports intermittent confusion at home which seemed to be increasing the last few days. 1330- Report called to [facility]. Pt left unit via cot with [facility] for transport to MRI. 1630- Pt returned from MRI. Resting quietly in bed, denies needs. Pts wife updated about pts return.

## 2021-09-24 NOTE — PROGRESS NOTES
Pt admitted to the unit via w/c. Pt is A&O to name and familiar faces with short term memory loss. Pleasant and cooperative with staff and care. Pt oriented on call light and is able to use call light to make needs known. Skin assessed and skin is intact and shows no s/s of pressure trauma. No further complaints voiced. Call light within reach. Safety maintained.

## 2021-09-24 NOTE — ED NOTES
Dr. Jimmy Swanson returned page to Dr. Josue Combs who accepts the patient.       Godwin Najera RN  09/24/21 0018

## 2021-09-25 PROBLEM — G93.40 ENCEPHALOPATHY: Status: ACTIVE | Noted: 2021-09-25

## 2021-09-25 LAB
ALBUMIN SERPL-MCNC: 3.9 G/DL (ref 3.5–4.6)
ALBUMIN SERPL-MCNC: 4 G/DL (ref 3.5–4.6)
ALP BLD-CCNC: 77 U/L (ref 35–104)
ALP BLD-CCNC: 78 U/L (ref 35–104)
ALT SERPL-CCNC: 15 U/L (ref 0–41)
ALT SERPL-CCNC: 17 U/L (ref 0–41)
AMMONIA: 28 UMOL/L (ref 16–60)
ANION GAP SERPL CALCULATED.3IONS-SCNC: 14 MEQ/L (ref 9–15)
AST SERPL-CCNC: 18 U/L (ref 0–40)
AST SERPL-CCNC: 19 U/L (ref 0–40)
BILIRUB SERPL-MCNC: 0.7 MG/DL (ref 0.2–0.7)
BILIRUB SERPL-MCNC: 0.7 MG/DL (ref 0.2–0.7)
BILIRUBIN DIRECT: <0.2 MG/DL (ref 0–0.4)
BILIRUBIN, INDIRECT: NORMAL MG/DL (ref 0–0.6)
BUN BLDV-MCNC: 31 MG/DL (ref 8–23)
C-REACTIVE PROTEIN, HIGH SENSITIVITY: 6.4 MG/L (ref 0–5)
CALCIUM SERPL-MCNC: 9.6 MG/DL (ref 8.5–9.9)
CHLORIDE BLD-SCNC: 101 MEQ/L (ref 95–107)
CHOLESTEROL, TOTAL: 149 MG/DL (ref 0–199)
CO2: 20 MEQ/L (ref 20–31)
CREAT SERPL-MCNC: 1.78 MG/DL (ref 0.7–1.2)
FOLATE: 18.2 NG/ML (ref 7.3–26.1)
GFR AFRICAN AMERICAN: 45.9
GFR NON-AFRICAN AMERICAN: 38
GLOBULIN: 3.1 G/DL (ref 2.3–3.5)
GLUCOSE BLD-MCNC: 112 MG/DL (ref 60–115)
GLUCOSE BLD-MCNC: 113 MG/DL (ref 60–115)
GLUCOSE BLD-MCNC: 244 MG/DL (ref 60–115)
GLUCOSE BLD-MCNC: 278 MG/DL (ref 70–99)
HCT VFR BLD CALC: 41.5 % (ref 42–52)
HDLC SERPL-MCNC: 43 MG/DL (ref 40–59)
HEMOGLOBIN: 14.2 G/DL (ref 13.7–17.5)
HOMOCYSTEINE: 15.9 UMOL/L (ref 0–15)
LDL CHOLESTEROL CALCULATED: 79 MG/DL (ref 0–129)
MCH RBC QN AUTO: 28.7 PG (ref 25.7–32.2)
MCHC RBC AUTO-ENTMCNC: 34.2 % (ref 32.3–36.5)
MCV RBC AUTO: 84 FL (ref 79–92.2)
PDW BLD-RTO: 15.3 % (ref 11.6–14.4)
PERFORMED ON: ABNORMAL
PERFORMED ON: NORMAL
PERFORMED ON: NORMAL
PLATELET # BLD: 135 K/UL (ref 163–337)
POTASSIUM REFLEX MAGNESIUM: 4.5 MEQ/L (ref 3.4–4.9)
RBC # BLD: 4.94 M/UL (ref 4.63–6.08)
SEDIMENTATION RATE, ERYTHROCYTE: 2 MM (ref 0–20)
SODIUM BLD-SCNC: 135 MEQ/L (ref 135–144)
TOTAL PROTEIN: 7 G/DL (ref 6.3–8)
TOTAL PROTEIN: 7 G/DL (ref 6.3–8)
TRIGL SERPL-MCNC: 135 MG/DL (ref 0–150)
TSH REFLEX: 2.35 UIU/ML (ref 0.44–3.86)
VITAMIN B-12: 454 PG/ML (ref 232–1245)
WBC # BLD: 8.6 K/UL (ref 4.2–9)

## 2021-09-25 PROCEDURE — 86141 C-REACTIVE PROTEIN HS: CPT

## 2021-09-25 PROCEDURE — 80061 LIPID PANEL: CPT

## 2021-09-25 PROCEDURE — G0378 HOSPITAL OBSERVATION PER HR: HCPCS

## 2021-09-25 PROCEDURE — 36415 COLL VENOUS BLD VENIPUNCTURE: CPT

## 2021-09-25 PROCEDURE — 6370000000 HC RX 637 (ALT 250 FOR IP): Performed by: INTERNAL MEDICINE

## 2021-09-25 PROCEDURE — 84443 ASSAY THYROID STIM HORMONE: CPT

## 2021-09-25 PROCEDURE — 86618 LYME DISEASE ANTIBODY: CPT

## 2021-09-25 PROCEDURE — 83090 ASSAY OF HOMOCYSTEINE: CPT

## 2021-09-25 PROCEDURE — 6370000000 HC RX 637 (ALT 250 FOR IP): Performed by: PSYCHIATRY & NEUROLOGY

## 2021-09-25 PROCEDURE — 6360000002 HC RX W HCPCS: Performed by: INTERNAL MEDICINE

## 2021-09-25 PROCEDURE — 82140 ASSAY OF AMMONIA: CPT

## 2021-09-25 PROCEDURE — 86592 SYPHILIS TEST NON-TREP QUAL: CPT

## 2021-09-25 PROCEDURE — 1210000000 HC MED SURG R&B

## 2021-09-25 PROCEDURE — 80053 COMPREHEN METABOLIC PANEL: CPT

## 2021-09-25 PROCEDURE — 85652 RBC SED RATE AUTOMATED: CPT

## 2021-09-25 PROCEDURE — 82607 VITAMIN B-12: CPT

## 2021-09-25 PROCEDURE — 96372 THER/PROPH/DIAG INJ SC/IM: CPT

## 2021-09-25 PROCEDURE — 85027 COMPLETE CBC AUTOMATED: CPT

## 2021-09-25 PROCEDURE — 83921 ORGANIC ACID SINGLE QUANT: CPT

## 2021-09-25 PROCEDURE — 92523 SPEECH SOUND LANG COMPREHEN: CPT

## 2021-09-25 PROCEDURE — 82746 ASSAY OF FOLIC ACID SERUM: CPT

## 2021-09-25 PROCEDURE — 94640 AIRWAY INHALATION TREATMENT: CPT

## 2021-09-25 PROCEDURE — 2580000003 HC RX 258: Performed by: INTERNAL MEDICINE

## 2021-09-25 PROCEDURE — 96366 THER/PROPH/DIAG IV INF ADDON: CPT

## 2021-09-25 RX ADMIN — AZITHROMYCIN MONOHYDRATE 500 MG: 500 INJECTION, POWDER, LYOPHILIZED, FOR SOLUTION INTRAVENOUS at 10:55

## 2021-09-25 RX ADMIN — GLIPIZIDE 2.5 MG: 5 TABLET ORAL at 17:04

## 2021-09-25 RX ADMIN — BUDESONIDE AND FORMOTEROL FUMARATE DIHYDRATE 2 PUFF: 160; 4.5 AEROSOL RESPIRATORY (INHALATION) at 06:09

## 2021-09-25 RX ADMIN — BUDESONIDE AND FORMOTEROL FUMARATE DIHYDRATE 2 PUFF: 160; 4.5 AEROSOL RESPIRATORY (INHALATION) at 18:23

## 2021-09-25 RX ADMIN — Medication 10 ML: at 10:55

## 2021-09-25 RX ADMIN — BRIMONIDINE TARTRATE 1 DROP: 2 SOLUTION OPHTHALMIC at 09:47

## 2021-09-25 RX ADMIN — METOPROLOL TARTRATE 25 MG: 25 TABLET, FILM COATED ORAL at 09:46

## 2021-09-25 RX ADMIN — METOPROLOL TARTRATE 25 MG: 25 TABLET, FILM COATED ORAL at 21:55

## 2021-09-25 RX ADMIN — ASPIRIN 325 MG: 325 TABLET, COATED ORAL at 09:48

## 2021-09-25 RX ADMIN — DONEPEZIL HYDROCHLORIDE 10 MG: 5 TABLET, FILM COATED ORAL at 21:55

## 2021-09-25 RX ADMIN — BRIMONIDINE TARTRATE 1 DROP: 2 SOLUTION OPHTHALMIC at 21:55

## 2021-09-25 RX ADMIN — TRAMADOL HYDROCHLORIDE 50 MG: 50 TABLET, FILM COATED ORAL at 21:59

## 2021-09-25 RX ADMIN — TRAMADOL HYDROCHLORIDE 50 MG: 50 TABLET, FILM COATED ORAL at 10:54

## 2021-09-25 RX ADMIN — ATORVASTATIN CALCIUM 20 MG: 10 TABLET, FILM COATED ORAL at 21:55

## 2021-09-25 RX ADMIN — ENOXAPARIN SODIUM 30 MG: 30 INJECTION SUBCUTANEOUS at 09:46

## 2021-09-25 RX ADMIN — CLONAZEPAM 1 MG: 1 TABLET ORAL at 21:55

## 2021-09-25 RX ADMIN — LEVOTHYROXINE SODIUM 100 MCG: 0.1 TABLET ORAL at 06:34

## 2021-09-25 RX ADMIN — ALLOPURINOL 200 MG: 100 TABLET ORAL at 09:46

## 2021-09-25 RX ADMIN — GLIPIZIDE 2.5 MG: 5 TABLET ORAL at 09:46

## 2021-09-25 ASSESSMENT — PAIN SCALES - GENERAL
PAINLEVEL_OUTOF10: 5
PAINLEVEL_OUTOF10: 0
PAINLEVEL_OUTOF10: 7
PAINLEVEL_OUTOF10: 4
PAINLEVEL_OUTOF10: 0

## 2021-09-25 ASSESSMENT — PAIN DESCRIPTION - LOCATION: LOCATION: HEAD

## 2021-09-25 ASSESSMENT — PAIN DESCRIPTION - DESCRIPTORS: DESCRIPTORS: ACHING

## 2021-09-25 ASSESSMENT — PAIN DESCRIPTION - PAIN TYPE: TYPE: ACUTE PAIN

## 2021-09-25 NOTE — PROGRESS NOTES
Speech Language Pathology  Facility/Department: [facility] MED SURG UNIT  Initial Speech/Language/Cognitive Assessment    NAME: Leslye Hall  : 1950   MRN: [id number]  ADMISSION DATE: 2021  ADMITTING DIAGNOSIS: has YANG (obstructive sleep apnea); Borderline diabetes; Asthma-chronic obstructive pulmonary disease overlap syndrome ([address] Hyperopia of both eyes with astigmatism and presbyopia; Personal history of tobacco use, presenting hazards to health; Primary open angle glaucoma (POAG) of left eye, moderate stage; Pseudophakia of both eyes; S/P laser iridotomy; Secondary glaucoma of right eye; Secondary optic atrophy of right eye; Traumatic mydriasis; Diabetes mellitus ([facility] [address] Wheezing; Proctitis; AMS (altered mental status); Hyperlipidemia; Hypothyroid; Hypertension; CKD (chronic kidney disease); Viral encephalitis; Chest pain; Mild cognitive disorder; REM behavioral disorder;  Altered mental status; and Encephalopathy on their problem list.  DATE ONSET:     Date of Eval: 2021   Evaluating Therapist: Radha Erwin, SLP    RECENT RESULTS  CT OF HEAD/MRI:sinusitis    Primary Complaint: cognitive decline    Pain:  Pain Assessment  Pain Assessment: 0-10  Pain Level: 0  Cabello-Baker Pain Rating: No hurt  Patient's Stated Pain Goal: No pain  Pain Type: Acute pain  Pain Location: Head, Chest  Pain Orientation: Anterior  Pain Radiating Towards: \"my eyeballs and cheeks\"  Pain Descriptors: Aching  Pain Frequency: Intermittent (on and off over the last 2-3 days)  Clinical Progression: Not changed  Functional Pain Assessment: Prevents or interferes some active activities and ADLs  Non-Pharmaceutical Pain Intervention(s): Repositioned  Response to Pain Intervention: Patient Satisfied  Multiple Pain Sites: No    Assessment:  Cognitive Diagnosis: mild/mod cognitive impairment    MMSE     Recommendations:  Requires SLP Intervention: Yes  Duration/Frequency of Treatment: 1-2 x/wk          Plan: Goals:  Short-term Goals  Timeframe for Short-term Goals: 1-2 wks  Goal 1: Will complete working memory tasks in 80% of opportunities and occ verbal cues  Goal 2: Will immediately recall 2-3 items of functional information presented verbally in 80% of opportunities  Long-term Goals  Timeframe for Long-term Goals: 1-2 wks  Goal 1: Will demonstrate cognitive-linguistic skills for safety in therapy and at home in 80% of opportunities and min/no verbal cues   Patient/family involved in developing goals and treatment plan: yes    Subjective:   Previous level of function and limitations: wfl  General  Chart Reviewed: Yes  Patient assessed for rehabilitation services?: Yes  Family / Caregiver Present: No  General Comment  Comments: slight stutter  Subjective  Subjective: calm, cooperative     Vision  Vision: Impaired  Vision Exceptions: Wears glasses at all times  Hearing  Hearing: Within functional limits           Objective:     Oral/Motor  Oral Motor: Within functional limits    Auditory Comprehension  Comprehension: Within Functional Limits         Expression  Primary Mode of Expression: Verbal    Verbal Expression  Verbal Expression: Within functional limits    Written Expression  Dominant Hand: Right  Written Expression: [facility] to [facility]  Self formulation Impairment Severity: Sentence    Motor Speech  Motor Speech: Exceptions to [facility]  Intelligibility: Mild    Pragmatics/Social Functioning  Pragmatics: Within functional limits    Cognition:      Orientation  Overall Orientation Status: Within Functional Limits  Attention  Attention: Exceptions to [facility]  Sustained Attention: Mild  Memory  Memory: Exceptions to [facility]  Prospective Memory: Moderate  Short-term Memory: Moderate  Working Memory: Severe  Problem Solving  Problem Solving: Exceptions to [facility]  Verbal Reasoning Skills:  Moderate  Numeric Reasoning  Numeric Reasoning: Exceptions to [facility]   Calculations: Severe  Abstract Reasoning  Abstract Reasoning: Exceptions to WFL    Additional Assessments:   poor episodic memory and delayed recall  Difficulty with working memory and concentration tasks  Limit literacy          Prognosis:  Speech Therapy Prognosis  Prognosis: Good  Individuals consulted  Consulted and agree with results and recommendations: Patient    Education:  Patient Education: Review use of call light, POC  Patient Education Response: Verbalizes understanding  Safety Devices in place: Yes  Type of devices: Left in bed;Call light within reach    Therapy Time:   Individual Concurrent Group Co-treatment   Time In 0940         Time Out 1025         Minutes 20 [place], [place]  9/25/2021 10:23 AM

## 2021-09-25 NOTE — PROGRESS NOTES
Patient is feeling better today. He said that his headache is gone. He feels that he does not have any fogginess in his brain. He believes that he is a speech is improving and fluency. No fever or chills. No chest pain or palpitation. No abdominal pain, nausea or vomiting. ROS: 12 system review otherwise is negative for acute signs or symptoms over the last 24 hrs. General appearance: alert, appears stated age and cooperative, no apparent distress. Skin: Skin color, texture, turgor normal. No rashes or lesions  HEENT: Head: Normocephalic, no lesions, without obvious abnormality. Neck: no adenopathy, no carotid bruit, no JVD, supple, symmetrical, trachea midline and thyroid not enlarged, symmetric, no tenderness/mass/nodules  Lungs: clear to auscultation bilaterally  Heart: regular rate and rhythm, S1, S2 normal, no murmur, click, rub or gallop  Abdomen: soft, non-tender; bowel sounds normal; no masses,  no organomegaly  Extremities: extremities normal, atraumatic, no cyanosis or edema  Neurologic: Mental status: Patient is awake. Is oriented to place, person and location. He is coherent. He is able to answer questions. He is able to engage in conversation back-and-forth. He is speech is at a faster pace compared to yesterday. No delusion.     Assessment and plan:     *Subacute, chronic progression of his confusion, fogginess, speech fluency that have been going on for the last several weeks. Work-up thus far is negative. Only WBC in CSF. Culture is negative. MRI is negative. Defer further needed work-up, investigation and management relative to his neurological status to neurology team given his superior expertise. *Sinusitis. Continue antibiotic. *HTN.     *Diabetes.     *Stage III kidney failure. Near baseline.     *Hyperlipidemia     Continue current treatment plan. Defer further needed investigation and treatment to neurology team relative to his neurological state. Patient is near baseline state. Discharge patient home once cleared by neurology team.      I may or may not have addressed all of this pt symptoms, medical issues, abnormal labs and findings. Pt will need additional work up, investigation, testing, surveillance, and treatment to be done at a later time and date during this hospitalization or post discharge by PCP and other out pt providers.

## 2021-09-25 NOTE — CONSULTS
ID - The patient is a 80y year old, right-handed male. Consultation requested by Dr. Fred Tobar. The history was obtained from  the patient and available records. Progress notes, x-rays, lab results, and other inpatient notes were reviewed. CC -- Headache, Encephalopathy -- HPI -- Headaches increasing over 1.5 weeks. Also behaving differently e.g. putting his  hand on the couch, talking, as if someone were there. Another time could not remember how to turn on the stove. Another,  thought he saw snakes on the countertop. Nightmares more frequent recently; also since viral meningitis last year, tx with acyclovir. Was placed on  clonazepam, not taking consistnetly. Short-term memory loss since prior to the meningitis. Pt not able to give much history; very vague, rambling. Primarily sees Dr. Jennifer Rodriguez. On clonazepam 1 hs, donepezil 5 hs,  IMAGING: CT brain rev'd - apparent very extensive WMD  MR brain rev'd - moderate WMD, perhaps 50+ small WM lesions, confluent in post watershed divide areas. SIGNIFICANT LABS: CSF - 2R 1W . Other labs in chart reviewed. PMH -- hypertension, hyperlipidaemia, chronic obstructive pulmonary disease, diabetes mellitus, raenal failure III  PSH -- hand surgery R, thyroidectomy  Aller -- none  Meds -- see reconciliation sheet. FHx -- CAD, colon CA  SHx -- No tob No EtOH  ROS -- Negatives: malaise rash headache dizziness diplopia nausea ataxia angina palpitations cough vomiting incontinence dysuria  arthralgias weight gain anorexia alopecia nystagmus drowsiness tremor memory difficulty Positives: none. Also see HPI for elements of this section, particularly PMH, allergies, FH, ROS, documented therein. Physical Examination --  The patient is well groomed, well developed, and in no acute distress. Vital signs: reviewed as per the graphic sheet. Skin: examination demonstrated no evident lesions. HEENT: bruits absent (carotid and supraclavicular). No nuchal rigidity.  Bhavna's, Brudzinski's neg  Heart: cardiac rhythm regular, with no murmur. Lungs: breath sounds normal throughout. .  Abdomen: grossly normal.  Extremities: no clubbing, cyanosis, or edema. Neurological Examination --  Mental status: The patient is alert. Orientation is to person, place, and time. Speech tangential, detail given tends to be  inconsequential  CNN: Pupils are equal, 4 mm OU, round, and normally reactive to light and accommodation, both directly and consensually. Visual fields are full to confrontation. Ptosis is absent. Extra-ocular movements are full. Nystagmus is not observed. Masseters are of normal strength. Facial movement is normal. Hearing is grossly normal. The gag reflex  is strong bilaterally. Sternocleidomastoids and trapezii are of normal strength. The tongue in the midline. Dysarthria,  which may be baseline for pt. Motor: Muscle testing including , finger abductors, biceps, triceps, deltoid, toe flexors and extensors, tibialis anterior,  triceps surae, quadriceps femoris, biceps femoris, and iliopsoases was normal. Tone is normal. Muscle bulk is normal.  Fasciculations are not seen. Pronator drift was not evident. Sensory: Sensation to to touch, temperature, and vibration was normal in the arms. Distal loss in LEs. Reflexes: biceps triceps brachioradialis patellar Achilles plantar  R 1+ 1+ 1+ 0 0 flexor  L 1+ 1+ 1+ 0 0 flexor  Coordination: Dysmetria and dysdiadochokinesia are absent. Tremor is absent; dystonia is absent; chorea is absent. Gait: Station and gait are mildly wide-based. Musculoskeletal: Scoliosis and lordosis are not evident. Impression  Encephalopathy of uncertain aetiology. Comment Pt appears to be better than ER description would suggest. Sensorium has been clear for nursing all day today. There is no suggestion of HSV encephalitis per MR images. Recommendations and Patient Instructions --  1. Additional serum labs (vasculitis) and CSF labs.   2. Would try incr donepezil to 10, possibly further on an outpt basis.

## 2021-09-25 NOTE — PLAN OF CARE
Problem: Confusion - Acute:  Goal: Absence of continued neurological deterioration signs and symptoms  Description: Absence of continued neurological deterioration signs and symptoms  Outcome: Met This Shift  Goal: Mental status will be restored to baseline  Description: Mental status will be restored to baseline  Outcome: Met This Shift     Problem: Discharge Planning:  Goal: Ability to perform activities of daily living will improve  Description: Ability to perform activities of daily living will improve  Outcome: Ongoing  Goal: Participates in care planning  Description: Participates in care planning  Outcome: Ongoing     Problem: Injury - Risk of, Physical Injury:  Goal: Absence of physical injury  Description: Absence of physical injury  Outcome: Met This Shift  Goal: Will remain free from falls  Description: Will remain free from falls  Outcome: Met This Shift     Problem: Mood - Altered:  Goal: Mood stable  Description: Mood stable  Outcome: Met This Shift  Goal: Absence of abusive behavior  Description: Absence of abusive behavior  Outcome: Met This Shift  Goal: Verbalizations of feeling emotionally comfortable while being cared for will increase  Description: Verbalizations of feeling emotionally comfortable while being cared for will increase  Outcome: Met This Shift     Problem: Mood - Altered:  Goal: Mood stable  Description: Mood stable  Outcome: Met This Shift  Goal: Absence of abusive behavior  Description: Absence of abusive behavior  Outcome: Met This Shift  Goal: Verbalizations of feeling emotionally comfortable while being cared for will increase  Description: Verbalizations of feeling emotionally comfortable while being cared for will increase  Outcome: Met This Shift     Problem: Psychomotor Activity - Altered:  Goal: Absence of psychomotor disturbance signs and symptoms  Description: Absence of psychomotor disturbance signs and symptoms  Outcome: Met This Shift     Problem: Sensory Perception - Impaired:  Goal: Demonstrations of improved sensory functioning will increase  Description: Demonstrations of improved sensory functioning will increase  Outcome: Met This Shift  Goal: Decrease in sensory misperception frequency  Description: Decrease in sensory misperception frequency  Outcome: Met This Shift  Goal: Able to refrain from responding to false sensory perceptions  Description: Able to refrain from responding to false sensory perceptions  Outcome: Met This Shift  Goal: Demonstrates accurate environmental perceptions  Description: Demonstrates accurate environmental perceptions  Outcome: Met This Shift  Goal: Able to distinguish between reality-based and nonreality-based thinking  Description: Able to distinguish between reality-based and nonreality-based thinking  Outcome: Met This Shift  Goal: Able to interrupt nonreality-based thinking  Description: Able to interrupt nonreality-based thinking  Outcome: Met This Shift     Problem: Sleep Pattern Disturbance:  Goal: Appears well-rested  Description: Appears well-rested  Outcome: Met This Shift     Problem: Pain:  Description: Pain management should include both nonpharmacologic and pharmacologic interventions.   Goal: Pain level will decrease  Description: Pain level will decrease  Outcome: Met This Shift  Goal: Control of acute pain  Description: Control of acute pain  Outcome: Met This Shift  Goal: Control of chronic pain  Description: Control of chronic pain  Outcome: Met This Shift     Problem: Falls - Risk of:  Goal: Absence of physical injury  Description: Absence of physical injury  Outcome: Met This Shift  Goal: Will remain free from falls  Description: Will remain free from falls  Outcome: Met This Shift

## 2021-09-26 VITALS
SYSTOLIC BLOOD PRESSURE: 119 MMHG | RESPIRATION RATE: 18 BRPM | WEIGHT: 220 LBS | TEMPERATURE: 98.3 F | DIASTOLIC BLOOD PRESSURE: 71 MMHG | HEIGHT: 78 IN | OXYGEN SATURATION: 95 % | HEART RATE: 80 BPM | BODY MASS INDEX: 25.45 KG/M2

## 2021-09-26 LAB
GLUCOSE BLD-MCNC: 147 MG/DL (ref 60–115)
PERFORMED ON: ABNORMAL

## 2021-09-26 PROCEDURE — 6360000002 HC RX W HCPCS: Performed by: INTERNAL MEDICINE

## 2021-09-26 PROCEDURE — 94640 AIRWAY INHALATION TREATMENT: CPT

## 2021-09-26 PROCEDURE — G0378 HOSPITAL OBSERVATION PER HR: HCPCS

## 2021-09-26 PROCEDURE — 96372 THER/PROPH/DIAG INJ SC/IM: CPT

## 2021-09-26 PROCEDURE — 6370000000 HC RX 637 (ALT 250 FOR IP): Performed by: INTERNAL MEDICINE

## 2021-09-26 RX ORDER — DOXYCYCLINE HYCLATE 100 MG
100 TABLET ORAL 2 TIMES DAILY
Qty: 14 TABLET | Refills: 0 | Status: SHIPPED | OUTPATIENT
Start: 2021-09-26 | End: 2021-10-03

## 2021-09-26 RX ORDER — CYANOCOBALAMIN 1000 UG/ML
1000 INJECTION INTRAMUSCULAR; SUBCUTANEOUS ONCE
Status: COMPLETED | OUTPATIENT
Start: 2021-09-26 | End: 2021-09-26

## 2021-09-26 RX ORDER — GLIMEPIRIDE 2 MG/1
2 TABLET ORAL EVERY MORNING
Qty: 30 TABLET | Refills: 1 | Status: SHIPPED | OUTPATIENT
Start: 2021-09-26 | End: 2022-03-08

## 2021-09-26 RX ORDER — DONEPEZIL HYDROCHLORIDE 5 MG/1
10 TABLET, FILM COATED ORAL NIGHTLY
Qty: 30 TABLET | Refills: 3 | Status: SHIPPED | OUTPATIENT
Start: 2021-09-26 | End: 2021-12-13

## 2021-09-26 RX ADMIN — GLIPIZIDE 2.5 MG: 5 TABLET ORAL at 08:44

## 2021-09-26 RX ADMIN — ASPIRIN 325 MG: 325 TABLET, COATED ORAL at 08:47

## 2021-09-26 RX ADMIN — ALLOPURINOL 200 MG: 100 TABLET ORAL at 08:45

## 2021-09-26 RX ADMIN — METOPROLOL TARTRATE 25 MG: 25 TABLET, FILM COATED ORAL at 08:45

## 2021-09-26 RX ADMIN — BUDESONIDE AND FORMOTEROL FUMARATE DIHYDRATE 2 PUFF: 160; 4.5 AEROSOL RESPIRATORY (INHALATION) at 05:09

## 2021-09-26 RX ADMIN — CYANOCOBALAMIN 1000 MCG: 1000 INJECTION, SOLUTION INTRAMUSCULAR at 12:26

## 2021-09-26 RX ADMIN — ENOXAPARIN SODIUM 30 MG: 30 INJECTION SUBCUTANEOUS at 08:44

## 2021-09-26 RX ADMIN — BRIMONIDINE TARTRATE 1 DROP: 2 SOLUTION OPHTHALMIC at 08:47

## 2021-09-26 RX ADMIN — TRAMADOL HYDROCHLORIDE 50 MG: 50 TABLET, FILM COATED ORAL at 12:36

## 2021-09-26 RX ADMIN — LEVOTHYROXINE SODIUM 100 MCG: 0.1 TABLET ORAL at 05:37

## 2021-09-26 ASSESSMENT — PAIN SCALES - GENERAL
PAINLEVEL_OUTOF10: 0
PAINLEVEL_OUTOF10: 3
PAINLEVEL_OUTOF10: 5

## 2021-09-26 ASSESSMENT — PAIN DESCRIPTION - LOCATION: LOCATION: HEAD

## 2021-09-26 ASSESSMENT — PAIN DESCRIPTION - DESCRIPTORS: DESCRIPTORS: ACHING;DISCOMFORT

## 2021-09-26 NOTE — DISCHARGE SUMMARY
PCP and neurology as well as ENT for sinusitis. I answered all of her questions. Patient and his wife are very satisfied with the care thus far. They are comfortable going home and a complete monitoring in the outpatient setting in collaboration with his PCP and neurologist.    I do not have clear or strong clinical justification to extend inpatient hospitalization. Patient however would definitely need and require close and frequent monitoring as well as additional work-up, investigation and therapeutic intervention that potentially could take place in the outpatient setting by primary care doctor in collaboration with other specialists. Hospital Course:   Gordo Solares is a 80 y.o. male that was admitted and treated at [facility] for the aforementioned medical conditions. His symptoms have resolved back to baseline state which is consistent with subtle degree of cognitive and memory loss as well as intermittent hallucination. No clear indication to extend his inpatient hospitalization. Patient will be discharged home to complete work-up if needed in the outpatient setting and monitor his condition by his wife, PCP and neurology. General appearance: alert, appears stated age and cooperative, no apparent distress. Skin: Skin color, texture, turgor normal. No rashes or lesions  HEENT: Head: Normocephalic, no lesions, without obvious abnormality. Neck: no adenopathy, no carotid bruit, no JVD, supple, symmetrical, trachea midline and thyroid not enlarged, symmetric, no tenderness/mass/nodules  Lungs: clear to auscultation bilaterally  Heart: regular rate and rhythm, S1, S2 normal, no murmur, click, rub or gallop  Abdomen: soft, non-tender; bowel sounds normal; no masses,  no organomegaly  Extremities: extremities normal, atraumatic, no cyanosis or edema  Neurologic: Mental status: Patient is awake.  Is oriented to place, person and location.    He is coherent. He is able to answer questions.   He is able to engage in conversation back-and-forth. Patient was seen by the following consultants while admitted to [facility]:   Consults:  [facility]    Significant Diagnostic Studies:    MRI BRAIN W WO CONTRAST    Result Date: 9/25/2021  EXAMINATION:  MRI BRAIN W WO CONTRAST HISTORY:   Altered mental status   history of viral meningitis. Memory loss. TECHNIQUE:  Routine brain MRI protocol without and with contrast including diffusion images. CONTRAST:  20 mL gadoteridol (PROHANCE) injection COMPARISON: MRI brain 5/19/2021. CT head 9/23/2021. RESULT: BRAIN: Acute Change: There is no evidence of restricted diffusion to suggest an acute infarct. Hemorrhage:    Unchanged areas of susceptibility on the S1 sequence Mass Lesion/ Mass Effect:    No evidence of an intracranial mass or extra-axial fluid collection. No abnormal parenchymal or leptomeningeal enhancement following contrast administration. No significant mass effect. Chronic Change:  Scattered patchy and confluent areas of increased T2 and FLAIR signal are present in the supratentorial white matter which is nonspecific but likely represents chronic microvascular ischemia. Parenchyma: There is mild generalized parenchymal volume loss. The brain parenchyma is otherwise within normal limits of signal intensity and morphology. Ventricles:     Ventriculomegaly corresponds to the degree of parenchymal volume loss. Skull Base:    Hypothalamic and pituitary region are grossly normal.   Craniocervical junction is normal. No significant marrow replacement process. Vasculature:    Major intracranial arterial structures, and dural venous sinuses show typical flow void, suggesting patency. Other:  Unchanged appearance of the right maxillary sinus likely representing chronic sinusitis. Mastoid air cells appear clear. Orbits and extracranial soft tissues are unremarkable.      No acute intracranial process or significant change from prior.       Discharge Medications:       Cindy Ramesh   Home Medication Instructions ALB:[id number]    Printed on:09/26/21 2860   Medication Information                      allopurinol (ZYLOPRIM) 300 MG tablet               ammonium lactate (AMLACTIN) 12 % cream  Apply topically as needed for Dry Skin Apply topically as needed. aspirin 325 MG EC tablet  Take 1 tablet by mouth daily             brimonidine (ALPHAGAN) 0.2 % ophthalmic solution  Place 1 drop into both eyes 2 times daily              budesonide-formoterol (SYMBICORT) 160-4.5 MCG/ACT AERO  Inhale 2 puffs into the lungs 2 times daily             clonazePAM (KLONOPIN) 2 MG tablet  Take 2 mg by mouth nightly. donepezil (ARICEPT) 5 MG tablet  Take 2 tablets by mouth nightly             doxycycline hyclate (VIBRA-TABS) 100 MG tablet  Take 1 tablet by mouth 2 times daily for 7 days             glimepiride (AMARYL) 2 MG tablet  Take 1 tablet by mouth every morning Do not take if your blood sugar drops below 125             levalbuterol (XOPENEX HFA) 45 MCG/ACT inhaler  Inhale 2 puffs into the lungs every 4 hours as needed for Wheezing             levothyroxine (SYNTHROID) 100 MCG tablet  Take 100 mcg by mouth Daily              lovastatin (MEVACOR) 40 MG tablet  Take 40 mg by mouth daily              metoprolol tartrate (LOPRESSOR) 25 MG tablet  Take 1 tablet by mouth 2 times daily             Respiratory Therapy Supplies SOURAV  Change CPAP pressure to 6 cm   New CPAP mask and supplies             traMADol (ULTRAM) 50 MG tablet  Take 50 mg by mouth every 6 hours as needed for Pain. Disposition:   Discharged to Home. Any C needs that were indicated and/or required as been addressed and set up by Social Work. Condition at discharge: Pt was medically stable at the time of discharge.  Significant improvement in clinical condition compared to initial condition at presentation to hospital    Activity: activity as tolerated, fall precautions. Total time taken for discharging this patient: 40 minutes. Greater than 70% of time was spent focused exclusively on this patient. Time was taken to review chart, discuss plans with consultants, reconciling medications, discussing plan answering questions with patient. Devin Jorgensen MD  9/26/2021, 10:45 AM  ----------------------------------------------------------------------------------------------------------------------    Bridget Lopez,     Please return to ER or call 911 if you develop any significant signs or symptoms. I may not have addressed all of your medical illnesses or the abnormal blood work or imaging therefore please ask your PCP Giorgio Aguilar MD and other out patient specialists and providers  to obtain [facility] entirely to follow up on all of the abnormal labs, imaging and findings that I have and have not addressed during your hospitalization. Discharging you from the hospital does not mean that your medical care ends here and now. You may still need additional work up, investigation, monitoring, and treatment to be handled from this point on by outside providers including your PCP, Giorgio Aguilar MD , Specialists and other healthcare providers. Please review your list of discharge medications prior to resuming medications you might still have at home, as the medications you need to be taking, dosages or how often you must take them may have changed. For medication questions, contact your retail pharmacy and your PCP, Giorgio Aguilar MD .     ** I STRONGLY RECOMMEND that you follow up with Giorgio Aguilar MD within 3 to 5 days for a post hospitalization evaluation. This specific office visit is covered by your insurance, and is not the same as your annual doctor visit/ check up. This office visit is important, as it may prevent need for repeat and/or future hospitalizations. **    Your medical team at [facility]) appreciates the opportunity to work with you to get well! Sincerely,  Nava Massey MD Follow up with Dr. Jenifer Mccormick MD in the next 7 days or sooner if needed. Please return to ER or call 911 if you develop any significant signs or symptoms. I may or may not have addressed all of your symptoms, medical issues,  Illnesses, or all of the abnormal blood work or imaging therefore please ask your PCP and other specialists to obtain [facility] record entirely to follow up on all of your symptoms, illnesses, abnormal labs, imaging and findings that I have and have not addressed during your hospitalization. Discharging you from the hospital does not mean that your medical care ends here and now. You may still need to have additional work up, investigation, monitoring, surveillance, and treatment to be handled from this point on by in the out patient setting by  out patient providers including your PCP, Specialists and other healthcare providers. For medication questions, contact your retail pharmacy and your PCP. Your medical team at [facility] appreciates the opportunity to work with you to get well!     Nava Massey MD

## 2021-09-26 NOTE — PROGRESS NOTES
Discharge instructions/medications reviewed with pt and pts spouse, both verbalized understanding. Pt transferred to car via w/c by staff with out incident.

## 2021-09-26 NOTE — PLAN OF CARE
Problem: Confusion - Acute:  Goal: Absence of continued neurological deterioration signs and symptoms  Description: Absence of continued neurological deterioration signs and symptoms  Outcome: Met This Shift  Goal: Mental status will be restored to baseline  Description: Mental status will be restored to baseline  Outcome: Met This Shift     Problem: Discharge Planning:  Goal: Ability to perform activities of daily living will improve  Description: Ability to perform activities of daily living will improve  Outcome: Ongoing  Goal: Participates in care planning  Description: Participates in care planning  Outcome: Ongoing     Problem: Injury - Risk of, Physical Injury:  Goal: Absence of physical injury  Description: Absence of physical injury  Outcome: Met This Shift  Goal: Will remain free from falls  Description: Will remain free from falls  Outcome: Met This Shift     Problem: Mood - Altered:  Goal: Mood stable  Description: Mood stable  Outcome: Met This Shift  Goal: Absence of abusive behavior  Description: Absence of abusive behavior  Outcome: Met This Shift  Goal: Verbalizations of feeling emotionally comfortable while being cared for will increase  Description: Verbalizations of feeling emotionally comfortable while being cared for will increase  Outcome: Met This Shift     Problem: Psychomotor Activity - Altered:  Goal: Absence of psychomotor disturbance signs and symptoms  Description: Absence of psychomotor disturbance signs and symptoms  Outcome: Met This Shift     Problem: Sensory Perception - Impaired:  Goal: Demonstrations of improved sensory functioning will increase  Description: Demonstrations of improved sensory functioning will increase  Outcome: Met This Shift  Goal: Decrease in sensory misperception frequency  Description: Decrease in sensory misperception frequency  Outcome: Met This Shift  Goal: Able to refrain from responding to false sensory perceptions  Description: Able to refrain from [facility]  INFECTIOUS DISEASES AND INTERNAL MEDICINE OF [place] [facility]  =======================================================  Elie Yoder MD [facility]   Kevin Veronica MD  Diplomates American Board of Internal Medicine and Infectious Diseases  =======================================================    NIKKI RODRIGUEZ [id number]  chief complaint: PEG site hematoma    patient seen and examined in follow up.  Chart and labs reviewed.   remains off antibiotics .   afebrile.   blood cultures in process.     =======================================================  Allergies:  No Known Allergies      =======================================================  Medications:  brimonidine 0.2% Ophthalmic Solution 1 Drop(s) Both EYES three times a day  dextrose 5%. 1000 milliLiter(s) IV Continuous <Continuous>  dextrose 50% Injectable 12.5 Gram(s) IV Push once  dextrose 50% Injectable 25 Gram(s) IV Push once  dextrose 50% Injectable 25 Gram(s) IV Push once  dextrose Gel 1 Dose(s) Oral once PRN  dorzolamide 2% Ophthalmic Solution 1 Drop(s) Both EYES three times a day  dorzolamide 2% Ophthalmic Solution      glucagon  Injectable 1 milliGRAM(s) IntraMuscular once PRN  lactobacillus acidophilus 1 Tablet(s) Oral daily  lactulose Syrup 15 Gram(s) Oral once PRN  metoprolol succinate ER 12.5 milliGRAM(s) Oral daily  pantoprazole  Injectable 40 milliGRAM(s) IV Push daily  polyethylene glycol 3350 17 Gram(s) Oral daily PRN  potassium chloride   Powder 40 milliEquivalent(s) Oral once  senna Syrup 10 milliLiter(s) Oral at bedtime PRN  timolol 0.5% Solution 1 Drop(s) Both EYES two times a day       =======================================================     REVIEW OF SYSTEMS:  limited due to medical illness.   =======================================================    Physical Exam:    Vital Signs Last 24 Hrs  T(C): 36.9 (2017 07:50), Max: 37 (2017 23:50)  T(F): 98.5 (2017 07:50), Max: 98.6 (2017 23:50)  HR: 113 (2017 07:50) (88 - 122)  BP: 125/77 (2017 07:50) (97/54 - 160/96)  RR: 18 (2017 07:50) (16 - 19)  SpO2: 99% (2017 07:50) (98% - 100%)  GEN: NAD, pleasant; thin  HEENT: normocephalic and atraumatic. EOMI. TACHO.    NECK: Contracted, and turned to right side. No carotid bruits.  No lymphadenopathy or thyromegaly.  LUNGS: Clear to auscultation.  HEART: Regular rate and rhythm, positive murmur.  ABDOMEN: Soft, nontender, and nondistended.  Positive bowel sounds.   PEG in left lower abd; hematoma at site; ecchymoses down to left inguinal fold.   : No CVA tenderness  EXTREMITIES: Without any cyanosis, clubbing, rash, lesions or edema.  MSK: no joint swelling  PSYCHIATRIC: Appropriate affect .  SKIN: No ulceration or induration present.    =======================================================    Labs:      140  |  103  |  9.0  ----------------------------<  105  3.3<L>   |  28.0  |  0.31<L>    Ca    9.2      2017 06:07  Mg     1.9         TPro  6.8  /  Alb  3.9  /  TBili  0.3<L>  /  DBili  x   /  AST  29  /  ALT  21  /  AlkPhos  98                            8.1    5.8   )-----------( 205      ( 2017 06:07 )             24.6       PT/INR - ( 2017 16:49 )   PT: 11.6 sec;   INR: 1.05 ratio         PTT - ( 2017 16:49 )  PTT:34.1 sec  Urinalysis Basic - ( 2017 19:35 )    Color: Yellow / Appearance: Clear / S.010 / pH: x  Gluc: x / Ketone: Negative  / Bili: Negative / Urobili: Negative mg/dL   Blood: x / Protein: Negative mg/dL / Nitrite: Negative   Leuk Esterase: Negative / RBC: 3-5 /HPF / WBC Negative   Sq Epi: x / Non Sq Epi: Occasional / Bacteria: x      LIVER FUNCTIONS - ( 2017 16:49 )  Alb: 3.9 g/dL / Pro: 6.8 g/dL / ALK PHOS: 98 U/L / ALT: 21 U/L / AST: 29 U/L / GGT: x              POCT Blood Glucose.: 141 mg/dL (2017 21:30)    ABG - ( 2017 17:08 )  pH: 7.42  /  pCO2: 46    /  pO2: 78    / HCO3: 29    / Base Excess: 4.8   /  SaO2: 97             RECENT CULTURES:   @ 19:34 .Urine Clean Catch (Midstream)     <10,000 CFU/ml Gram Negative Rods responding to false sensory perceptions  Outcome: Met This Shift  Goal: Demonstrates accurate environmental perceptions  Description: Demonstrates accurate environmental perceptions  Outcome: Met This Shift  Goal: Able to distinguish between reality-based and nonreality-based thinking  Description: Able to distinguish between reality-based and nonreality-based thinking  Outcome: Met This Shift  Goal: Able to interrupt nonreality-based thinking  Description: Able to interrupt nonreality-based thinking  Outcome: Met This Shift     Problem: Sleep Pattern Disturbance:  Goal: Appears well-rested  Description: Appears well-rested  Outcome: Met This Shift     Problem: Pain:  Description: Pain management should include both nonpharmacologic and pharmacologic interventions.   Goal: Pain level will decrease  Description: Pain level will decrease  Outcome: Met This Shift  Goal: Control of acute pain  Description: Control of acute pain  Outcome: Met This Shift  Goal: Control of chronic pain  Description: Control of chronic pain  Outcome: Met This Shift     Problem: Falls - Risk of:  Goal: Absence of physical injury  Description: Absence of physical injury  Outcome: Met This Shift  Goal: Will remain free from falls  Description: Will remain free from falls  Outcome: Met This Shift

## 2021-09-27 LAB
CSF CULTURE: NORMAL
GLUCOSE BLD-MCNC: 137 MG/DL (ref 60–115)
GLUCOSE BLD-MCNC: 170 MG/DL (ref 60–115)
GLUCOSE BLD-MCNC: 176 MG/DL (ref 60–115)
PERFORMED ON: ABNORMAL
RPR: NORMAL

## 2021-09-28 LAB — LYME, EIA: 0.43 LIV (ref 0–1.2)

## 2021-09-29 LAB
BLOOD CULTURE, ROUTINE: NORMAL
CULTURE, BLOOD 2: NORMAL
METHYLMALONIC ACID: 0.39 UMOL/L (ref 0–0.4)

## 2021-12-06 ENCOUNTER — HOSPITAL ENCOUNTER (OUTPATIENT)
Dept: LAB | Age: 71
Discharge: HOME | End: 2021-12-06
Payer: MEDICARE

## 2021-12-06 LAB
ALBUMIN SERPL-MCNC: 4 G/DL (ref 3.5–4.6)
ALP BLD-CCNC: 66 U/L (ref 35–104)
ALT SERPL-CCNC: 13 U/L (ref 0–41)
ANION GAP SERPL CALCULATED.3IONS-SCNC: 14 MEQ/L (ref 9–15)
AST SERPL-CCNC: 28 U/L (ref 0–40)
BILIRUB SERPL-MCNC: 0.6 MG/DL (ref 0.2–0.7)
BUN BLDV-MCNC: 16 MG/DL (ref 8–23)
CALCIUM SERPL-MCNC: 10 MG/DL (ref 8.5–9.9)
CHLORIDE BLD-SCNC: 103 MEQ/L (ref 95–107)
CHOLESTEROL, TOTAL: 137 MG/DL (ref 0–199)
CO2: 23 MEQ/L (ref 20–31)
CREAT SERPL-MCNC: 1.64 MG/DL (ref 0.7–1.2)
GFR AFRICAN AMERICAN: 50.5
GFR NON-AFRICAN AMERICAN: 41.7
GLOBULIN: 3.3 G/DL (ref 2.3–3.5)
GLUCOSE BLD-MCNC: 86 MG/DL (ref 70–99)
HCT VFR BLD CALC: 40.2 % (ref 42–52)
HDLC SERPL-MCNC: 50 MG/DL (ref 40–59)
HEMOGLOBIN: 13.2 G/DL (ref 14–18)
LDL CHOLESTEROL CALCULATED: 76 MG/DL (ref 0–129)
MCH RBC QN AUTO: 29.3 PG (ref 27–31.3)
MCHC RBC AUTO-ENTMCNC: 32.7 % (ref 33–37)
MCV RBC AUTO: 89.4 FL (ref 80–100)
PDW BLD-RTO: 16.1 % (ref 11.5–14.5)
PLATELET # BLD: 164 K/UL (ref 130–400)
POTASSIUM SERPL-SCNC: 4.4 MEQ/L (ref 3.4–4.9)
PROSTATE SPECIFIC ANTIGEN: 2.6 NG/ML (ref 0–4)
RBC # BLD: 4.49 M/UL (ref 4.7–6.1)
SODIUM BLD-SCNC: 140 MEQ/L (ref 135–144)
TOTAL PROTEIN: 7.3 G/DL (ref 6.3–8)
TRIGL SERPL-MCNC: 53 MG/DL (ref 0–150)
TSH SERPL DL<=0.05 MIU/L-ACNC: 0.38 UIU/ML (ref 0.44–3.86)
WBC # BLD: 4.5 K/UL (ref 4.8–10.8)

## 2021-12-06 PROCEDURE — 80061 LIPID PANEL: CPT

## 2021-12-06 PROCEDURE — 85027 COMPLETE CBC AUTOMATED: CPT

## 2021-12-06 PROCEDURE — 84443 ASSAY THYROID STIM HORMONE: CPT

## 2021-12-06 PROCEDURE — 80053 COMPREHEN METABOLIC PANEL: CPT

## 2021-12-06 PROCEDURE — 84153 ASSAY OF PSA TOTAL: CPT

## 2021-12-06 PROCEDURE — 36415 COLL VENOUS BLD VENIPUNCTURE: CPT

## 2021-12-10 PROBLEM — H53.9 VISION DISORDER: Status: ACTIVE | Noted: 2021-12-10

## 2021-12-10 PROBLEM — M25.569 KNEE PAIN: Status: ACTIVE | Noted: 2021-12-10

## 2021-12-10 PROBLEM — E11.9 TYPE 2 DIABETES MELLITUS WITHOUT RETINOPATHY (HCC): Status: ACTIVE | Noted: 2021-09-16

## 2021-12-10 PROBLEM — M17.12 OSTEOARTHRITIS OF LEFT KNEE: Status: ACTIVE | Noted: 2021-12-10

## 2021-12-13 ENCOUNTER — OFFICE VISIT (OUTPATIENT)
Dept: NEUROLOGY | Age: 71
End: 2021-12-13
Payer: MEDICARE

## 2021-12-13 VITALS
WEIGHT: 228 LBS | SYSTOLIC BLOOD PRESSURE: 138 MMHG | HEART RATE: 92 BPM | DIASTOLIC BLOOD PRESSURE: 84 MMHG | BODY MASS INDEX: 26.35 KG/M2

## 2021-12-13 DIAGNOSIS — A86 VIRAL ENCEPHALITIS: Primary | ICD-10-CM

## 2021-12-13 DIAGNOSIS — G93.40 ENCEPHALOPATHY: ICD-10-CM

## 2021-12-13 DIAGNOSIS — F09 MILD COGNITIVE DISORDER: ICD-10-CM

## 2021-12-13 DIAGNOSIS — G47.52 REM BEHAVIORAL DISORDER: ICD-10-CM

## 2021-12-13 DIAGNOSIS — G47.33 OSA (OBSTRUCTIVE SLEEP APNEA): ICD-10-CM

## 2021-12-13 PROCEDURE — 99214 OFFICE O/P EST MOD 30 MIN: CPT | Performed by: PSYCHIATRY & NEUROLOGY

## 2021-12-13 RX ORDER — MELOXICAM 15 MG/1
TABLET ORAL
Status: ON HOLD | COMMUNITY
Start: 2021-07-30 | End: 2022-03-16 | Stop reason: HOSPADM

## 2021-12-13 RX ORDER — DONEPEZIL HYDROCHLORIDE 10 MG/1
TABLET, FILM COATED ORAL
COMMUNITY
Start: 2021-11-10

## 2021-12-13 RX ORDER — METFORMIN HYDROCHLORIDE 500 MG/1
TABLET, EXTENDED RELEASE ORAL
COMMUNITY
Start: 2021-10-01

## 2021-12-13 RX ORDER — METOPROLOL SUCCINATE 25 MG/1
TABLET, EXTENDED RELEASE ORAL
COMMUNITY
Start: 2021-10-01

## 2021-12-13 ASSESSMENT — ENCOUNTER SYMPTOMS
PHOTOPHOBIA: 0
VOMITING: 0
CHOKING: 0
TROUBLE SWALLOWING: 0
SHORTNESS OF BREATH: 0
NAUSEA: 0
BACK PAIN: 0
COLOR CHANGE: 0

## 2021-12-13 NOTE — PROGRESS NOTES
Subjective:      Patient ID: Karis Foy is a 80 y.o. male who presents today for:  Chief Complaint   Patient presents with    Follow-up     Pt says that things have been alright. He says every now and then he gets a little headache, he wants to know if he can take something to try to help them. HPI-10year-old right-handed gentleman with a history of with a previous history of viral meningitis  With encephalitis. Patient had 23 white cell count in the lumbar puncture and was treated with acyclovir. Patient has developed REM sleep disorder and was tried on Klonopin. Is on Aricept as well. Repeat MRI did not have any sequelae. He continues to have issues with REM sleep. He otherwise is doing well. We have continued him on Klonopin 2 mg and he was on gabapentin which we discontinued to memory issues. He is doing well except every now and then he may have a mild headache without any nausea vomiting or visual dysfunction. Does have some memory issues is back on Aricept 10 mg. Still has memory issues and just short-term. He may forget to close the rest of the lites and requires some supervision he drives but does not get lost but his wife is always with him.   He still able to function very well is due for a knee surgery  Past Medical History:   Diagnosis Date    [facility]     Diabetes mellitus [facility]     Hyperlipidemia     Hypertension     Lung disease      Past Surgical History:   Procedure Laterality Date    COLONOSCOPY      HAND SURGERY Right     CA COLON CA SCRN NOT HI RSK IND N/A 8/15/2018    COLONOSCOPY performed by Zoraida Johnson MD at [id number] [facility] N/A 5/21/2019    SIGMOIDOSCOPY W/ DILATION performed by Zoraida Johnson MD at [address] History     Socioeconomic History    Marital status:      Spouse name: Not on file    Number of children: Not on file    Years of education: Not on file    Highest education level: Not on file   Occupational History    Not on file   Tobacco Use    Smoking status: Former Smoker     Years: 40.00     Types: Cigarettes     Start date: 1968     Quit date: 2000     Years since quittin.5    Smokeless tobacco: Never Used   Vaping Use    Vaping Use: Never used   Substance and Sexual Activity    Alcohol use: No     Alcohol/week: 0.0 standard drinks    Drug use: No    Sexual activity: Not on file   Other Topics Concern    Not on file   Social History Narrative    Not on file     Social Determinants of Health     Financial Resource Strain:     Difficulty of Paying Living Expenses: Not on file   Food Insecurity:     Worried About Running Out of Food in the Last Year: Not on file    Fercho of Food in the Last Year: Not on file   Transportation Needs:     Lack of Transportation (Medical): Not on file    Lack of Transportation (Non-Medical):  Not on file   Physical Activity:     Days of Exercise per Week: Not on file    Minutes of Exercise per Session: Not on file   Stress:     Feeling of Stress : Not on file   Social Connections:     Frequency of Communication with Friends and Family: Not on file    Frequency of Social Gatherings with Friends and Family: Not on file    Attends Christianity Services: Not on file    Active Member of [employer] or Organizations: Not on file    Attends Club or Organization Meetings: Not on file    Marital Status: Not on file   Intimate Partner Violence:     Fear of Current or Ex-Partner: Not on file    Emotionally Abused: Not on file    Physically Abused: Not on file    Sexually Abused: Not on file   Housing Stability:     Unable to Pay for Housing in the Last Year: Not on file    Number of [place] in the Last Year: Not on file    Unstable Housing in the Last Year: Not on file     Family History   Problem Relation Age of Onset    Coronary Art Dis Mother     Coronary Art Dis Sister     Colon Cancer Brother     Coronary Art Dis Brother      No Known Allergies    Current Outpatient Medications   Medication Sig Dispense Refill    meloxicam (MOBIC) 15 MG tablet Take by mouth      metFORMIN (GLUCOPHAGE-XR) 500 MG extended release tablet       metoprolol succinate (TOPROL XL) 25 MG extended release tablet       donepezil (ARICEPT) 10 MG tablet       aspirin 325 MG EC tablet Take 1 tablet by mouth daily 30 tablet 3    glimepiride (AMARYL) 2 MG tablet Take 1 tablet by mouth every morning Do not take if your blood sugar drops below 125 30 tablet 1    budesonide-formoterol (SYMBICORT) 160-4.5 MCG/ACT AERO Inhale 2 puffs into the lungs 2 times daily      metoprolol tartrate (LOPRESSOR) 25 MG tablet Take 1 tablet by mouth 2 times daily 60 tablet 3    Respiratory Therapy Supplies SOURAV Change CPAP pressure to 6 cm   New CPAP mask and supplies 1 Device 0    levalbuterol (XOPENEX HFA) 45 MCG/ACT inhaler Inhale 2 puffs into the lungs every 4 hours as needed for Wheezing      brimonidine (ALPHAGAN) 0.2 % ophthalmic solution Place 1 drop into both eyes 2 times daily       lovastatin (MEVACOR) 40 MG tablet Take 40 mg by mouth daily       levothyroxine (SYNTHROID) 100 MCG tablet Take 100 mcg by mouth Daily       clonazePAM (KLONOPIN) 2 MG tablet Take 2 mg by mouth nightly.  allopurinol (ZYLOPRIM) 300 MG tablet       ammonium lactate (AMLACTIN) 12 % cream Apply topically as needed for Dry Skin Apply topically as needed. (Patient not taking: Reported on 12/13/2021)       No current facility-administered medications for this visit. Review of Systems   Constitutional: Negative for fever. HENT: Negative for ear pain, tinnitus and trouble swallowing. Eyes: Negative for photophobia and visual disturbance. Respiratory: Negative for choking and shortness of breath. Cardiovascular: Negative for chest pain and palpitations. Gastrointestinal: Negative for nausea and vomiting.    Musculoskeletal: Negative for back pain, gait problem, joint swelling, myalgias, neck pain and neck stiffness. Skin: Negative for color change. Allergic/Immunologic: Negative for food allergies. Neurological: Negative for dizziness, tremors, seizures, syncope, facial asymmetry, speech difficulty, weakness, light-headedness, numbness and headaches. Psychiatric/Behavioral: Negative for behavioral problems, confusion, hallucinations and sleep disturbance. Objective:   /84 (Site: Left Upper Arm, Position: Sitting, Cuff Size: Medium Adult)   Pulse 92   Wt 228 lb (103.4 kg)   BMI 26.35 kg/m²     Physical Exam  Vitals reviewed. Eyes:      Pupils: Pupils are equal, round, and reactive to light. Cardiovascular:      Rate and Rhythm: Normal rate and regular rhythm. Heart sounds: No murmur heard. Pulmonary:      Effort: Pulmonary effort is normal.      Breath sounds: Normal breath sounds. Abdominal:      General: Bowel sounds are normal.   Musculoskeletal:         General: Normal range of motion. Cervical back: Normal range of motion. Skin:     General: Skin is warm. Neurological:      Mental Status: He is alert and oriented to person, place, and time. Cranial Nerves: No cranial nerve deficit. Sensory: No sensory deficit. Motor: No abnormal muscle tone. Coordination: Coordination normal.      Deep Tendon Reflexes: Reflexes are normal and symmetric. Babinski sign absent on the right side. Babinski sign absent on the left side. Psychiatric:         Mood and Affect: Mood normal.         No results found.     Lab Results   Component Value Date    WBC 4.5 12/06/2021    RBC 4.49 12/06/2021    HGB 13.2 12/06/2021    HCT 40.2 12/06/2021    MCV 89.4 12/06/2021    MCH 29.3 12/06/2021    MCHC 32.7 12/06/2021    RDW 16.1 12/06/2021     12/06/2021     Lab Results   Component Value Date     12/06/2021    K 4.4 12/06/2021    K 4.5 09/25/2021     12/06/2021    CO2 23 12/06/2021    BUN 16 12/06/2021    CREATININE 1.64 12/06/2021    GFRAA 50.5 12/06/2021    LABGLOM 41.7 12/06/2021    GLUCOSE 86 12/06/2021    GLUCOSE 79 05/09/2012    PROT 7.3 12/06/2021    LABALBU 4.0 12/06/2021    CALCIUM 10.0 12/06/2021    BILITOT 0.6 12/06/2021    ALKPHOS 66 12/06/2021    AST 28 12/06/2021    ALT 13 12/06/2021     Lab Results   Component Value Date    PROTIME 13.0 03/04/2021    INR 1.0 03/04/2021     Lab Results   Component Value Date    TSH 0.383 12/06/2021    DSMLCXLH67 454 09/25/2021    FOLATE 18.2 09/25/2021     Lab Results   Component Value Date    TRIG 53 12/06/2021    HDL 50 12/06/2021    LDLCALC 76 12/06/2021     Lab Results   Component Value Date    LABAMPH Neg 03/04/2021    BARBSCNU Neg 03/04/2021    LABBENZ Neg 03/04/2021    OPIATESCREENURINE POSITIVE 03/04/2021    PHENCYCLIDINESCREENURINE Neg 03/04/2021     No results found for: LITHIUM, DILFRTOT, VALPROATE    Assessment:       Diagnosis Orders   1. Viral encephalitis     2. Mild cognitive disorder     3. Encephalopathy     4. REM behavioral disorder     5. YANG (obstructive sleep apnea)     Viral encephalitis with an abnormal CSF findings. Patient was treated with acyclovir. Herpes titers are negative. Patient has done well except he does have some short-term memory issues with not quite sure if this is the beginning of initial cognitive impairment or this is an aftermath of the disease process. Patient is on Aricept. He still requires some supervision. Next our main issues appears to be REM sleep disorder is on 2 mg of Klonopin which is helping his been on this medication for quite some time. He may have underlying other sleep disorders. Further patient does have knee issues and is due to have operative procedures he had some questions regarding what would happen to his memory well that is not quite an issue unless very high doses of pain medications I used postoperative complications can occur.     Mild cognitive impairment which could be age-related or an aftermath of encephalitis patient is on Aricept which may or may not help. We will continue this though for now. Patient is does not have seizure disorder is not on anticonvulsants. Plan:      No orders of the defined types were placed in this encounter. No orders of the defined types were placed in this encounter. No follow-ups on file.       Eduardo Cross MD

## 2022-02-02 ENCOUNTER — HOSPITAL ENCOUNTER (OUTPATIENT)
Dept: MRI IMAGING | Age: 72
Discharge: HOME | End: 2022-02-04
Payer: MEDICARE

## 2022-02-02 DIAGNOSIS — M17.12 OSTEOARTHRITIS OF LEFT KNEE, UNSPECIFIED OSTEOARTHRITIS TYPE: ICD-10-CM

## 2022-02-02 PROCEDURE — 73721 MRI JNT OF LWR EXTRE W/O DYE: CPT

## 2022-02-22 ENCOUNTER — HOSPITAL ENCOUNTER (OUTPATIENT)
Dept: LAB | Age: 72
Discharge: HOME | End: 2022-02-22
Payer: MEDICARE

## 2022-02-22 LAB — TSH SERPL DL<=0.05 MIU/L-ACNC: 3.04 UIU/ML (ref 0.44–3.86)

## 2022-02-22 PROCEDURE — 36415 COLL VENOUS BLD VENIPUNCTURE: CPT

## 2022-02-22 PROCEDURE — 84443 ASSAY THYROID STIM HORMONE: CPT

## 2022-03-08 ENCOUNTER — HOSPITAL ENCOUNTER (OUTPATIENT)
Dept: PREADMISSION TESTING | Age: 72
Discharge: HOME | End: 2022-03-12
Payer: MEDICARE

## 2022-03-08 VITALS
RESPIRATION RATE: 18 BRPM | BODY MASS INDEX: 29.57 KG/M2 | WEIGHT: 230.38 LBS | HEIGHT: 74 IN | HEART RATE: 80 BPM | DIASTOLIC BLOOD PRESSURE: 73 MMHG | TEMPERATURE: 97.5 F | SYSTOLIC BLOOD PRESSURE: 145 MMHG | OXYGEN SATURATION: 98 %

## 2022-03-08 LAB
ABO/RH: NORMAL
ALBUMIN SERPL-MCNC: 4.2 G/DL (ref 3.5–4.6)
ALP BLD-CCNC: 76 U/L (ref 35–104)
ALT SERPL-CCNC: 14 U/L (ref 0–41)
ANION GAP SERPL CALCULATED.3IONS-SCNC: 14 MEQ/L (ref 9–15)
ANTIBODY SCREEN: NORMAL
APTT: 25.4 SEC (ref 24.4–36.8)
AST SERPL-CCNC: 20 U/L (ref 0–40)
BASOPHILS ABSOLUTE: 0 K/UL (ref 0–0.2)
BASOPHILS RELATIVE PERCENT: 0.2 %
BILIRUB SERPL-MCNC: 0.3 MG/DL (ref 0.2–0.7)
BILIRUBIN URINE: NEGATIVE
BLOOD, URINE: NEGATIVE
BUN BLDV-MCNC: 22 MG/DL (ref 8–23)
CALCIUM SERPL-MCNC: 10 MG/DL (ref 8.5–9.9)
CHLORIDE BLD-SCNC: 101 MEQ/L (ref 95–107)
CLARITY: CLEAR
CO2: 24 MEQ/L (ref 20–31)
COLOR: YELLOW
CREAT SERPL-MCNC: 1.65 MG/DL (ref 0.7–1.2)
EOSINOPHILS ABSOLUTE: 0 K/UL (ref 0–0.7)
EOSINOPHILS RELATIVE PERCENT: 0 %
GFR AFRICAN AMERICAN: 50.1
GFR NON-AFRICAN AMERICAN: 41.4
GLOBULIN: 3.6 G/DL (ref 2.3–3.5)
GLUCOSE BLD-MCNC: 143 MG/DL (ref 70–99)
GLUCOSE URINE: NEGATIVE MG/DL
HBA1C MFR BLD: 7.5 % (ref 4.8–5.9)
HCT VFR BLD CALC: 46.2 % (ref 42–52)
HEMOGLOBIN: 15.1 G/DL (ref 14–18)
INR BLD: 1.1
KETONES, URINE: NEGATIVE MG/DL
LEUKOCYTE ESTERASE, URINE: NEGATIVE
LYMPHOCYTES ABSOLUTE: 0.9 K/UL (ref 1–4.8)
LYMPHOCYTES RELATIVE PERCENT: 7.3 %
MCH RBC QN AUTO: 28 PG (ref 27–31.3)
MCHC RBC AUTO-ENTMCNC: 32.6 % (ref 33–37)
MCV RBC AUTO: 85.9 FL (ref 80–100)
MONOCYTES ABSOLUTE: 0.4 K/UL (ref 0.2–0.8)
MONOCYTES RELATIVE PERCENT: 3.4 %
NEUTROPHILS ABSOLUTE: 11.1 K/UL (ref 1.4–6.5)
NEUTROPHILS RELATIVE PERCENT: 89.1 %
NITRITE, URINE: NEGATIVE
PDW BLD-RTO: 16.5 % (ref 11.5–14.5)
PH UA: 5 (ref 5–9)
PLATELET # BLD: 185 K/UL (ref 130–400)
POTASSIUM SERPL-SCNC: 4.5 MEQ/L (ref 3.4–4.9)
PROTEIN UA: NEGATIVE MG/DL
PROTHROMBIN TIME: 13.7 SEC (ref 12.3–14.9)
RBC # BLD: 5.38 M/UL (ref 4.7–6.1)
SODIUM BLD-SCNC: 139 MEQ/L (ref 135–144)
SPECIFIC GRAVITY UA: 1.01 (ref 1–1.03)
TOTAL PROTEIN: 7.8 G/DL (ref 6.3–8)
URINE REFLEX TO CULTURE: NORMAL
UROBILINOGEN, URINE: 0.2 E.U./DL
WBC # BLD: 12.4 K/UL (ref 4.8–10.8)

## 2022-03-08 PROCEDURE — 86900 BLOOD TYPING SEROLOGIC ABO: CPT

## 2022-03-08 PROCEDURE — 85025 COMPLETE CBC W/AUTO DIFF WBC: CPT

## 2022-03-08 PROCEDURE — 85610 PROTHROMBIN TIME: CPT

## 2022-03-08 PROCEDURE — 85730 THROMBOPLASTIN TIME PARTIAL: CPT

## 2022-03-08 PROCEDURE — 86850 RBC ANTIBODY SCREEN: CPT

## 2022-03-08 PROCEDURE — 86901 BLOOD TYPING SEROLOGIC RH(D): CPT

## 2022-03-08 PROCEDURE — 93005 ELECTROCARDIOGRAM TRACING: CPT | Performed by: ORTHOPAEDIC SURGERY

## 2022-03-08 PROCEDURE — 80053 COMPREHEN METABOLIC PANEL: CPT

## 2022-03-08 PROCEDURE — 83036 HEMOGLOBIN GLYCOSYLATED A1C: CPT

## 2022-03-08 PROCEDURE — 87081 CULTURE SCREEN ONLY: CPT

## 2022-03-08 PROCEDURE — 81003 URINALYSIS AUTO W/O SCOPE: CPT

## 2022-03-09 LAB
EKG ATRIAL RATE: 77 BPM
EKG P AXIS: 60 DEGREES
EKG P-R INTERVAL: 194 MS
EKG Q-T INTERVAL: 352 MS
EKG QRS DURATION: 110 MS
EKG QTC CALCULATION (BAZETT): 398 MS
EKG R AXIS: -52 DEGREES
EKG T AXIS: 74 DEGREES
EKG VENTRICULAR RATE: 77 BPM

## 2022-03-09 RX ORDER — TRANEXAMIC ACID 650 1/1
1950 TABLET ORAL ONCE
Status: CANCELLED | OUTPATIENT
Start: 2022-03-09 | End: 2022-03-09

## 2022-03-10 LAB — MRSA CULTURE ONLY: NORMAL

## 2022-03-10 RX ORDER — SODIUM CHLORIDE 0.9 % (FLUSH) 0.9 %
10 SYRINGE (ML) INJECTION EVERY 12 HOURS SCHEDULED
Status: CANCELLED | OUTPATIENT
Start: 2022-03-15

## 2022-03-10 RX ORDER — SODIUM CHLORIDE, SODIUM LACTATE, POTASSIUM CHLORIDE, CALCIUM CHLORIDE 600; 310; 30; 20 MG/100ML; MG/100ML; MG/100ML; MG/100ML
INJECTION, SOLUTION INTRAVENOUS CONTINUOUS
Status: CANCELLED | OUTPATIENT
Start: 2022-03-15

## 2022-03-10 RX ORDER — SODIUM CHLORIDE 9 MG/ML
25 INJECTION, SOLUTION INTRAVENOUS PRN
Status: CANCELLED | OUTPATIENT
Start: 2022-03-15

## 2022-03-10 RX ORDER — LIDOCAINE HYDROCHLORIDE 10 MG/ML
1 INJECTION, SOLUTION EPIDURAL; INFILTRATION; INTRACAUDAL; PERINEURAL
Status: CANCELLED | OUTPATIENT
Start: 2022-03-15 | End: 2022-03-15

## 2022-03-10 RX ORDER — SODIUM CHLORIDE 0.9 % (FLUSH) 0.9 %
10 SYRINGE (ML) INJECTION PRN
Status: CANCELLED | OUTPATIENT
Start: 2022-03-15

## 2022-03-14 ENCOUNTER — ANESTHESIA EVENT (OUTPATIENT)
Dept: OPERATING ROOM | Age: 72
End: 2022-03-14
Payer: MEDICARE

## 2022-03-14 NOTE — ANESTHESIA PRE PROCEDURE
Department of Anesthesiology  Preprocedure Note       Name:  Kory Martinez   Age:  80 y.o.  :  1950                                          MRN:  [id number]         Date:  3/14/2022      Surgeon: Sunil Mullins):  Tho Mendoza MD    Procedure: LEFT TOTAL KNEE ARTHROPLASTY. SUPINE, MU PSI (Left Knee)    Medications prior to admission:   Prior to Admission medications    Medication Sig Start Date End Date Taking? Authorizing Provider   Aspirin 81 MG CAPS 1 tablet    Historical Provider, MD   meloxicam (MOBIC) 15 MG tablet Take by mouth 21   Historical Provider, MD   metFORMIN (GLUCOPHAGE-XR) 500 MG extended release tablet  10/1/21   Historical Provider, MD   metoprolol succinate (TOPROL XL) 25 MG extended release tablet  10/1/21   Historical Provider, MD   donepezil (ARICEPT) 10 MG tablet  11/10/21   Historical Provider, MD   ammonium lactate (AMLACTIN) 12 % cream Apply topically as needed for Dry Skin Apply topically as needed. Patient not taking: Reported on 2021    Historical Provider, MD   budesonide-formoterol (SYMBICORT) 160-4.5 MCG/ACT AERO Inhale 2 puffs into the lungs 2 times daily    Historical Provider, MD   Respiratory Therapy Supplies SOURAV Change CPAP pressure to 6 cm   New CPAP mask and supplies 10/25/18   Liudmila Mendoza MD   levalbuterol Alondra Aschoff HFA) 45 MCG/ACT inhaler Inhale 2 puffs into the lungs every 4 hours as needed for Wheezing    Historical Provider, MD   brimonidine (ALPHAGAN) 0.2 % ophthalmic solution Place 1 drop into both eyes 2 times daily  16   Historical Provider, MD   lovastatin (MEVACOR) 40 MG tablet Take 40 mg by mouth daily  16   Historical Provider, MD   levothyroxine (SYNTHROID) 100 MCG tablet Take 100 mcg by mouth Daily  16   Historical Provider, MD   clonazePAM (KLONOPIN) 2 MG tablet Take 2 mg by mouth nightly.   12/26/15   Historical Provider, MD   allopurinol (ZYLOPRIM) 300 MG tablet  16   Historical Provider, MD       Current medications:    Current Outpatient Medications   Medication Sig Dispense Refill    Aspirin 81 MG CAPS 1 tablet      meloxicam (MOBIC) 15 MG tablet Take by mouth      metFORMIN (GLUCOPHAGE-XR) 500 MG extended release tablet       metoprolol succinate (TOPROL XL) 25 MG extended release tablet       donepezil (ARICEPT) 10 MG tablet       ammonium lactate (AMLACTIN) 12 % cream Apply topically as needed for Dry Skin Apply topically as needed. (Patient not taking: Reported on 12/13/2021)      budesonide-formoterol (SYMBICORT) 160-4.5 MCG/ACT AERO Inhale 2 puffs into the lungs 2 times daily      Respiratory Therapy Supplies SOURAV Change CPAP pressure to 6 cm   New CPAP mask and supplies 1 Device 0    levalbuterol (XOPENEX HFA) 45 MCG/ACT inhaler Inhale 2 puffs into the lungs every 4 hours as needed for Wheezing      brimonidine (ALPHAGAN) 0.2 % ophthalmic solution Place 1 drop into both eyes 2 times daily       lovastatin (MEVACOR) 40 MG tablet Take 40 mg by mouth daily       levothyroxine (SYNTHROID) 100 MCG tablet Take 100 mcg by mouth Daily       clonazePAM (KLONOPIN) 2 MG tablet Take 2 mg by mouth nightly.  allopurinol (ZYLOPRIM) 300 MG tablet        No current facility-administered medications for this visit. Allergies:     Allergies   Allergen Reactions    Pravastatin Other (See Comments)    Simvastatin Other (See Comments)       Problem List:    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    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    Type 2 diabetes mellitus (HCC) E11.9    Wheezing R06.2    Proctitis K62.89    AMS (altered mental status) R41.82    Hyperlipidemia E78.5    Hypothyroid E03.9    Hypertension I10    CKD (chronic kidney disease) N18.9    Viral encephalitis A86    Chest pain R07.9    Mild cognitive disorder F09    REM behavioral disorder G47.52    Altered mental status R41.82    Encephalopathy G93.40    Knee pain M25.569    Osteoarthritis of left knee M17.12    Type 2 diabetes mellitus without retinopathy (HCC) E11.9    Vision disorder H53.9       Past Medical History:        Diagnosis Date    COPD (chronic obstructive pulmonary disease) (HCC)     Diabetes mellitus (HCC)     Hyperlipidemia     Hypertension     Lung disease     Meningitis spinal        Past Surgical History:        Procedure Laterality Date    COLONOSCOPY      HAND SURGERY Right     [place] COLON CA SCRN NOT HI RSK IND N/A 8/15/2018    COLONOSCOPY performed by Jose Martell MD at [address] [place] [address] N/A 2019    SIGMOIDOSCOPY W/ DILATION performed by Jose Martell MD at [address] [place] [address]         Social History:    Social History     Tobacco Use    Smoking status: Former Smoker     Years: 40.00     Types: Cigarettes     Start date: 1968     Quit date: 2000     Years since quittin.7    Smokeless tobacco: Never Used   Substance Use Topics    Alcohol use: No     Alcohol/week: 0.0 standard drinks                                Counseling given: Not Answered      Vital Signs (Current): There were no vitals filed for this visit.                                            BP Readings from Last 3 Encounters:   22 (!) 145/73   21 138/84   21 119/71       NPO Status:                                                                                 BMI:   Wt Readings from Last 3 Encounters:   22 230 lb 6 oz (104.5 kg) 12/13/21 228 lb (103.4 kg)   09/23/21 220 lb (99.8 kg)     There is no height or weight on file to calculate BMI.    CBC:   Lab Results   Component Value Date    WBC 12.4 03/08/2022    RBC 5.38 03/08/2022    HGB 15.1 03/08/2022    HCT 46.2 03/08/2022    MCV 85.9 03/08/2022    RDW 16.5 03/08/2022     03/08/2022       CMP:   Lab Results   Component Value Date     03/08/2022    K 4.5 03/08/2022    K 4.5 09/25/2021     03/08/2022    CO2 24 03/08/2022    BUN 22 03/08/2022    CREATININE 1.65 03/08/2022    GFRAA 50.1 03/08/2022    LABGLOM 41.4 03/08/2022    GLUCOSE 143 03/08/2022    GLUCOSE 79 05/09/2012    PROT 7.8 03/08/2022    CALCIUM 10.0 03/08/2022    BILITOT 0.3 03/08/2022    ALKPHOS 76 03/08/2022    AST 20 03/08/2022    ALT 14 03/08/2022       POC Tests: No results for input(s): POCGLU, POCNA, POCK, POCCL, POCBUN, POCHEMO, POCHCT in the last 72 hours. Coags:   Lab Results   Component Value Date    PROTIME 13.7 03/08/2022    INR 1.1 03/08/2022    APTT 25.4 03/08/2022       HCG (If Applicable): No results found for: PREGTESTUR, PREGSERUM, HCG, HCGQUANT     ABGs: No results found for: PHART, PO2ART, VFM7ECM, UPQ1YUV, BEART, Z1XJOJSO     Type & Screen (If Applicable):  No results found for: [facility]    Anesthesia Evaluation  Patient summary reviewed and Nursing notes reviewed  Airway: Mallampati: II  TM distance: >3 FB   Neck ROM: full  Mouth opening: > = 3 FB Dental: normal exam         Pulmonary:normal exam    (+) COPD:  sleep apnea:  asthma:           Patient did not smoke on day of surgery.                 ROS comment: Former smoker   Cardiovascular:  Exercise tolerance: no interval change,   (+) hypertension:, hyperlipidemia      ECG reviewed               Beta Blocker:  Not on Beta Blocker         Neuro/Psych:   Negative Neuro/Psych ROS              GI/Hepatic/Renal:   (+) bowel prep,           Endo/Other:    (+) DiabetesType II DM, , hypothyroidism::., .                 Abdominal: Vascular: negative vascular ROS. Other Findings:               Anesthesia Plan      regional and spinal     ASA 3     (Bupivicaine 0.5% 10 mg   Saphenous nerve block with US  Discussed risk / benefit of spinal anesthesia versus general anesthesia. Discussed risk of bleeding, bruising, infection and nerve injury.)  Induction: intravenous. MIPS: Prophylactic antiemetics administered. Anesthetic plan and risks discussed with patient.       Plan discussed with surgical team.    Attending anesthesiologist reviewed and agrees with Pre Eval content              Orquidea Koch MD   3/14/2022

## 2022-03-15 ENCOUNTER — APPOINTMENT (OUTPATIENT)
Dept: GENERAL RADIOLOGY | Age: 72
End: 2022-03-15
Attending: ORTHOPAEDIC SURGERY
Payer: MEDICARE

## 2022-03-15 ENCOUNTER — HOSPITAL ENCOUNTER (OUTPATIENT)
Age: 72
Setting detail: OBSERVATION
Discharge: HOME HEALTH | End: 2022-03-16
Attending: ORTHOPAEDIC SURGERY | Admitting: ORTHOPAEDIC SURGERY
Payer: MEDICARE

## 2022-03-15 ENCOUNTER — ANESTHESIA (OUTPATIENT)
Dept: OPERATING ROOM | Age: 72
End: 2022-03-15
Payer: MEDICARE

## 2022-03-15 VITALS
DIASTOLIC BLOOD PRESSURE: 61 MMHG | OXYGEN SATURATION: 97 % | SYSTOLIC BLOOD PRESSURE: 105 MMHG | RESPIRATION RATE: 12 BRPM

## 2022-03-15 PROBLEM — Z96.652 H/O TOTAL KNEE REPLACEMENT, LEFT: Status: ACTIVE | Noted: 2022-03-15

## 2022-03-15 LAB
GLUCOSE BLD-MCNC: 120 MG/DL (ref 70–99)
GLUCOSE BLD-MCNC: 122 MG/DL (ref 70–99)
GLUCOSE BLD-MCNC: 131 MG/DL (ref 70–99)
GLUCOSE BLD-MCNC: 133 MG/DL (ref 70–99)
GLUCOSE BLD-MCNC: 169 MG/DL (ref 70–99)
PERFORMED ON: ABNORMAL

## 2022-03-15 PROCEDURE — 6360000002 HC RX W HCPCS: Performed by: ORTHOPAEDIC SURGERY

## 2022-03-15 PROCEDURE — 2580000003 HC RX 258: Performed by: ORTHOPAEDIC SURGERY

## 2022-03-15 PROCEDURE — 2720000010 HC SURG SUPPLY STERILE: Performed by: ORTHOPAEDIC SURGERY

## 2022-03-15 PROCEDURE — 3600000014 HC SURGERY LEVEL 4 ADDTL 15MIN: Performed by: ORTHOPAEDIC SURGERY

## 2022-03-15 PROCEDURE — C1776 JOINT DEVICE (IMPLANTABLE): HCPCS | Performed by: ORTHOPAEDIC SURGERY

## 2022-03-15 PROCEDURE — 3700000000 HC ANESTHESIA ATTENDED CARE: Performed by: ORTHOPAEDIC SURGERY

## 2022-03-15 PROCEDURE — 64447 NJX AA&/STRD FEMORAL NRV IMG: CPT | Performed by: ANESTHESIOLOGY

## 2022-03-15 PROCEDURE — 73560 X-RAY EXAM OF KNEE 1 OR 2: CPT

## 2022-03-15 PROCEDURE — G0378 HOSPITAL OBSERVATION PER HR: HCPCS

## 2022-03-15 PROCEDURE — 6370000000 HC RX 637 (ALT 250 FOR IP): Performed by: ORTHOPAEDIC SURGERY

## 2022-03-15 PROCEDURE — C1713 ANCHOR/SCREW BN/BN,TIS/BN: HCPCS | Performed by: ORTHOPAEDIC SURGERY

## 2022-03-15 PROCEDURE — 7100000001 HC PACU RECOVERY - ADDTL 15 MIN: Performed by: ORTHOPAEDIC SURGERY

## 2022-03-15 PROCEDURE — 6370000000 HC RX 637 (ALT 250 FOR IP): Performed by: INTERNAL MEDICINE

## 2022-03-15 PROCEDURE — 97530 THERAPEUTIC ACTIVITIES: CPT

## 2022-03-15 PROCEDURE — 2580000003 HC RX 258: Performed by: NURSE PRACTITIONER

## 2022-03-15 PROCEDURE — 3600000004 HC SURGERY LEVEL 4 BASE: Performed by: ORTHOPAEDIC SURGERY

## 2022-03-15 PROCEDURE — 3700000001 HC ADD 15 MINUTES (ANESTHESIA): Performed by: ORTHOPAEDIC SURGERY

## 2022-03-15 PROCEDURE — 2500000003 HC RX 250 WO HCPCS: Performed by: ANESTHESIOLOGY

## 2022-03-15 PROCEDURE — 6360000002 HC RX W HCPCS: Performed by: ANESTHESIOLOGY

## 2022-03-15 PROCEDURE — 2709999900 HC NON-CHARGEABLE SUPPLY: Performed by: ORTHOPAEDIC SURGERY

## 2022-03-15 PROCEDURE — 2580000003 HC RX 258: Performed by: ANESTHESIOLOGY

## 2022-03-15 PROCEDURE — 7100000000 HC PACU RECOVERY - FIRST 15 MIN: Performed by: ORTHOPAEDIC SURGERY

## 2022-03-15 PROCEDURE — A4217 STERILE WATER/SALINE, 500 ML: HCPCS | Performed by: ORTHOPAEDIC SURGERY

## 2022-03-15 PROCEDURE — 97162 PT EVAL MOD COMPLEX 30 MIN: CPT

## 2022-03-15 PROCEDURE — 97116 GAIT TRAINING THERAPY: CPT

## 2022-03-15 DEVICE — COMPONENT ARTC SURF PS 10-12 GH 10 MM LT TIB FIX BEAR: Type: IMPLANTABLE DEVICE | Site: KNEE | Status: FUNCTIONAL

## 2022-03-15 DEVICE — CEMENT BNE 40GM HI VISC RADPQ FOR REV SURG: Type: IMPLANTABLE DEVICE | Site: KNEE | Status: FUNCTIONAL

## 2022-03-15 DEVICE — COMPONENT FEM SZ 11 STD L KNEE CO CHROM CEM POST STBL MID: Type: IMPLANTABLE DEVICE | Site: KNEE | Status: FUNCTIONAL

## 2022-03-15 DEVICE — PSN TIB STM 5 DEG SZ H L: Type: IMPLANTABLE DEVICE | Site: KNEE | Status: FUNCTIONAL

## 2022-03-15 DEVICE — SYSTEM TOT KNEE CEM FEM TIB COMP STD TIB INSRT STD PAT: Type: IMPLANTABLE DEVICE | Site: KNEE | Status: FUNCTIONAL

## 2022-03-15 DEVICE — COMPONENT PAT DIA32MM THK8.5MM KNEE POLY CEM CONVENTIONAL: Type: IMPLANTABLE DEVICE | Site: KNEE | Status: FUNCTIONAL

## 2022-03-15 RX ORDER — HYDROMORPHONE HCL 110MG/55ML
0.5 PATIENT CONTROLLED ANALGESIA SYRINGE INTRAVENOUS EVERY 5 MIN PRN
Status: DISCONTINUED | OUTPATIENT
Start: 2022-03-15 | End: 2022-03-15 | Stop reason: HOSPADM

## 2022-03-15 RX ORDER — OXYCODONE HYDROCHLORIDE 5 MG/1
5 TABLET ORAL
Status: DISCONTINUED | OUTPATIENT
Start: 2022-03-15 | End: 2022-03-15 | Stop reason: HOSPADM

## 2022-03-15 RX ORDER — SODIUM CHLORIDE 0.9 % (FLUSH) 0.9 %
10 SYRINGE (ML) INJECTION EVERY 12 HOURS SCHEDULED
Status: DISCONTINUED | OUTPATIENT
Start: 2022-03-15 | End: 2022-03-15 | Stop reason: HOSPADM

## 2022-03-15 RX ORDER — MORPHINE SULFATE 2 MG/ML
2 INJECTION, SOLUTION INTRAMUSCULAR; INTRAVENOUS
Status: DISCONTINUED | OUTPATIENT
Start: 2022-03-15 | End: 2022-03-16 | Stop reason: HOSPADM

## 2022-03-15 RX ORDER — LEVALBUTEROL TARTRATE 45 UG/1
2 AEROSOL, METERED ORAL EVERY 4 HOURS PRN
Status: DISCONTINUED | OUTPATIENT
Start: 2022-03-15 | End: 2022-03-15 | Stop reason: CLARIF

## 2022-03-15 RX ORDER — ONDANSETRON 2 MG/ML
4 INJECTION INTRAMUSCULAR; INTRAVENOUS
Status: DISCONTINUED | OUTPATIENT
Start: 2022-03-15 | End: 2022-03-15 | Stop reason: HOSPADM

## 2022-03-15 RX ORDER — LABETALOL HYDROCHLORIDE 5 MG/ML
10 INJECTION, SOLUTION INTRAVENOUS
Status: DISCONTINUED | OUTPATIENT
Start: 2022-03-15 | End: 2022-03-15 | Stop reason: HOSPADM

## 2022-03-15 RX ORDER — ROPIVACAINE HYDROCHLORIDE 5 MG/ML
INJECTION, SOLUTION EPIDURAL; INFILTRATION; PERINEURAL
Status: COMPLETED | OUTPATIENT
Start: 2022-03-15 | End: 2022-03-15

## 2022-03-15 RX ORDER — SODIUM CHLORIDE 0.9 % (FLUSH) 0.9 %
10 SYRINGE (ML) INJECTION EVERY 12 HOURS SCHEDULED
Status: DISCONTINUED | OUTPATIENT
Start: 2022-03-15 | End: 2022-03-16 | Stop reason: HOSPADM

## 2022-03-15 RX ORDER — SODIUM CHLORIDE 0.9 % (FLUSH) 0.9 %
5-40 SYRINGE (ML) INJECTION PRN
Status: DISCONTINUED | OUTPATIENT
Start: 2022-03-15 | End: 2022-03-15 | Stop reason: HOSPADM

## 2022-03-15 RX ORDER — MAGNESIUM HYDROXIDE 1200 MG/15ML
LIQUID ORAL CONTINUOUS PRN
Status: COMPLETED | OUTPATIENT
Start: 2022-03-15 | End: 2022-03-15

## 2022-03-15 RX ORDER — ATORVASTATIN CALCIUM 10 MG/1
10 TABLET, FILM COATED ORAL NIGHTLY
Status: DISCONTINUED | OUTPATIENT
Start: 2022-03-15 | End: 2022-03-16 | Stop reason: HOSPADM

## 2022-03-15 RX ORDER — SODIUM CHLORIDE, SODIUM LACTATE, POTASSIUM CHLORIDE, CALCIUM CHLORIDE 600; 310; 30; 20 MG/100ML; MG/100ML; MG/100ML; MG/100ML
INJECTION, SOLUTION INTRAVENOUS CONTINUOUS
Status: DISCONTINUED | OUTPATIENT
Start: 2022-03-15 | End: 2022-03-16

## 2022-03-15 RX ORDER — SODIUM CHLORIDE 0.9 % (FLUSH) 0.9 %
10 SYRINGE (ML) INJECTION PRN
Status: DISCONTINUED | OUTPATIENT
Start: 2022-03-15 | End: 2022-03-16 | Stop reason: HOSPADM

## 2022-03-15 RX ORDER — SODIUM CHLORIDE 0.9 % (FLUSH) 0.9 %
10 SYRINGE (ML) INJECTION PRN
Status: DISCONTINUED | OUTPATIENT
Start: 2022-03-15 | End: 2022-03-15 | Stop reason: HOSPADM

## 2022-03-15 RX ORDER — METFORMIN HYDROCHLORIDE 500 MG/1
500 TABLET, EXTENDED RELEASE ORAL
Status: DISCONTINUED | OUTPATIENT
Start: 2022-03-16 | End: 2022-03-16 | Stop reason: HOSPADM

## 2022-03-15 RX ORDER — SODIUM CHLORIDE 0.9 % (FLUSH) 0.9 %
5-40 SYRINGE (ML) INJECTION EVERY 12 HOURS SCHEDULED
Status: DISCONTINUED | OUTPATIENT
Start: 2022-03-15 | End: 2022-03-15 | Stop reason: HOSPADM

## 2022-03-15 RX ORDER — SODIUM CHLORIDE 9 MG/ML
25 INJECTION, SOLUTION INTRAVENOUS PRN
Status: DISCONTINUED | OUTPATIENT
Start: 2022-03-15 | End: 2022-03-15 | Stop reason: HOSPADM

## 2022-03-15 RX ORDER — SODIUM CHLORIDE 9 MG/ML
INJECTION, SOLUTION INTRAVENOUS CONTINUOUS PRN
Status: DISCONTINUED | OUTPATIENT
Start: 2022-03-15 | End: 2022-03-15 | Stop reason: SDUPTHER

## 2022-03-15 RX ORDER — ONDANSETRON 2 MG/ML
4 INJECTION INTRAMUSCULAR; INTRAVENOUS EVERY 6 HOURS PRN
Status: DISCONTINUED | OUTPATIENT
Start: 2022-03-15 | End: 2022-03-16 | Stop reason: HOSPADM

## 2022-03-15 RX ORDER — BUPIVACAINE HYDROCHLORIDE 5 MG/ML
INJECTION, SOLUTION EPIDURAL; INTRACAUDAL PRN
Status: DISCONTINUED | OUTPATIENT
Start: 2022-03-15 | End: 2022-03-15 | Stop reason: SDUPTHER

## 2022-03-15 RX ORDER — ALLOPURINOL 100 MG/1
300 TABLET ORAL DAILY
Status: DISCONTINUED | OUTPATIENT
Start: 2022-03-15 | End: 2022-03-16 | Stop reason: HOSPADM

## 2022-03-15 RX ORDER — BRIMONIDINE TARTRATE 2 MG/ML
1 SOLUTION/ DROPS OPHTHALMIC 2 TIMES DAILY
Status: DISCONTINUED | OUTPATIENT
Start: 2022-03-15 | End: 2022-03-16 | Stop reason: HOSPADM

## 2022-03-15 RX ORDER — ALBUTEROL SULFATE 90 UG/1
2 AEROSOL, METERED RESPIRATORY (INHALATION) EVERY 4 HOURS PRN
Status: DISCONTINUED | OUTPATIENT
Start: 2022-03-15 | End: 2022-03-16 | Stop reason: HOSPADM

## 2022-03-15 RX ORDER — METOPROLOL SUCCINATE 25 MG/1
25 TABLET, EXTENDED RELEASE ORAL DAILY
Status: DISCONTINUED | OUTPATIENT
Start: 2022-03-16 | End: 2022-03-16 | Stop reason: HOSPADM

## 2022-03-15 RX ORDER — OXYCODONE HYDROCHLORIDE 5 MG/1
5 TABLET ORAL EVERY 4 HOURS PRN
Status: DISCONTINUED | OUTPATIENT
Start: 2022-03-15 | End: 2022-03-16 | Stop reason: HOSPADM

## 2022-03-15 RX ORDER — DONEPEZIL HYDROCHLORIDE 5 MG/1
10 TABLET, FILM COATED ORAL NIGHTLY
Status: DISCONTINUED | OUTPATIENT
Start: 2022-03-15 | End: 2022-03-16 | Stop reason: HOSPADM

## 2022-03-15 RX ORDER — ACETAMINOPHEN 325 MG/1
650 TABLET ORAL EVERY 6 HOURS
Status: DISCONTINUED | OUTPATIENT
Start: 2022-03-15 | End: 2022-03-16 | Stop reason: HOSPADM

## 2022-03-15 RX ORDER — CLONAZEPAM 0.5 MG/1
2 TABLET ORAL NIGHTLY
Status: DISCONTINUED | OUTPATIENT
Start: 2022-03-15 | End: 2022-03-16 | Stop reason: HOSPADM

## 2022-03-15 RX ORDER — MIDAZOLAM HYDROCHLORIDE 1 MG/ML
INJECTION INTRAMUSCULAR; INTRAVENOUS PRN
Status: DISCONTINUED | OUTPATIENT
Start: 2022-03-15 | End: 2022-03-15 | Stop reason: SDUPTHER

## 2022-03-15 RX ORDER — TRANEXAMIC ACID 650 1/1
1950 TABLET ORAL ONCE
Status: COMPLETED | OUTPATIENT
Start: 2022-03-15 | End: 2022-03-15

## 2022-03-15 RX ORDER — LEVOTHYROXINE SODIUM 0.1 MG/1
100 TABLET ORAL DAILY
Status: DISCONTINUED | OUTPATIENT
Start: 2022-03-15 | End: 2022-03-16 | Stop reason: HOSPADM

## 2022-03-15 RX ORDER — OXYCODONE HYDROCHLORIDE 5 MG/1
10 TABLET ORAL EVERY 4 HOURS PRN
Status: DISCONTINUED | OUTPATIENT
Start: 2022-03-15 | End: 2022-03-16 | Stop reason: HOSPADM

## 2022-03-15 RX ORDER — ONDANSETRON 4 MG/1
4 TABLET, ORALLY DISINTEGRATING ORAL EVERY 8 HOURS PRN
Status: DISCONTINUED | OUTPATIENT
Start: 2022-03-15 | End: 2022-03-16 | Stop reason: HOSPADM

## 2022-03-15 RX ORDER — SODIUM CHLORIDE, SODIUM LACTATE, POTASSIUM CHLORIDE, CALCIUM CHLORIDE 600; 310; 30; 20 MG/100ML; MG/100ML; MG/100ML; MG/100ML
INJECTION, SOLUTION INTRAVENOUS CONTINUOUS
Status: DISCONTINUED | OUTPATIENT
Start: 2022-03-15 | End: 2022-03-15

## 2022-03-15 RX ORDER — ASPIRIN 81 MG/1
81 TABLET ORAL 2 TIMES DAILY
Status: DISCONTINUED | OUTPATIENT
Start: 2022-03-15 | End: 2022-03-16 | Stop reason: HOSPADM

## 2022-03-15 RX ORDER — FENTANYL CITRATE 50 UG/ML
25 INJECTION, SOLUTION INTRAMUSCULAR; INTRAVENOUS EVERY 5 MIN PRN
Status: DISCONTINUED | OUTPATIENT
Start: 2022-03-15 | End: 2022-03-15 | Stop reason: HOSPADM

## 2022-03-15 RX ORDER — SODIUM CHLORIDE 9 MG/ML
25 INJECTION, SOLUTION INTRAVENOUS PRN
Status: DISCONTINUED | OUTPATIENT
Start: 2022-03-15 | End: 2022-03-16 | Stop reason: HOSPADM

## 2022-03-15 RX ORDER — LIDOCAINE HYDROCHLORIDE 10 MG/ML
1 INJECTION, SOLUTION EPIDURAL; INFILTRATION; INTRACAUDAL; PERINEURAL
Status: DISCONTINUED | OUTPATIENT
Start: 2022-03-15 | End: 2022-03-15 | Stop reason: HOSPADM

## 2022-03-15 RX ORDER — METOCLOPRAMIDE HYDROCHLORIDE 5 MG/ML
10 INJECTION INTRAMUSCULAR; INTRAVENOUS
Status: DISCONTINUED | OUTPATIENT
Start: 2022-03-15 | End: 2022-03-15 | Stop reason: HOSPADM

## 2022-03-15 RX ORDER — PROPOFOL 10 MG/ML
INJECTION, EMULSION INTRAVENOUS CONTINUOUS PRN
Status: DISCONTINUED | OUTPATIENT
Start: 2022-03-15 | End: 2022-03-15 | Stop reason: SDUPTHER

## 2022-03-15 RX ORDER — HYDRALAZINE HYDROCHLORIDE 20 MG/ML
10 INJECTION INTRAMUSCULAR; INTRAVENOUS
Status: DISCONTINUED | OUTPATIENT
Start: 2022-03-15 | End: 2022-03-15 | Stop reason: HOSPADM

## 2022-03-15 RX ORDER — TRANEXAMIC ACID 650 1/1
1950 TABLET ORAL ONCE
Status: COMPLETED | OUTPATIENT
Start: 2022-03-16 | End: 2022-03-16

## 2022-03-15 RX ORDER — BUDESONIDE AND FORMOTEROL FUMARATE DIHYDRATE 160; 4.5 UG/1; UG/1
2 AEROSOL RESPIRATORY (INHALATION) 2 TIMES DAILY
Status: DISCONTINUED | OUTPATIENT
Start: 2022-03-15 | End: 2022-03-16 | Stop reason: HOSPADM

## 2022-03-15 RX ADMIN — ONDANSETRON 4 MG: 2 INJECTION INTRAMUSCULAR; INTRAVENOUS at 10:57

## 2022-03-15 RX ADMIN — MIDAZOLAM HYDROCHLORIDE 2 MG: 2 INJECTION, SOLUTION INTRAMUSCULAR; INTRAVENOUS at 07:00

## 2022-03-15 RX ADMIN — ACETAMINOPHEN 650 MG: 325 TABLET ORAL at 10:57

## 2022-03-15 RX ADMIN — ROPIVACAINE HYDROCHLORIDE 20 ML: 5 INJECTION, SOLUTION EPIDURAL; INFILTRATION; PERINEURAL at 07:20

## 2022-03-15 RX ADMIN — OXYCODONE 5 MG: 5 TABLET ORAL at 15:56

## 2022-03-15 RX ADMIN — BUPIVACAINE HYDROCHLORIDE 2.2 ML: 5 INJECTION, SOLUTION EPIDURAL; INTRACAUDAL; PERINEURAL at 07:40

## 2022-03-15 RX ADMIN — BUDESONIDE AND FORMOTEROL FUMARATE DIHYDRATE 2 PUFF: 160; 4.5 AEROSOL RESPIRATORY (INHALATION) at 18:00

## 2022-03-15 RX ADMIN — TRANEXAMIC ACID 1950 MG: 650 TABLET ORAL at 06:54

## 2022-03-15 RX ADMIN — CEFAZOLIN SODIUM 2000 MG: 1 INJECTION, POWDER, FOR SOLUTION INTRAMUSCULAR; INTRAVENOUS at 15:58

## 2022-03-15 RX ADMIN — ALLOPURINOL 300 MG: 100 TABLET ORAL at 12:20

## 2022-03-15 RX ADMIN — SODIUM CHLORIDE, POTASSIUM CHLORIDE, SODIUM LACTATE AND CALCIUM CHLORIDE: 600; 310; 30; 20 INJECTION, SOLUTION INTRAVENOUS at 06:54

## 2022-03-15 RX ADMIN — PROPOFOL 100 MCG/KG/MIN: 10 INJECTION, EMULSION INTRAVENOUS at 07:56

## 2022-03-15 RX ADMIN — ACETAMINOPHEN 650 MG: 325 TABLET ORAL at 15:55

## 2022-03-15 RX ADMIN — CLONAZEPAM 2 MG: 0.5 TABLET ORAL at 20:46

## 2022-03-15 RX ADMIN — ASPIRIN 81 MG: 81 TABLET, COATED ORAL at 10:58

## 2022-03-15 RX ADMIN — DONEPEZIL HYDROCHLORIDE 10 MG: 5 TABLET, FILM COATED ORAL at 20:47

## 2022-03-15 RX ADMIN — OXYCODONE 5 MG: 5 TABLET ORAL at 10:58

## 2022-03-15 RX ADMIN — ASPIRIN 81 MG: 81 TABLET, COATED ORAL at 20:47

## 2022-03-15 RX ADMIN — TRANEXAMIC ACID 1950 MG: 650 TABLET ORAL at 14:28

## 2022-03-15 RX ADMIN — SODIUM CHLORIDE: 9 INJECTION, SOLUTION INTRAVENOUS at 07:00

## 2022-03-15 RX ADMIN — OXYCODONE 10 MG: 5 TABLET ORAL at 20:46

## 2022-03-15 RX ADMIN — ATORVASTATIN CALCIUM 10 MG: 10 TABLET, FILM COATED ORAL at 20:47

## 2022-03-15 RX ADMIN — CEFAZOLIN 2000 MG: 1 INJECTION, POWDER, FOR SOLUTION INTRAMUSCULAR; INTRAVENOUS at 07:54

## 2022-03-15 RX ADMIN — MORPHINE SULFATE 2 MG: 2 INJECTION, SOLUTION INTRAMUSCULAR; INTRAVENOUS at 12:23

## 2022-03-15 ASSESSMENT — PULMONARY FUNCTION TESTS
PIF_VALUE: 0
PIF_VALUE: 1
PIF_VALUE: 0
PIF_VALUE: 0
PIF_VALUE: 1
PIF_VALUE: 0
PIF_VALUE: 1
PIF_VALUE: 0
PIF_VALUE: 1
PIF_VALUE: 1
PIF_VALUE: 0
PIF_VALUE: 1
PIF_VALUE: 0
PIF_VALUE: 1
PIF_VALUE: 0
PIF_VALUE: 2
PIF_VALUE: 1
PIF_VALUE: 0
PIF_VALUE: 1
PIF_VALUE: 0
PIF_VALUE: 1
PIF_VALUE: 0
PIF_VALUE: 0
PIF_VALUE: 1
PIF_VALUE: 0
PIF_VALUE: 1
PIF_VALUE: 0
PIF_VALUE: 1
PIF_VALUE: 1
PIF_VALUE: 0
PIF_VALUE: 1
PIF_VALUE: 0
PIF_VALUE: 1
PIF_VALUE: 0
PIF_VALUE: 0
PIF_VALUE: 1
PIF_VALUE: 1
PIF_VALUE: 0
PIF_VALUE: 1
PIF_VALUE: 0
PIF_VALUE: 1
PIF_VALUE: 0
PIF_VALUE: 1
PIF_VALUE: 1
PIF_VALUE: 0
PIF_VALUE: 1
PIF_VALUE: 1
PIF_VALUE: 0
PIF_VALUE: 1
PIF_VALUE: 0
PIF_VALUE: 0
PIF_VALUE: 1
PIF_VALUE: 1
PIF_VALUE: 0
PIF_VALUE: 0

## 2022-03-15 ASSESSMENT — PAIN DESCRIPTION - LOCATION: LOCATION: KNEE

## 2022-03-15 ASSESSMENT — PAIN SCALES - GENERAL
PAINLEVEL_OUTOF10: 2
PAINLEVEL_OUTOF10: 7
PAINLEVEL_OUTOF10: 0
PAINLEVEL_OUTOF10: 0
PAINLEVEL_OUTOF10: 7
PAINLEVEL_OUTOF10: 5
PAINLEVEL_OUTOF10: 5
PAINLEVEL_OUTOF10: 7
PAINLEVEL_OUTOF10: 0

## 2022-03-15 ASSESSMENT — PAIN SCALES - WONG BAKER
WONGBAKER_NUMERICALRESPONSE: 0

## 2022-03-15 ASSESSMENT — PAIN DESCRIPTION - ORIENTATION: ORIENTATION: LEFT

## 2022-03-15 ASSESSMENT — PAIN DESCRIPTION - DESCRIPTORS: DESCRIPTORS: THROBBING

## 2022-03-15 ASSESSMENT — PAIN - FUNCTIONAL ASSESSMENT: PAIN_FUNCTIONAL_ASSESSMENT: 0-10

## 2022-03-15 ASSESSMENT — PAIN DESCRIPTION - FREQUENCY: FREQUENCY: CONTINUOUS

## 2022-03-15 ASSESSMENT — PAIN DESCRIPTION - PAIN TYPE: TYPE: SURGICAL PAIN

## 2022-03-15 NOTE — ANESTHESIA PROCEDURE NOTES
Peripheral Block    Patient location during procedure: pre-op  Start time: 3/15/2022 7:10 AM  End time: 3/15/2022 7:20 AM  Staffing  Performed: anesthesiologist   Anesthesiologist: Kofi Tran MD  Preanesthetic Checklist  Completed: patient identified, IV checked, site marked, risks and benefits discussed, surgical consent, monitors and equipment checked, pre-op evaluation, timeout performed, anesthesia consent given, oxygen available and patient being monitored  Peripheral Block  Patient position: supine  Prep: ChloraPrep  Patient monitoring: cardiac monitor, continuous pulse ox, frequent blood pressure checks and IV access  Block type: Femoral  Laterality: left  Injection technique: single-shot  Guidance: nerve stimulator and ultrasound guided  Local infiltration: ropivacaine  Infiltration strength: 0.5 %  Dose: 20 mL  Adductor canal  Provider prep: mask and sterile gloves (Sterile probe cover)  Local infiltration: ropivacaine  Needle  Needle type: combined needle/nerve stimulator   Needle gauge: 22 G  Needle length: 5 cm  Needle localization: anatomical landmarks and ultrasound guidance  Assessment  Injection assessment: negative aspiration for heme, no paresthesia on injection and local visualized surrounding nerve on ultrasound  Paresthesia pain: immediately resolved  Slow fractionated injection: yes  Hemodynamics: stable  Additional Notes  Ultrasound image printed and saved in patient chart.     Sterile probe cover used    Medications Administered  Ropivacaine (NAROPIN) injection 0.5%, 20 mL  Reason for block: post-op pain management and at surgeon's request

## 2022-03-15 NOTE — PROGRESS NOTES
Patient admitted to room 212 from PACU. Admission completed. Orientation to room and call light. Call light within reach.

## 2022-03-15 NOTE — PROGRESS NOTES
Physical Therapy    Facility/Department: [facility] MED SURG UNIT  Initial Assessment -Post op eval    NAME: Abdoul Monahan  : 1950  MRN: [id number]    Date of Service: 3/15/2022    Discharge Recommendations:  Home with assist PRN,Home with Home health PT,Outpatient PT (Recommend home with family PRN assist; Audra Jhaveri PT & then outpt PT)        Assessment   Body structures, Functions, Activity limitations: Decreased functional mobility ; Decreased balance;Decreased ROM; Decreased endurance; Increased pain;Decreased strength  Assessment: Pt is a 81 yo male who underwent a left total knee replacement on 3/15/22. PT completed evaluation and assisted pt to side of bed. BP was 115/77 and pulse ox was 97%. Pt then ambulated from the bed to the recliner and was positioned for comfort. PT discussed POC to work on ROM and steps tomorrow and advance with gait distance. Pts left LE was elevated with pillow and advised to do APs. Treatment Diagnosis: s/p Left total knee replacement on 3/15/22  Prognosis: Good  Decision Making: Medium Complexity  REQUIRES PT FOLLOW UP: Yes  Activity Tolerance  Activity Tolerance: Patient limited by fatigue;Patient Tolerated treatment well       Patient Diagnosis(es): s/p Left total knee replacement    has a past medical history of COPD (chronic obstructive pulmonary disease) ([address] Diabetes mellitus ([address] Hyperlipidemia, Hypertension, Lung disease, and Meningitis spinal.   has a past surgical history that includes Thyroidectomy; Hand surgery (Right); Colonoscopy; pr colon ca scrn not hi rsk ind (N/A, 8/15/2018); sigmoidoscopy (N/A, 2019); and Total knee arthroplasty (Left, 3/15/2022).     Restrictions  Restrictions/Precautions  Restrictions/Precautions: Weight Bearing  Lower Extremity Weight Bearing Restrictions  Left Lower Extremity Weight Bearing: Weight Bearing As Tolerated (Knee immobilizer donned when OOB until can do SLR)  Vision/Hearing        Subjective  General  Chart Reviewed: Yes  Patient assessed for rehabilitation services?: Yes  Additional Pertinent Hx: Pt has a left total knee replacement 3/15/22  Family / Caregiver Present: No  Referring Practitioner: Dr Vahe Rucker  Referral Date : 03/15/22  Diagnosis: s/p Left total knee replacement 3/15/22  Subjective  Subjective: Pt reports 6-7/10 left knee pain \"throbbing\"  Pain Screening  Patient Currently in Pain: Yes  Pain Assessment  Pain Assessment: 0-10  Pain Level: 7  Pain Type: Surgical pain  Pain Location: Knee  Pain Orientation: Left  Pain Descriptors:  Throbbing  Pain Frequency: Continuous  Vital Signs  Patient Currently in Pain: Yes       Orientation     Social/Functional History  Social/Functional History  Lives With: Spouse  Type of Home: House  Home Layout: Multi-level  Home Access: Stairs to enter with rails  Entrance Stairs - Number of Steps: 3  Entrance Stairs - Rails: Left  Bathroom Shower/Tub: Walk-in shower  Bathroom Toilet: Handicap height  Bathroom Equipment: Hand-held shower,Grab bars in shower,Shower chair,Toilet raiser  Bathroom Accessibility: Accessible  Home Equipment: [facility] Help From: Family (Wife and family)  ADL Assistance: Independent  Homemaking Assistance: Independent  Homemaking Responsibilities: Yes  Ambulation Assistance: Independent  Transfer Assistance: Independent  Active : Yes  Mode of Transportation: [employer]  Occupation: Retired  Type of occupation: [employer] - retired after 30 years  Cognition        Objective     Observation/Palpation  Observation: L LE ace wrapped and knee immobilizer donned    AROM RLE (degrees)  RLE AROM: WNL  AROM LLE (degrees)  LLE General AROM: Left hip and left ankle WFL - able to sit bedside and in recliner; L LE not tested day of surgery; in knee immobilizer  AROM RUE (degrees)  RUE AROM : WFL  AROM LUE (degrees)  LUE AROM : WFL  Strength RLE  Strength RLE: WFL  Strength LLE  Comment: left hip and left ankle WFL - pt able to move his leg to side of bed with CGA  Strength RUE  Strength RUE: WFL  Strength LUE  Strength LUE: WFL        Bed mobility  Supine to Sit: Contact guard assistance  Transfers  Sit to Stand: Contact guard assistance  Stand to sit: Contact guard assistance;Minimal Assistance (Min A to scoot his L LE out in front of him)  Ambulation  Ambulation?: Yes  WB Status: WBAT L LE with knee immobilizer donned  More Ambulation?: Yes  Ambulation 1  Surface: level tile  Device: Rolling Walker  Assistance: Contact guard assistance  Quality of Gait: Pt ambulated WBAT L LE with a step to gait pattern with [employer].  Gait Deviations: Slow Bhavya;Decreased step length  Distance: 5 steps from bed to [address]  Times per week: 5-7  Times per day: Daily (1-2)  Plan weeks: 1-3 days and then home with assistance from family and C PT  Current Treatment Recommendations: Strengthening,Transfer Training,Endurance Training,Patient/Caregiver Education & Training,ROM,Manual Therapy - Soft Tissue Mobilization,Pain Management,Balance Training,Gait Training,Home Exercise Program,Modalities,Functional Mobility Training,Stair training,Safety Education & Training    G-Code       OutComes Score                                                  AM-PAC Score             Goals  Short term goals  Time Frame for Short term goals: 1-3 days of post op  Short term goal 1: Supervision with bed mobility  Short term goal 2: Supervision with gait  Short term goal 3: Pt able to ambulate with AD for 75' x 2 with supervision WBAT L LE  Short term goal 4: Pt able to ambulate up/down 3 steps with one HR so pt can safely enter/exit his home  Short term goal 5: Pt able to tolerate 10 reps of ther ex for B LEs to promote ROM of pts L LE and strength  Long term goals  Time Frame for Long term goals : Same as STGs  Patient Goals   Patient goals :  To be able to move better       Therapy Time   Individual Concurrent Group Co-treatment   Time In  1:05pm         Time Out  2:10pm Minutes  65 min                  [place], [address] [facility] [id number]

## 2022-03-15 NOTE — DISCHARGE INSTR - COC
Continuity of Care Form    Patient Name: Birgit Cornejo   :  1950  MRN:  [id number]    Admit date:  3/15/2022  Discharge date: 3/16/2022  Code Status Order: Full Code   Advance Directives:   Advance Care Flowsheet Documentation       Date/Time Healthcare Directive Type of Healthcare Directive Copy in [address] Agent's Name Healthcare Agent's Phone Number    03/15/22 7969 No, patient does not have an advance directive for healthcare treatment -- -- -- -- --            Admitting Physician:  Michelle Wu MD  PCP: Lorie Jo MD    Discharging Nurse: Abel Avila RN  [address] Unit/Room#: 0212/0212-01  Discharging Unit Phone Number: [phone]    Emergency Contact:   Extended Emergency Contact Information  Primary Emergency Contact: Timothy Mendoza[address] Phone: [phone]  Mobile Phone: [phone]  Relation: Spouse    Past Surgical History:  Past Surgical History:   Procedure Laterality Date    COLONOSCOPY      HAND SURGERY Right     [place] COLON CA SCRN NOT HI RSK IND N/A 8/15/2018    COLONOSCOPY performed by Willie Bear MD at [place] [address] 2019    SIGMOIDOSCOPY W/ DILATION performed by Willie Bear MD at [facility] Left 3/15/2022    LEFT TOTAL KNEE ARTHROPLASTY. SUPINE, MU PSI performed by Michelle Wu MD at [facility]       Immunization History: There is no immunization history for the selected administration types on file for this patient.     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    Type 2 diabetes mellitus (HCC) E11.9    Wheezing R06.2    Proctitis K62.89    AMS (altered mental status) R41.82    Hyperlipidemia E78.5    Hypothyroid E03.9    Hypertension I10    CKD (chronic kidney disease) N18.9    Viral encephalitis A86    Chest pain R07.9    Mild cognitive disorder F09    REM behavioral disorder G47.52    Altered mental status R41.82    Encephalopathy G93.40    Knee pain M25.569    Osteoarthritis of left knee M17.12    Type 2 diabetes mellitus without retinopathy (HCC) E11.9    Vision disorder H53.9    H/O total knee replacement, left T48.446       Isolation/Infection:   Isolation            No Isolation          Patient Infection Status       Infection Onset Added Last Indicated Last Indicated By Review Planned Expiration Resolved Resolved By    None active    Resolved    COVID-19 (Rule Out) 03/04/21 03/04/21 03/04/21 COVID-19, Rapid (Ordered)   03/04/21 Rule-Out Test Resulted    COVID-19 (Rule Out) 01/19/21 01/19/21 01/19/21 COVID-19 (Ordered)   01/19/21 Rule-Out Test Resulted            Nurse Assessment:  Last Vital Signs: /77   Pulse 59   Temp 97 °F (36.1 °C) (Tympanic)   Resp 16   SpO2 97%     Last documented pain score (0-10 scale): Pain Level: 7  Last Weight:   Wt Readings from Last 1 Encounters:   03/08/22 230 lb 6 oz (104.5 kg)     Mental Status:  oriented    IV Access:  - None    Nursing Mobility/ADLs:  Walking  Assisted  Transfer  Assisted  Bathing  Independent  Dressing  Independent  Toileting  Independent  Feeding  Independent  Med Admin  Independent  Med Delivery   whole    Wound Care Documentation and Therapy:        Elimination:  Continence:    Bowel: Yes  Bladder: Yes  Urinary Catheter: None   Colostomy/Ileostomy/Ileal Conduit: No       Date of Last BM: 3/15/2022  No intake or output data in the 24 hours ending 03/15/22 1234  No intake/output data recorded. Safety Concerns:     History of Falls (last 30 days) and At Risk for Falls    Impairments/Disabilities:      None    Nutrition Therapy:  Current Nutrition Therapy:   - Oral Diet:  General    Routes of Feeding: Oral  Liquids: No Restrictions  Daily Fluid Restriction: no  Last Modified Barium Swallow with Video (Video Swallowing Test): not done    Treatments at the Time of Hospital Discharge:   Respiratory Treatments: n/a    Oxygen Therapy:  is not on home oxygen therapy. Ventilator:    - No ventilator support  Rehab Therapies: Physical Therapy and Occupational Therapy  Weight Bearing Status/Restrictions: No weight bearing restrictions  Other Medical Equipment (for information only, NOT a DME order):  cane and walker  Other Treatments: n/a    Patient's personal belongings (please select all that are sent with patient):  None, Glasses    RN SIGNATURE:  Electronically signed by Estela Alston RN on 3/16/22 at 1:52 PM EDT    CASE MANAGEMENT/SOCIAL WORK SECTION        Readmission Risk Assessment Score:  Readmission Risk              Risk of Unplanned Readmission:  0           Discharging to Facility/ Agency   Name: [facility]   Address: [address]  Phone: [phone]  Fax: [phone]  Electronically signed by JAMES Vargas on 3/16/2022 at 12:56 PM      / signature: Electronically signed by Shena Mcgarry MD on 3/15/22 at 12:34 PM EDT    PHYSICIAN SECTION    Prognosis: Good    Condition at Discharge: Stable    Rehab Potential (if transferring to Rehab): Good    Recommended Labs or Other Treatments After Discharge: none    Physician Certification: I certify the above information and transfer of Bala Doe  is necessary for the continuing treatment of the diagnosis listed and that he requires [facility] for less 30 days. Update Admission H&P: No change in H&P    PHYSICIAN SIGNATURE:  Electronically signed by Ok Golden MD on 3/15/22 at 12:34 PM EDT

## 2022-03-15 NOTE — CONSULTS
Consult      Patient:  Pankaj Larson  YOB: 1950    MRN: [id number]     Acct: [de-identified]    Primary Care Physician: Ericka Courtney MD    HISTORY OF PRESENT ILLNESS:   History obtained from chart review and the patient. The patient is a 80 y.o. male whom I have been requested to see by Dr. Carly Kurtz for evaluation of HTN/DM. Patient was seeing and evaluated in postoperative period. Has long standing knees OA, underwent scheduled L TKA per ortho service. Denied fever, dyspnea, CP. Was compliant with medical Tx at home. Past Medical History:        Diagnosis Date    [facility]chronic obstructive [facility] disease) ([facility] [address] [facility]     Diabetes mellitus ([facility] [address] [facility]     Hyperlipidemia     Hypertension     Lung disease     Meningitis spinal        Past Surgical History:        Procedure Laterality Date    COLONOSCOPY      HAND SURGERY Right     [place] COLON CA SCRN NOT HI RSK IND N/A 8/15/2018    COLONOSCOPY performed by Juve Conti MD at [facility] 5/21/2019    SIGMOIDOSCOPY W/ DILATION performed by Juve Conti MD at [address] [place] [address]         Medications:    No current facility-administered medications on file prior to encounter.      Current Outpatient Medications on File Prior to Encounter   Medication Sig Dispense Refill    metFORMIN (GLUCOPHAGE-XR) 500 MG extended release tablet       metoprolol succinate (TOPROL XL) 25 MG extended release tablet       donepezil (ARICEPT) 10 MG tablet       budesonide-formoterol (SYMBICORT) 160-4.5 MCG/ACT AERO Inhale 2 puffs into the lungs 2 times daily      Respiratory Therapy Supplies SOURAV Change CPAP pressure to 6 cm   New CPAP mask and supplies 1 Device 0    levalbuterol (XOPENEX HFA) 45 MCG/ACT inhaler Inhale 2 puffs into the lungs every 4 hours as needed for Wheezing      brimonidine (ALPHAGAN) 0.2 % ophthalmic solution Place 1 drop into both eyes 2 times daily       lovastatin (MEVACOR) 40 MG tablet Take 40 mg by Temp 97 °F (36.1 °C) (Tympanic)   Resp 16   SpO2 97%   General:  Awake, alert, not in distress. Appears to be stated age. HEENT: Atraumatic, normocephalic. PERRLA. EOM intact. Pink conjunctiva, anicteric sclera. Pink and moist oral mucosa. No lymphadenopathy. Trachea midline. Thyroid non palpable. Neck supple. No carotid bruit. No JVD. Chest: Bilateal air entry, Clear to auscultation, no wheezing, rhonchi or rales. Cardiovascular: RRR, Q0Z1, no murmur, click, rub or gallop. No lower extremity edema. Pedal and posterior tibialis 2+. Abdomen: Soft, non tender to palpation. Active bowel sounds x 4 quadrants. Musculoskeletal: L knee postoperative dressing   Integumentary: Pink, warm and dry. Free from rash or lesions. CNS: Oriented to person, place and time. Cranial nerves 2-12 grossly intact. Speech clear. Face symmetrical. No tremor. Review of Labs and Diagnostic Testing:    CBC: No results for input(s): WBC, HGB, PLT in the last 72 hours. BMP:  No results for input(s): NA, K, CL, CO2, BUN, CREATININE, GLUCOSE in the last 72 hours. Calcium:No results for input(s): CALCIUM in the last 72 hours. Ionized Calcium:No results for input(s): IONCA in the last 72 hours. Magnesium:No results for input(s): MG in the last 72 hours. Phosphorus:No results for input(s): PHOS in the last 72 hours. BNP:No results for input(s): BNP in the last 72 hours. Glucose:  Recent Labs     03/15/22  0654   POCGLU 122*     HgbA1C: No results for input(s): LABA1C in the last 72 hours. INR: No results for input(s): INR in the last 72 hours. Hepatic: No results for input(s): ALKPHOS, ALT, AST, PROT, BILITOT, BILIDIR, LABALBU in the last 72 hours. Amylase and Lipase:No results for input(s): LACTA, AMYLASE in the last 72 hours. Lactic Acid: No results for input(s): LACTA in the last 72 hours. Troponin: No results for input(s): CKTOTAL, CKMB, TROPONINI in the last 72 hours.   BNP: No results for input(s): BNP in the last 72 hours. Lipids: No results for input(s): CHOL, TRIG, HDL, LDL, LDLCALC in the last 72 hours. ABGs: No results found for: PH, PCO2, PO2, HCO3, O2SAT        Assessment:    Active Problems:    * No active hospital problems. *  Resolved Problems:    * No resolved hospital problems. *          Plan:  1. OA, post L TKA- PT on case  2. HTN- home meds restarted, follow up clinically  3. DM- restart metformin,. Follow up with ACCU check  4. Dementia, cognitive declining- on aricept  5. Medically  stable for acute admission at [facility], will follow up along with primary service     Thank you Mimiabelardojuana Orellana for allowing me to participate in this patient's care.       Electronically signed by Lyndsey Young MD on 3/15/2022 at [address] [facility]

## 2022-03-15 NOTE — PROGRESS NOTES
Patient ID:  Rhina Gutierrez  [id number]  3 y.o.  1950    0945 Patient received in  PACU 2 Report received from Anesthesia and Surgical Nurse. Attached to Monitor, VSS, See Nursing Assessment and Flowsheets for detailed Information  10:02 Awake, following commands, answering questions appropriately. O2 Sats>92 on $L per NC. Xray Tech here for Knee xray's as ordered. 10:10 Taking po ice chips well,  Blood Sugar checked and is 131. Report called to RN  VSS.   10:20 Transported to Nursing Unit in apparent Stable Condition    Electronically signed by Alisa Del Castillo RN on 3/15/22

## 2022-03-15 NOTE — BRIEF OP NOTE
Brief Postoperative Note      Patient: Yun Church  YOB: 1950  MRN: [id number]    Date of Procedure: 3/15/2022    Pre-Op Diagnosis: LEFT KNEE DJD    Post-Op Diagnosis: Same       Procedure(s):  LEFT TOTAL KNEE ARTHROPLASTY. SUPINE, MU PSI    Surgeon(s):  Lori Foster MD    Assistant:  Physician Assistant: Bhavna Abebe PA-C    Anesthesia: Spinal    Estimated Blood Loss (mL): Minimal    Complications: None    Specimens:   * No specimens in log *    Implants:  Implant Name Type Inv. Item Serial No.  Lot No. LRB No. Used Action   CEMENT BNE 40GM HI VISC RADPQ FOR REV SURG - [id number]  CEMENT BNE 40GM HI VISC RADPQ FOR REV SURG  [employer]- [id number] Left 2 Implanted   COMPONENT ARTC SURF PS 10-12 GH 10 MM LT TIB FIX BEAR - [id number]  COMPONENT ARTC SURF PS 10-12 GH 10 MM LT TIB FIX BEAR  [employer]WD [id number] Left 1 Implanted   COMPONENT PAT [id number] THK8. 5MM KNEE POLY ELIJAH CONVENTIONAL - [id number]  COMPONENT PAT [id number] THK8. 5MM KNEE POLY ELIJAH CONVENTIONAL  [employer]WD [id number] Left 1 Implanted   PSN TIB STM 5 DEG SZ H L - [id number]  PSN TIB STM 5 DEG SZ H L  [employer]- [id number] Left 1 Implanted   COMPONENT FEM SZ 11 STD L KNEE CO CHROM ELIJAH POST STBL MID - [id number]  COMPONENT FEM SZ 11 STD L KNEE CO CHROM ELIJAH POST STBL MID  [employer]WD [id number] Left 1 Implanted         Drains: * No LDAs found *    Findings: none    Electronically signed by Lori Foster MD on 3/15/2022 at 9:23 AM

## 2022-03-15 NOTE — OP NOTE
[address] [address], [address]                                OPERATIVE REPORT    PATIENT NAME: Jessika Caballero                    :        1950  MED REC NO:   [id number]                              ROOM:       3292  ACCOUNT NO:   [de-identified]                           ADMIT DATE: 03/15/2022  PROVIDER:     Lobo Nielsen MD    DATE OF PROCEDURE:  03/15/2022    PROCEDURE:  Using Tamra cemented PSI, total knee arthroplasty. PREOPERATIVE DIAGNOSIS:  Left knee DJD. POSTOPERATIVE DIAGNOSIS:  Left knee DJD. PROCEDURES:  1. Left total knee arthroplasty using a Tamra size of 11 cemented  Persona posterior stabilized femoral component, size 8 cemented Persona  tibial tray with 10 mm Persona posterior stabilized polyethylene spacer  and 32 mm Persona all-poly patellar insert button. 2.  Utilization of Tamra PSI computer alignment system. SURGEON:  Lobo Nielsen MD    ASSISTANT:  Darryl Chandler PA-C    ANESTHESIA:  Spinal plus IV sedation. COMPLICATIONS:  None. EBL:  Minimal.    INDICATION:  The patient is a 78-year-old male with history of left knee  arthritis. He failed multiple conservative measures, elected to proceed  with total knee arthroplasty. Risks and benefits of total knee  replacements were discussed at length with the patient and family. These included infection, stiffness, nerve damage, continued pain and  deformity as well as need for subsequent operations. Understanding  these operation limitations, he elected to proceed. Informed consent  was obtained prior to arrival in the operating room. CLINICAL NOTE:  The patient has progressive knee pain which has been  refractory to conservative treatment. The patient has decided to  undergo elective total knee replacement using the Tamra PSI  computer-assisted custom cutting guides.  The risks, benefits, outcomes  and postoperative course were fully explained to the patient. We  discussed wear, loosening, infection, blood clot, loss of lift, loss of  limb, nerve damage, numbness, failure of the procedure, stiffness,  progressive arthritis and the need for potential revision knee  replacement. The patient understands the procedure, risks and benefits  and consents to this surgical intervention. OPERATION AND FINDINGS:  The patient was brought to the operating room  and placed on the surgical table in the supine position whereupon  adequate anesthesia was then obtained. A surgical time-out was  performed. The patient received preoperative antibiotics. The patient  was prepped and draped in the usual sterile manner. The leg was then  exsanguinated with an Esmarch bandage and the tourniquet was applied at  300 mmHg. A linear midline incision was made from the superior pole of the patella  to the tibial tubercle, identifying the entire extensor mechanism. A  medial parapatellar arthrotomy was performed and the patella was  everted. The fat pad was excised and once this was complete the patient  was noted to have significant hypertrophic synovium with degenerative  joint disease and large osteophyte formation. Medial and lateral femoral retractors were inserted. The Tamra PSI  custom cutting guide was applied and pinned into position. Once this  was complete, the guide was removed and the distal femoral resection was  performed. With the distal femoral resection complete, the pins were  inserted over the distal femur and the multipurpose cutting guide was  applied and screwed into position. With the guide in position, the  anterior, posterior and chamfer cuts were made without difficulty. Once  the femoral cuts were complete, a posterior retractor was inserted. The  medial and lateral tibial retractors were inserted and the meniscal  remnants were excised. The Tamra custom PSI cutting pin guide for the  tibia was pinned in position. The guide was removed and the tibial  resection was made after checking the extramedullary alignment with the  extramedullary alignment device. Once the tibial resection was complete, the tibial components were  inserted. Patellar reaming was performed. Peg holes were drilled in  the patella and a trial patella was applied. The knee was placed  through a range of motion and once appropriate stability was obtained,  trial components were removed. All bony surfaces were irrigated with copious amounts of saline  irrigation. The components were inserted as indicated. A deep drain  placed in the knee joint. The capsule was closed using interrupted #1,  the subcutaneous tissues were closed using #3-0 Monocryl and staples  were then used for the skin. Dry sterile bulky dressing applied. The  patient tolerated the procedure well, taken to the PACU good condition  without complication. Darryl Chandler PA-C was present throughout the entire case. Given the  nature of the disease process and the procedure, a skilled surgical  first assistant was necessary during the case. The assistant was  necessary to hold retractors and manipulate the extremity during the  procedure. A certified scrub tech was at the back table managing the  instruments and supplies for the surgical case.         Shama Baez MD    D: 03/15/2022 9:24:12       T: 03/15/2022 9:59:14     [email or web address]  Job#: [id number]     Doc#: [id number]    CC:

## 2022-03-15 NOTE — ANESTHESIA POSTPROCEDURE EVALUATION
Department of Anesthesiology  Postprocedure Note    Patient: Kari Ramon  MRN: [id number]  YOB: 1950  Date of evaluation: 3/15/2022  Time:  9:45 AM     Procedure Summary     Date: 03/15/22 Room / Location: [address]    Anesthesia Start: 5918 Anesthesia Stop: 1402    Procedure: LEFT TOTAL KNEE ARTHROPLASTY. SUPINE, MU PSI (Left Knee) Diagnosis: (LEFT KNEE DJD)    Surgeons: Reyna Bass MD Responsible Provider: Florentino Whitney MD    Anesthesia Type: regional, spinal ASA Status: 3          Anesthesia Type: regional, spinal    Colin Phase I:      Colin Phase II:      Last vitals: Reviewed and per EMR flowsheets.        Anesthesia Post Evaluation    Patient location during evaluation: bedside  Patient participation: complete - patient participated  Level of consciousness: awake and awake and alert  Airway patency: patent  Nausea & Vomiting: no nausea and no vomiting  Complications: no  Cardiovascular status: blood pressure returned to baseline and hemodynamically stable  Respiratory status: acceptable  Hydration status: euvolemic

## 2022-03-15 NOTE — ANESTHESIA PROCEDURE NOTES
Spinal Block    Patient location during procedure: pre-op  Start time: 3/15/2022 7:31 AM  End time: 3/15/2022 7:41 AM  Reason for block: primary anesthetic  Staffing  Performed: anesthesiologist   Anesthesiologist: Vinh Alicea MD  Preanesthetic Checklist  Completed: patient identified, IV checked, site marked, risks and benefits discussed, surgical consent, monitors and equipment checked, pre-op evaluation, timeout performed, anesthesia consent given, oxygen available and patient being monitored  Spinal Block  Patient position: sitting  Prep: ChloraPrep and site prepped and draped  Patient monitoring: continuous pulse ox and frequent blood pressure checks  Approach: midline  Location: L3/L4  Provider prep: mask and sterile gloves  Agent: bupivacaine  Dose: 2.2  Dose: 2.2  Needle  Needle type: Pencan   Needle gauge: 25 G  Needle length: 3.5 in  Assessment  Swirl obtained: Yes  CSF: clear  Attempts: 1  Hemodynamics: stable

## 2022-03-16 VITALS
DIASTOLIC BLOOD PRESSURE: 73 MMHG | TEMPERATURE: 97.9 F | BODY MASS INDEX: 29.58 KG/M2 | SYSTOLIC BLOOD PRESSURE: 128 MMHG | RESPIRATION RATE: 16 BRPM | HEART RATE: 77 BPM | HEIGHT: 74 IN | OXYGEN SATURATION: 93 %

## 2022-03-16 LAB
ANION GAP SERPL CALCULATED.3IONS-SCNC: 11 MEQ/L (ref 9–15)
BUN BLDV-MCNC: 24 MG/DL (ref 8–23)
CALCIUM SERPL-MCNC: 8.9 MG/DL (ref 8.5–9.9)
CHLORIDE BLD-SCNC: 101 MEQ/L (ref 95–107)
CO2: 23 MEQ/L (ref 20–31)
CREAT SERPL-MCNC: 1.55 MG/DL (ref 0.7–1.2)
GFR AFRICAN AMERICAN: 53.8
GFR NON-AFRICAN AMERICAN: 44.5
GLUCOSE BLD-MCNC: 157 MG/DL (ref 70–99)
GLUCOSE BLD-MCNC: 176 MG/DL (ref 70–99)
GLUCOSE BLD-MCNC: 181 MG/DL (ref 70–99)
HBA1C MFR BLD: 7.7 % (ref 4.8–5.9)
HCT VFR BLD CALC: 39.2 % (ref 42–52)
HEMOGLOBIN: 13.1 G/DL (ref 13.7–17.5)
MCH RBC QN AUTO: 28.4 PG (ref 25.7–32.2)
MCHC RBC AUTO-ENTMCNC: 33.4 % (ref 32.3–36.5)
MCV RBC AUTO: 85 FL (ref 79–92.2)
PDW BLD-RTO: 15.9 % (ref 11.6–14.4)
PERFORMED ON: ABNORMAL
PERFORMED ON: ABNORMAL
PLATELET # BLD: 175 K/UL (ref 163–337)
POTASSIUM REFLEX MAGNESIUM: 4.7 MEQ/L (ref 3.4–4.9)
RBC # BLD: 4.61 M/UL (ref 4.63–6.08)
SODIUM BLD-SCNC: 135 MEQ/L (ref 135–144)
WBC # BLD: 11 K/UL (ref 4.2–9)

## 2022-03-16 PROCEDURE — 36415 COLL VENOUS BLD VENIPUNCTURE: CPT

## 2022-03-16 PROCEDURE — 2580000003 HC RX 258: Performed by: ORTHOPAEDIC SURGERY

## 2022-03-16 PROCEDURE — G0378 HOSPITAL OBSERVATION PER HR: HCPCS

## 2022-03-16 PROCEDURE — 97110 THERAPEUTIC EXERCISES: CPT

## 2022-03-16 PROCEDURE — 85027 COMPLETE CBC AUTOMATED: CPT

## 2022-03-16 PROCEDURE — 83036 HEMOGLOBIN GLYCOSYLATED A1C: CPT

## 2022-03-16 PROCEDURE — 97116 GAIT TRAINING THERAPY: CPT

## 2022-03-16 PROCEDURE — 6360000002 HC RX W HCPCS: Performed by: ORTHOPAEDIC SURGERY

## 2022-03-16 PROCEDURE — 80048 BASIC METABOLIC PNL TOTAL CA: CPT

## 2022-03-16 PROCEDURE — 97535 SELF CARE MNGMENT TRAINING: CPT

## 2022-03-16 PROCEDURE — 97530 THERAPEUTIC ACTIVITIES: CPT

## 2022-03-16 PROCEDURE — 6370000000 HC RX 637 (ALT 250 FOR IP): Performed by: ORTHOPAEDIC SURGERY

## 2022-03-16 PROCEDURE — 6370000000 HC RX 637 (ALT 250 FOR IP): Performed by: INTERNAL MEDICINE

## 2022-03-16 PROCEDURE — 94640 AIRWAY INHALATION TREATMENT: CPT

## 2022-03-16 PROCEDURE — 97166 OT EVAL MOD COMPLEX 45 MIN: CPT

## 2022-03-16 RX ORDER — POLYETHYLENE GLYCOL 3350 17 G/17G
17 POWDER, FOR SOLUTION ORAL DAILY
Status: DISCONTINUED | OUTPATIENT
Start: 2022-03-16 | End: 2022-03-16 | Stop reason: HOSPADM

## 2022-03-16 RX ORDER — ASPIRIN 81 MG/1
81 TABLET ORAL 2 TIMES DAILY
Qty: 30 TABLET | Refills: 0 | Status: SHIPPED | OUTPATIENT
Start: 2022-03-16

## 2022-03-16 RX ADMIN — BRIMONIDINE TARTRATE 1 DROP: 2 SOLUTION/ DROPS OPHTHALMIC at 09:15

## 2022-03-16 RX ADMIN — SODIUM CHLORIDE, PRESERVATIVE FREE 10 ML: 5 INJECTION INTRAVENOUS at 09:16

## 2022-03-16 RX ADMIN — LEVOTHYROXINE SODIUM 100 MCG: 0.1 TABLET ORAL at 05:10

## 2022-03-16 RX ADMIN — MORPHINE SULFATE 2 MG: 2 INJECTION, SOLUTION INTRAMUSCULAR; INTRAVENOUS at 00:23

## 2022-03-16 RX ADMIN — BUDESONIDE AND FORMOTEROL FUMARATE DIHYDRATE 2 PUFF: 160; 4.5 AEROSOL RESPIRATORY (INHALATION) at 06:11

## 2022-03-16 RX ADMIN — MAGNESIUM HYDROXIDE 30 ML: 2400 SUSPENSION ORAL at 09:03

## 2022-03-16 RX ADMIN — ACETAMINOPHEN 650 MG: 325 TABLET ORAL at 10:28

## 2022-03-16 RX ADMIN — OXYCODONE 5 MG: 5 TABLET ORAL at 10:28

## 2022-03-16 RX ADMIN — OXYCODONE 10 MG: 5 TABLET ORAL at 05:10

## 2022-03-16 RX ADMIN — ALLOPURINOL 300 MG: 100 TABLET ORAL at 08:38

## 2022-03-16 RX ADMIN — POLYETHYLENE GLYCOL 3350 17 G: 17 POWDER, FOR SOLUTION ORAL at 09:03

## 2022-03-16 RX ADMIN — METFORMIN HYDROCHLORIDE 500 MG: 500 TABLET, EXTENDED RELEASE ORAL at 08:38

## 2022-03-16 RX ADMIN — TRANEXAMIC ACID 1950 MG: 650 TABLET ORAL at 05:10

## 2022-03-16 RX ADMIN — ACETAMINOPHEN 650 MG: 325 TABLET ORAL at 05:10

## 2022-03-16 RX ADMIN — ASPIRIN 81 MG: 81 TABLET, COATED ORAL at 08:38

## 2022-03-16 RX ADMIN — ACETAMINOPHEN 650 MG: 325 TABLET ORAL at 00:24

## 2022-03-16 RX ADMIN — CEFAZOLIN SODIUM 2000 MG: 1 INJECTION, POWDER, FOR SOLUTION INTRAMUSCULAR; INTRAVENOUS at 00:25

## 2022-03-16 RX ADMIN — SODIUM CHLORIDE, POTASSIUM CHLORIDE, SODIUM LACTATE AND CALCIUM CHLORIDE: 600; 310; 30; 20 INJECTION, SOLUTION INTRAVENOUS at 05:07

## 2022-03-16 RX ADMIN — METOPROLOL SUCCINATE 25 MG: 25 TABLET, FILM COATED, EXTENDED RELEASE ORAL at 08:38

## 2022-03-16 ASSESSMENT — PAIN DESCRIPTION - ORIENTATION: ORIENTATION: RIGHT

## 2022-03-16 ASSESSMENT — PAIN DESCRIPTION - PAIN TYPE: TYPE: SURGICAL PAIN

## 2022-03-16 ASSESSMENT — PAIN SCALES - GENERAL
PAINLEVEL_OUTOF10: 7
PAINLEVEL_OUTOF10: 0
PAINLEVEL_OUTOF10: 6
PAINLEVEL_OUTOF10: 5
PAINLEVEL_OUTOF10: 6

## 2022-03-16 ASSESSMENT — PAIN DESCRIPTION - LOCATION: LOCATION: KNEE

## 2022-03-16 NOTE — PROGRESS NOTES
Hospitalist Progress Note      PCP: Pankaj Soto MD    Date of Admission: 3/15/2022    Chief Complaint: weakness, pain, constipation    Subjective: pt siting I bed, eating breakfast     Medications:  Reviewed    Infusion Medications    sodium chloride       Scheduled Medications    magnesium hydroxide  30 mL Oral Daily    polyethylene glycol  17 g Oral Daily    sodium chloride flush  10 mL IntraVENous 2 times per day    acetaminophen  650 mg Oral Q6H    aspirin  81 mg Oral BID    allopurinol  300 mg Oral Daily    brimonidine  1 drop Both Eyes BID    budesonide-formoterol  2 puff Inhalation BID    clonazePAM  2 mg Oral Nightly    donepezil  10 mg Oral Nightly    levothyroxine  100 mcg Oral Daily    atorvastatin  10 mg Oral Nightly    metFORMIN  500 mg Oral Daily with breakfast    metoprolol succinate  25 mg Oral Daily    insulin lispro  0-12 Units SubCUTAneous TID WC    insulin lispro  0-6 Units SubCUTAneous Nightly     PRN Meds: sodium chloride flush, sodium chloride, oxyCODONE **OR** oxyCODONE, morphine, ondansetron **OR** ondansetron, albuterol sulfate HFA      Intake/Output Summary (Last 24 hours) at 3/16/2022 0808  Last data filed at 3/15/2022 1611  Gross per 24 hour   Intake 305.77 ml   Output --   Net 305.77 ml       Exam:    /73   Pulse 77   Temp 97.9 °F (36.6 °C) (Oral)   Resp 16   Ht 6' 2\" (1.88 m)   SpO2 93%   BMI 29.58 kg/m²     General appearance: No apparent distress, appears stated age and cooperative. HEENT: Pupils equal, round, and reactive to light. Conjunctivae/corneas clear. Neck: Supple, with full range of motion. No jugular venous distention. Trachea midline. Respiratory:  Normal respiratory effort. Clear to auscultation, bilaterally without Rales/Wheezes/Rhonchi. Cardiovascular: Regular rate and rhythm with normal S1/S2 without murmurs, rubs or gallops. Abdomen: Soft, non-tender, non-distended with normal bowel sounds.   Musculoskeletal: L leg postoperative intact dressing   Skin: Skin color, texture, turgor normal.  No rashes or lesions. Neurologic:  Neurovascularly intact without any focal sensory/motor deficits. Cranial nerves: II-XII intact, grossly non-focal.  Psychiatric: Alert and oriented, thought content appropriate, normal insight  Capillary Refill: Brisk,< 3 seconds   Peripheral Pulses: +2 palpable, equal bilaterally       Labs:   Recent Labs     03/16/22  0548   WBC 11.0*   HGB 13.1*   HCT 39.2*        Recent Labs     03/16/22  0548      K 4.7      CO2 23   BUN 24*   CREATININE 1.55*   CALCIUM 8.9     No results for input(s): AST, ALT, BILIDIR, BILITOT, ALKPHOS in the last 72 hours. No results for input(s): INR in the last 72 hours. No results for input(s): Sera Ends in the last 72 hours. Urinalysis:      Lab Results   Component Value Date    NITRU Negative 03/08/2022    RBCUA 3-5 03/04/2021    BLOODU Negative 03/08/2022    SPECGRAV 1.014 03/08/2022    GLUCOSEU Negative 03/08/2022       Radiology:  XR KNEE LEFT (1-2 VIEWS)   Final Result      Postsurgical changes of left total knee arthroplasty. Assessment/Plan:    Active Hospital Problems    Diagnosis Date Noted    H/O total knee replacement, left [Z96.652] 03/15/2022         DVT Prophylaxis: ASA BID  Diet: ADULT DIET; Regular  Code Status: Full Code    PT/OT Eval Status: done  Dispo -  1.  OA, post L TKA POD x1-- PT on case, pain controlled  2. HTN- home meds restarted, follow up clinically  3. DM- restarted metformin,. Follow up with ACCU check  4. Dementia, cognitive declining- on aricept  5. Constipation- adding stool softeners  6.  Medically  stable for acute admission at [facility], will follow up along with primary service         Electronically signed by Hedy Herrera MD on 3/16/2022 at 8:08 AM

## 2022-03-16 NOTE — PLAN OF CARE
Problem: Falls - Risk of:  Goal: Will remain free from falls  Description: Will remain free from falls  Outcome: Met This Shift  Goal: Absence of physical injury  Description: Absence of physical injury  Outcome: Met This Shift     Problem: Safety:  Goal: Free from accidental physical injury  Description: Free from accidental physical injury  Outcome: Met This Shift  Goal: Free from intentional harm  Description: Free from intentional harm  Outcome: Met This Shift     Problem: Daily Care:  Goal: Daily care needs are met  Description: Daily care needs are met  Outcome: Met This Shift     Problem: Pain:  Description: Pain management should include both nonpharmacologic and pharmacologic interventions. Goal: Patient's pain/discomfort is manageable  Description: Patient's pain/discomfort is manageable  Outcome: Met This Shift  Goal: Pain level will decrease  Description: Pain level will decrease  Outcome: Met This Shift  Goal: Control of acute pain  Description: Control of acute pain  Outcome: Met This Shift  Goal: Control of chronic pain  Description: Control of chronic pain  Outcome: Met This Shift     Problem: Pain:  Description: Pain management should include both nonpharmacologic and pharmacologic interventions.   Goal: Patient's pain/discomfort is manageable  Description: Patient's pain/discomfort is manageable  Outcome: Met This Shift  Goal: Pain level will decrease  Description: Pain level will decrease  Outcome: Met This Shift  Goal: Control of acute pain  Description: Control of acute pain  Outcome: Met This Shift  Goal: Control of chronic pain  Description: Control of chronic pain  Outcome: Met This Shift     Problem: Skin Integrity:  Goal: Skin integrity will stabilize  Description: Skin integrity will stabilize  Outcome: Met This Shift     Problem: Discharge Planning:  Goal: Patients continuum of care needs are met  Description: Patients continuum of care needs are met  Outcome: Met This Shift Problem: Discharge Planning:  Goal: Discharged to appropriate level of care  Description: Discharged to appropriate level of care  Outcome: Ongoing     Problem: Mobility - Impaired:  Goal: Mobility will improve  Description: Mobility will improve  Outcome: Met This Shift     Problem: Infection - Surgical Site:  Goal: Will show no infection signs and symptoms  Description: Will show no infection signs and symptoms  Outcome: Met This Shift     Problem: Pain - Acute:  Goal: Pain level will decrease  Description: Pain level will decrease  Outcome: Met This Shift

## 2022-03-16 NOTE — PROGRESS NOTES
Occupational Therapy   Occupational Therapy Initial Assessment- Postop  Date: 3/16/2022   Patient Name: Carola Rodarte  MRN: [id number]     : 1950    Date of Service: 3/16/2022    Discharge Recommendations:  Home with [address] [place] [address] with assist PRN       Assessment   Performance deficits / Impairments: Decreased functional mobility ; Decreased ADL status; Decreased endurance;Decreased balance;Decreased high-level IADLs;Decreased coordination  Assessment: Pt is a 76y/o male PLOF lives with wife, was I/MI with ADL, whom presents to [facility] 2* L TKA. Pt demo's the above resulting perf def/impair and would benefit from OT skilled services to maximize strength/end and safety/I with ADL for safe d/c transition. Treatment Diagnosis: L TKA  Prognosis: Good  History: multi comorb  Exam: 6 perf def/impair  OT Education: OT Role;Plan of Care;Transfer Training  REQUIRES OT FOLLOW UP: Yes  Safety Devices  Safety Devices in place: Yes  Type of devices: All fall risk precautions in place           Patient Diagnosis(es): There were no encounter diagnoses. has a past medical history of COPD (chronic obstructive pulmonary disease) ([facility] [address] Diabetes mellitus ([facility] [address] Hyperlipidemia, Hypertension, Lung disease, and Meningitis spinal.   has a past surgical history that includes Thyroidectomy; Hand surgery (Right); Colonoscopy; pr colon ca scrn not hi rsk ind (N/A, 8/15/2018); sigmoidoscopy (N/A, 2019); and Total knee arthroplasty (Left, 3/15/2022).     Treatment Diagnosis: L TKA      Restrictions  Restrictions/Precautions  Restrictions/Precautions: Weight Bearing  Required Braces or Orthoses?: No (+SLR 3/16/22)  Lower Extremity Weight Bearing Restrictions  Left Lower Extremity Weight Bearing: Weight Bearing As Tolerated (Knee immobilizer donned when OOB until can do SLR)    Subjective   General  Chart Reviewed: Yes  Patient assessed for rehabilitation services?: Yes  Family / Caregiver Present: No  Referring Practitioner:  Mayte  Diagnosis: L TKA  Subjective  Subjective: Pt agreeable to OT eval and shower  Patient Currently in Pain: Yes  Pain Assessment  Pain Assessment: 0-10  Pain Level: 6  Pain Type: Surgical pain  Pain Location: Knee  Pain Orientation: Right  Vital Signs  Patient Currently in Pain: Yes  Social/Functional History  Social/Functional History  Lives With: Spouse  Type of Home: House  Home Layout: Multi-level  Home Access: Stairs to enter with rails  Entrance Stairs - Number of Steps: 3  Entrance Stairs - Rails: Left  Bathroom Shower/Tub: Walk-in shower  Bathroom Toilet: Handicap height  Bathroom Equipment: Hand-held shower,Grab bars in shower,Shower chair,Toilet raiser  Bathroom Accessibility: Accessible  Home Equipment: Rolling walker,Cane  Receives Help From: Family (Wife and family)  ADL Assistance: Independent  Homemaking Assistance: Independent  Homemaking Responsibilities: Yes  Ambulation Assistance: Independent  Transfer Assistance: Independent  Active : Yes  Mode of Transportation: Nonie Hamman  Occupation: Retired  Type of occupation: [address] [place] [address] - retired after 30 years       Objective        Orientation  Overall Orientation Status: Within Functional Limits  Observation/Palpation  Observation: L knee aquacel dressing in place  Functional Mobility  Functional - Mobility Device: Rolling Walker  Activity: To/from bathroom (2x)  Assist Level: Contact guard assistance  Functional Mobility Comments: slow pace  Toilet Transfers  Toilet - Technique: Ambulating  Equipment Used: Grab bars  Toilet Transfer:  (stood during urination with CGA/SBA)  Shower Transfers  Shower - Transfer From: [facility] - Transfer Type: To and From  Shower - Transfer To:  Shower seat with back  Shower - Technique: Sit pivot  Shower Transfers: Contact Guard  ADL  Feeding: Independent  Grooming: Independent  UE Bathing: Setup;Modified independent   LE Bathing: Contact guard assistance  UE Dressing: Independent  LE Dressing: Contact guard assistance;Minimal assistance  Toileting: Contact guard assistance  Tone RUE  RUE Tone: Normotonic  Tone LUE  LUE Tone: Normotonic  Coordination  Movements Are Fluid And Coordinated: Yes        Transfers  Sit to stand: Contact guard assistance  Stand to sit: Contact guard assistance                          LUE Strength  Gross LUE Strength: WFL  RUE Strength  Gross RUE Strength: WFL                   Plan   Plan  Times per week: 4-7x/wk  Times per day: Daily  Plan weeks: <1- postop pt  Current Treatment Recommendations: Strengthening,Balance Training,Functional Mobility Training,Endurance Training,Stair training,Pain Management,Safety Education & Training,Patient/Caregiver Education & Training,Self-Care / ADL,Home Management Training          Goals  Short term goals  Time Frame for Short term goals: 1-3 days- postop pt  Short term goal 1: I BUE HEP  Short term goal 2: Spv toileting  Short term goal 3: SBA LB dressing and bathing  Short term goal 4: SBA/Spv fxl ADL xfers  Long term goals  Time Frame for Long term goals : Same as STGs  Long term goal 1: Same as STGs  Long term goal 2: Same as STGs  Patient Goals   Patient goals :  \"To get home today\"       Therapy Time   Individual Concurrent Group Co-treatment   Time In  12:30         Time Out  1:40         Minutes  [id number] [facility], Meredith Cano

## 2022-03-16 NOTE — PROGRESS NOTES
Progress Note   TKA    Subjective:     Post-Operative Day: 1 Status Post left Total Knee Arthroplasty  Systemic or Specific Complaints:No Complaints    Objective:     CURRENT VITALS:  /73   Pulse 77   Temp 97.9 °F (36.6 °C) (Oral)   Resp 16   Ht 6' 2\" (1.88 m)   SpO2 93%   BMI 29.58 kg/m²     General: alert, appears stated age and cooperative   Wound: Wound clean and dry no evidence of infection. Motion: Painless Range of Motion   DVT Exam: No evidence of DVT seen on physical exam.       Knee swollen but thigh soft to palpation. Moving foot and ankle. Good distal pulses. Data Review  Recent Labs     03/16/22  0548   WBC 11.0*   RBC 4.61*   HGB 13.1*   HCT 39.2*   MCV 85.0   MCH 28.4   MCHC 33.4   RDW 15.9*            Assessment:     Status Post left Total Knee Arthroplasty. Doing well postoperatively.      Plan:      1: Continues current post-op course :  2:  Continue Deep venous thrombosis prophylaxis  3:  Continue physical therapy  4:  Continue Pain Control

## 2022-03-16 NOTE — PROGRESS NOTES
Physical Therapy  Facility/Department: [facility] MED SURG UNIT  Daily Treatment Note  NAME: Shirley Paz  : 1950  MRN: [id number]    Date of Service: 3/16/2022    Discharge Recommendations:  Home with assist PRN,Home with Home health PT,Outpatient PT (Recommend home with family PRN assist; [address] PT & then outpt PT)        Assessment   Body structures, Functions, Activity limitations: Decreased functional mobility ; Decreased balance;Decreased ROM; Decreased endurance; Increased pain;Decreased strength  Assessment: Inc time and explanation with activity including stair training as pt. has different set up at home split level and different entrances in garage HR on L and outside HR on R. Perofmred steps marked time with first trial L HR ascending and SPC and second trial R HR ascending and SPC all CGA and VC for sequencing with nonreciprical pattern. Very slow ambulation step to pattern CGA . Positive SLR. CP post to dec pain and assist with muscle recovery. Treatment Diagnosis: s/p Left total knee replacement on 3/15/22  Prognosis: Good  REQUIRES PT FOLLOW UP: Yes  Activity Tolerance  Activity Tolerance: Patient limited by fatigue;Patient Tolerated treatment well     Patient Diagnosis(es): There were no encounter diagnoses. has a past medical history of COPD (chronic obstructive pulmonary disease) ([address] Diabetes mellitus ([address] Hyperlipidemia, Hypertension, Lung disease, and Meningitis spinal.   has a past surgical history that includes Thyroidectomy; Hand surgery (Right); Colonoscopy; pr colon ca scrn not hi rsk ind (N/A, 8/15/2018); sigmoidoscopy (N/A, 2019); and Total knee arthroplasty (Left, 3/15/2022). Restrictions  Restrictions/Precautions  Restrictions/Precautions: Weight Bearing  Lower Extremity Weight Bearing Restrictions  Left Lower Extremity Weight Bearing: Weight Bearing As Tolerated (Knee immobilizer donned when OOB until can do SLR)  Subjective   General  Chart Reviewed:  Yes  Additional Pertinent Hx: Pt has a left total knee replacement 3/15/22  Family / Caregiver Present: No  Referring Practitioner: Dr Cam Mcintosh  Subjective  Subjective: Pt. sitting up in recliner and reports pain 6-7/10. Pt. is agreeable to PT session. Patient Observation  Observations: L bandage intact and dressing RN come to remove prior to ANA LILIA and gait        Orientation     Cognition      Objective      Transfers  Sit to Stand: Contact guard assistance  Stand to sit: Contact guard assistance  Ambulation  Ambulation?: Yes  WB Status: WBAT L LE with knee immobilizer donned  Ambulation 1  Surface: level tile  Device: Rolling Walker  Other Apparatus: Wheelchair follow  Assistance: Contact guard assistance  Quality of Gait: Pt ambulated WBAT L LE with a step to gait pattern with WW.VC to not get too close to front of [address] [facility] [facility]. Distance: 76' x 2   Comments: inc time    Stairs  Up down 5 stairs 2 trials 1 st trial L HR and SPC , second trial R HR and SPC as different set up depending on way to enter. Min A VC for sequencing Nonreciprical pattern. Exercises  Straight Leg Raise: LLE x 10   Quad Sets: BLE x 10 hold 3 sec   Heelslides: seated LLE x 10 hold 3-5 sec   Gluteal Sets: x20   Knee Long Arc Quad: LLE x 10, RLE x 10 hold 5 sec   Ankle Pumps: x20 seated    AROM LLE (degrees)  LLE General AROM: 3-84 deg         Cryotherapy (Minutes\Location): CP post to L knee to dec pain and inflammation.               Goals  Short term goals  Time Frame for Short term goals: 1-3 days of post op  Short term goal 1: Supervision with bed mobility  Short term goal 2: Supervision with gait  Short term goal 3: Pt able to ambulate with AD for 75' x 2 with supervision WBAT L LE  Short term goal 4: Pt able to ambulate up/down 3 steps with one HR so pt can safely enter/exit his home  Short term goal 5: Pt able to tolerate 10 reps of ther ex for B LEs to promote ROM of pts L LE and strength  Long term goals  Time Frame for Long term goals : Same as STGs  Patient Goals   Patient goals :  To be able to move better    Plan    Plan  Times per week: 5-7  Times per day: Daily (1-2)  Plan weeks: 1-3 days and then home with assistance from family and [facility]  Current Treatment Recommendations: Strengthening,Transfer Training,Endurance Training,Patient/Caregiver Education & Training,ROM,Manual Therapy - Soft Tissue Mobilization,Pain Management,Balance Training,Gait Training,Home Exercise Program,Modalities,Functional Mobility Training,Stair training,Safety Education & Training     Therapy Time   Individual Concurrent Group Co-treatment   Time In  0503         Time Out  [address] [place] [address], [facility]  License and [address] Number: [id number]

## 2022-03-16 NOTE — PROGRESS NOTES
Physical Therapy  Facility/Department: [facility] MED SURG UNIT  Daily Treatment Note  NAME: Sherry Mendez  : 1950  MRN: [id number]    Date of Service: 3/16/2022    Discharge Recommendations:  Home with assist PRN,Home with Home health PT,Outpatient PT (Recommend home with family PRN assist; [facility] & then outpt PT)        Assessment   Body structures, Functions, Activity limitations: Decreased functional mobility ; Decreased balance;Decreased ROM; Decreased endurance; Increased pain;Decreased strength  Assessment: Pt. limited by pain and fatigue this session. Tolerates seated ANA LILIA only with pain inc to 8/10. Pt. declined gait and stair training this P.M. due to pain and fatigue. Pt. to D/C home this afternoon. Treatment Diagnosis: s/p Left total knee replacement on 3/15/22  Prognosis: Good  REQUIRES PT FOLLOW UP: Yes  Activity Tolerance  Activity Tolerance: Patient limited by fatigue;Patient limited by pain     Patient Diagnosis(es): There were no encounter diagnoses. has a past medical history of COPD (chronic obstructive pulmonary disease) ([facility] [address] Diabetes mellitus ([address] Hyperlipidemia, Hypertension, Lung disease, and Meningitis spinal.   has a past surgical history that includes Thyroidectomy; Hand surgery (Right); Colonoscopy; pr colon ca scrn not hi rsk ind (N/A, 8/15/2018); sigmoidoscopy (N/A, 2019); and Total knee arthroplasty (Left, 3/15/2022). Restrictions  Restrictions/Precautions  Restrictions/Precautions: Weight Bearing  Required Braces or Orthoses?: No (+SLR 3/16/22)  Lower Extremity Weight Bearing Restrictions  Left Lower Extremity Weight Bearing: Weight Bearing As Tolerated (Knee immobilizer donned when OOB until can do SLR)  Subjective   General  Chart Reviewed: Yes  Additional Pertinent Hx: Pt has a left total knee replacement 3/15/22  Family / Caregiver Present: No  Referring Practitioner: Dr Kiley Muller  Subjective  Subjective: Pt. sitting up in recliner and agreeable to PT session. \"I'll try my knee is hurting. Pt. states L knee pain is 7/10. Pt .requesting pain medication. Pt. reporting fatigue post showering and toileting with OT. General Comment  Comments: RN called to check timing of pain medication and too early per RN. Patient Observation  Observations: L bandage intact and dressing RN come to remove prior to ANA LILIA and gait        Orientation     Cognition      Objective      Transfers  Sit to Stand: Contact guard assistance  Stand to sit: Contact guard assistance  Ambulation  Ambulation?: No (pt. declined due to fatigue )    Stairs/Curb  Stairs?: No (too fatigued pt. declined )  Stairs       Balance  Posture: Fair  Sitting - Static: Good  Sitting - Dynamic: Good  Standing - Static: Fair ([employer])  Standing - Dynamic: Fair [employer] )  Exercises  Straight Leg Raise: LLE x 10   Quad Sets: BLE x 10 hold 3 sec   Heelslides: seated LLE x 10   Gluteal Sets: x20   Hip Flexion: seated x 10   Hip Abduction: LLE x 10   Knee Long Arc Quad: LLE x 10, RLE x 10 hold 5 sec   Ankle Pumps: seated x 20    AROM LLE (degrees)  LLE General AROM: 3-84 deg         Cryotherapy (Minutes\Location): CP post to L knee to dec pain and inflammation. Goals  Short term goals  Time Frame for Short term goals: 1-3 days of post op  Short term goal 1: Supervision with bed mobility  Short term goal 2: Supervision with gait  Short term goal 3: Pt able to ambulate with AD for 75' x 2 with supervision WBAT L LE  Short term goal 4: Pt able to ambulate up/down 3 steps with one HR so pt can safely enter/exit his home  Short term goal 5: Pt able to tolerate 10 reps of ther ex for B LEs to promote ROM of pts L LE and strength  Long term goals  Time Frame for Long term goals : Same as STGs  Patient Goals   Patient goals :  To be able to move better    Plan    Plan  Times per week: 5-7  Times per day: Daily (1-2)  Plan weeks: 1-3 days and then home with assistance from family and [facility]  Current Treatment Recommendations: Strengthening,Transfer HUM[facility] Frankel Training,Patient/Caregiver Education & Training,ROM,Manual Therapy - Soft Tissue Mobilization,Pain Management,Balance Training,Gait Training,Home Exercise Program,Modalities,Functional Mobility Training,Stair training,Safety Education & Training     Therapy Time   Individual Concurrent Group Co-treatment   Time In  130         Time Out  200         Minutes  [address], [facility]  License and [address] Number: [id number]

## 2022-03-16 NOTE — PROGRESS NOTES
Tera Anderson Initial Discharge Assessment    Met with Patient to discuss discharge plan. PCP: Chu Alegre MD                                  Date of Last Visit: \"last months on the 10th\"    If no PCP, list provided? N/A    Discharge Planning    Living Arrangements: independently at home    Who do you live with? Spouse     Who helps you with your care:  self    If lives at home:     Do you have any barriers navigating in your home? yes - however, patient reports plan to stay on the first floor     Patient can perform ADL? Yes    Current Services (outpatient and in home) :  None    Dialysis: No    Is transportation available to get to your appointments? Yes    DME Equipment:  yes - walker, cane, shower bench, bed side commode, and c-pap    Respiratory equipment: CPAP without O2    Respiratory provider: Sid Vaughn:  yes - [facility]    Able to afford cost of medication: Yes    Does patient feel safe at home? Yes    Does patient identify any home going needs? Yes, [address]    Patient agreeable to [address]? Yes, [address]    Patient agreeable to SNF/Rehab? N/A    Other discharge needs identified? N/A    Was Freedom of Choice Provided? Yes    Initial Discharge Plan? (Note: please see concurrent daily documentation for any updates after initial note). Chart reviewed. Pateint was admitted to [facility] under observation status s/p left total knee replacement. Patient evaluated by PT/OT and therapies recommending [address] upon DC. This  met with patient to discuss DC planning and help at home needs. Upon visit patient is alert and oriented to person, place, and situation. Patient reports plan is to return home with spouse and [facility]. Shafer of choice provided. This socia worker spoke with patient's spouse via phone per patient's request.  Spouse reports feeling confident with patient retuning home and agreeable with C.   [facility] identified as agency of choice. No further help at home or DME needs identified by patient or spouse at this time. Spouse reports plan to transport patient home via personal car. This  contacted [address] [place] [address] with new referral.  [facility] to follow patient upon DC. DAVIS completed.     Electronically signed by JAMES Keita on 3/16/2022 at 12:28 PM

## 2022-03-16 NOTE — DISCHARGE SUMMARY
Discharge summary  This patient Taylor Denton was admitted to the hospital on 3/15/2022  after undergoing Procedure(s) (LRB):  LEFT TOTAL KNEE ARTHROPLASTY. SUPINE, MU PSI (Left) without complications that morning. During the postoperative period,while in hospital, patient was medically managed by the hospitalist. Please see medial notes and H&P for patients additional diagnoses. Ortho agrees with all medical diagnoses and treatments while patient in hospital.    No significant or unexpected findings noted during hospital stay. ..... Hospital course  Patient tolerated surgical procedure well and there was no complications. Patient progressed adequtly through their recovery during hospital stay including PT and rehabilitation. DVT prophylaxis was implemented POD#1  Patient was then D/C on  to Home  in stable condition. Patient was instructed on the use of pain medications, the signs and symptoms of infection, and was given our number to call should they have any questions or concerns following discharge.

## 2022-06-06 PROBLEM — R80.9 MICROALBUMINURIA: Status: ACTIVE | Noted: 2022-06-06

## 2022-06-06 PROBLEM — J61 ASBESTOSIS (HCC): Status: ACTIVE | Noted: 2022-06-06

## 2022-06-06 PROBLEM — E89.0 POSTSURGICAL HYPOTHYROIDISM: Status: ACTIVE | Noted: 2022-06-06

## 2022-06-06 PROBLEM — H90.3 SENSORINEURAL HEARING LOSS, BILATERAL: Status: ACTIVE | Noted: 2022-06-06

## 2022-06-06 PROBLEM — R41.3 SHORT-TERM MEMORY LOSS: Status: ACTIVE | Noted: 2022-06-06

## 2022-06-06 PROBLEM — E11.21 DIABETIC RENAL DISEASE (HCC): Status: ACTIVE | Noted: 2022-06-06

## 2022-06-06 PROBLEM — Q61.9 CONGENITAL CYSTIC KIDNEY DISEASE: Status: ACTIVE | Noted: 2022-06-06

## 2022-06-06 PROBLEM — E78.6 LIPOPROTEIN DEFICIENCY DISORDER: Status: ACTIVE | Noted: 2022-06-06

## 2022-06-06 PROBLEM — I10 BENIGN ESSENTIAL HYPERTENSION: Status: ACTIVE | Noted: 2022-06-06

## 2022-06-06 PROBLEM — M10.9 GOUTY ARTHROPATHY: Status: ACTIVE | Noted: 2022-06-06

## 2022-06-06 PROBLEM — J45.20 MILD INTERMITTENT ASTHMA: Status: ACTIVE | Noted: 2022-06-06

## 2022-06-06 PROBLEM — G47.50 PARASOMNIA: Status: ACTIVE | Noted: 2021-04-26

## 2022-06-06 PROBLEM — M17.9 OSTEOARTHRITIS OF KNEE: Status: ACTIVE | Noted: 2021-12-10

## 2022-06-06 PROBLEM — E29.1 TESTICULAR HYPOFUNCTION: Status: ACTIVE | Noted: 2022-06-06

## 2022-06-07 PROBLEM — G47.52 REM BEHAVIORAL DISORDER: Status: ACTIVE | Noted: 2022-06-07

## 2022-06-13 ENCOUNTER — OFFICE VISIT (OUTPATIENT)
Dept: NEUROLOGY | Age: 72
End: 2022-06-13
Payer: MEDICARE

## 2022-06-13 VITALS
HEART RATE: 77 BPM | HEIGHT: 77 IN | DIASTOLIC BLOOD PRESSURE: 70 MMHG | WEIGHT: 235 LBS | SYSTOLIC BLOOD PRESSURE: 120 MMHG | BODY MASS INDEX: 27.75 KG/M2

## 2022-06-13 DIAGNOSIS — R41.3 SHORT-TERM MEMORY LOSS: ICD-10-CM

## 2022-06-13 DIAGNOSIS — R40.4 TRANSIENT ALTERATION OF AWARENESS: ICD-10-CM

## 2022-06-13 DIAGNOSIS — F09 MILD COGNITIVE DISORDER: ICD-10-CM

## 2022-06-13 DIAGNOSIS — A86 VIRAL ENCEPHALITIS: Primary | ICD-10-CM

## 2022-06-13 DIAGNOSIS — G47.33 OBSTRUCTIVE SLEEP APNEA: ICD-10-CM

## 2022-06-13 DIAGNOSIS — G47.52 REM BEHAVIORAL DISORDER: ICD-10-CM

## 2022-06-13 PROCEDURE — 1123F ACP DISCUSS/DSCN MKR DOCD: CPT | Performed by: PSYCHIATRY & NEUROLOGY

## 2022-06-13 PROCEDURE — 99214 OFFICE O/P EST MOD 30 MIN: CPT | Performed by: PSYCHIATRY & NEUROLOGY

## 2022-06-13 ASSESSMENT — ENCOUNTER SYMPTOMS
TROUBLE SWALLOWING: 0
CHOKING: 0
BACK PAIN: 0
PHOTOPHOBIA: 0
SHORTNESS OF BREATH: 0
VOMITING: 0
NAUSEA: 0
COLOR CHANGE: 0

## 2022-06-13 NOTE — PROGRESS NOTES
Subjective:      Patient ID: Alex Johnson is a 80 y.o. male who presents today for:  Chief Complaint   Patient presents with    6 Month Follow-Up     Viral Encephalitis       HPI 76-year-old right-handed gentleman with a history of  Oral encephalitis. Patient had a lumbar puncture with 23 white cells. Treated with acyclovir. He then developed some REM sleep disorder on Klonopin. Patient also is on Aricept for cognitive decline. RI did not show any sequelae. We continued him on a higher dose of Klonopin at 2 mg and gabapentin and we discontinued the gabapentin as we thought this was adding to his memory issues as well. This was not helping him in any way. He still has a mild headache on and off. This is not causing any issues and he requires some supervision for driving but does not get lost.  Truly has mild cognitive impairment. He has history of obstructive sleep apnea    Patient is still quite concerned about his memory which we continue to discuss. He has not any seizures and therefore we had not started anticonvulsants as he did not have any scarring from the infection either  Past Medical History:   Diagnosis Date    COPD [facility]chronic obstructive [facility] disease) [facility] [address] [facility]     Diabetes mellitus ([facility] [address]     Hyperlipidemia     Hypertension     Lung disease     Meningitis spinal      Past Surgical History:   Procedure Laterality Date    COLONOSCOPY      HAND SURGERY Right     [place] COLON CA SCRN NOT HI RSK IND N/A 8/15/2018    COLONOSCOPY performed by Sofia Arechiga MD at [facility] 5/21/2019    SIGMOIDOSCOPY W/ DILATION performed by Sofia Arechiga MD at [address] [place] [address] 3/15/2022    LEFT TOTAL KNEE ARTHROPLASTY.  Winifred Jason PSI performed by John Nixon MD at [address] Marital status:      Spouse name: Not on file    Number of children: Not on file    Years of education: Not on file    Highest education level: Not on file   Occupational History    Not on file   Tobacco Use    Smoking status: Former Smoker     Years: 40.00     Types: Cigarettes     Start date: 1968     Quit date: 2000     Years since quittin.0    Smokeless tobacco: Never Used   Vaping Use    Vaping Use: Never used   Substance and Sexual Activity    Alcohol use: No     Alcohol/week: 0.0 standard drinks    Drug use: No    Sexual activity: Not on file   Other Topics Concern    Not on file   Social History Narrative    Not on file     Social Determinants of Health     Financial Resource Strain:     Difficulty of Paying Living Expenses: Not on file   Food Insecurity:     Worried About Running Out of Food in the Last Year: Not on file    Fercho of Food in the Last Year: Not on file   Transportation Needs:     Lack of Transportation (Medical): Not on file    Lack of Transportation (Non-Medical):  Not on file   Physical Activity:     Days of Exercise per Week: Not on file    Minutes of Exercise per Session: Not on file   Stress:     Feeling of Stress : Not on file   Social Connections:     Frequency of Communication with Friends and Family: Not on file    Frequency of Social Gatherings with Friends and Family: Not on file    Attends Druze Services: Not on file    Active Member of [address] [place] [address] [employer] or Organizations: Not on file    Attends Club or Organization Meetings: Not on file    Marital Status: Not on file   Intimate Partner Violence:     Fear of Current or Ex-Partner: Not on file    Emotionally Abused: Not on file    Physically Abused: Not on file    Sexually Abused: Not on file   Housing Stability:     Unable to Pay for Housing in the Last Year: Not on file    Number of [place] in the Last Year: Not on file    Unstable Housing in the Last Year: Not on file     Family History   Problem Relation Age of Onset    Coronary Art Dis Mother     Coronary Art Dis Sister    Clare George Colon Cancer Brother     Coronary Art Dis Brother      Allergies   Allergen Reactions    Pravastatin Other (See Comments)    Simvastatin Other (See Comments)       Current Outpatient Medications   Medication Sig Dispense Refill    aspirin 81 MG EC tablet Take 1 tablet by mouth 2 times daily 30 tablet 0    metFORMIN (GLUCOPHAGE-XR) 500 MG extended release tablet       metoprolol succinate (TOPROL XL) 25 MG extended release tablet       donepezil (ARICEPT) 10 MG tablet       ammonium lactate (AMLACTIN) 12 % cream Apply topically as needed for Dry Skin Apply topically as needed.  budesonide-formoterol (SYMBICORT) 160-4.5 MCG/ACT AERO Inhale 2 puffs into the lungs 2 times daily      Respiratory Therapy Supplies SOURAV Change CPAP pressure to 6 cm   New CPAP mask and supplies 1 Device 0    levalbuterol (XOPENEX HFA) 45 MCG/ACT inhaler Inhale 2 puffs into the lungs every 4 hours as needed for Wheezing      brimonidine (ALPHAGAN) 0.2 % ophthalmic solution Place 1 drop into both eyes 2 times daily       lovastatin (MEVACOR) 40 MG tablet Take 40 mg by mouth daily       levothyroxine (SYNTHROID) 100 MCG tablet Take 100 mcg by mouth Daily       clonazePAM (KLONOPIN) 2 MG tablet Take 2 mg by mouth nightly.  allopurinol (ZYLOPRIM) 300 MG tablet        No current facility-administered medications for this visit. Review of Systems   Constitutional: Negative for fever. HENT: Negative for ear pain, tinnitus and trouble swallowing. Eyes: Negative for photophobia and visual disturbance. Respiratory: Negative for choking and shortness of breath. Cardiovascular: Negative for chest pain and palpitations. Gastrointestinal: Negative for nausea and vomiting. Musculoskeletal: Negative for back pain, gait problem, joint swelling, myalgias, neck pain and neck stiffness. Skin: Negative for color change. Allergic/Immunologic: Negative for food allergies.    Neurological: Negative for dizziness, tremors, seizures, syncope, facial asymmetry, speech difficulty, weakness, light-headedness, numbness and headaches. Psychiatric/Behavioral: Negative for behavioral problems, confusion, hallucinations and sleep disturbance. Objective:   /70   Pulse 77   Ht 6' 5\" (1.956 m)   Wt 235 lb (106.6 kg)   BMI 27.87 kg/m²     Physical Exam  Vitals reviewed. Eyes:      Pupils: Pupils are equal, round, and reactive to light. Cardiovascular:      Rate and Rhythm: Normal rate and regular rhythm. Heart sounds: No murmur heard. Pulmonary:      Effort: Pulmonary effort is normal.      Breath sounds: Normal breath sounds. Abdominal:      General: Bowel sounds are normal.   Musculoskeletal:         General: Normal range of motion. Cervical back: Normal range of motion. Skin:     General: Skin is warm. Neurological:      Mental Status: He is alert and oriented to person, place, and time. Cranial Nerves: No cranial nerve deficit. Sensory: No sensory deficit. Motor: No abnormal muscle tone. Coordination: Coordination normal.      Deep Tendon Reflexes: Reflexes are normal and symmetric. Babinski sign absent on the right side. Babinski sign absent on the left side. Psychiatric:         Mood and Affect: Mood normal.         No results found.     Lab Results   Component Value Date    WBC 11.0 03/16/2022    RBC 4.61 03/16/2022    HGB 13.1 03/16/2022    HCT 39.2 03/16/2022    MCV 85.0 03/16/2022    MCH 28.4 03/16/2022    MCHC 33.4 03/16/2022    RDW 15.9 03/16/2022     03/16/2022     Lab Results   Component Value Date     03/16/2022    K 4.7 03/16/2022     03/16/2022    CO2 23 03/16/2022    BUN 24 03/16/2022    CREATININE 1.55 03/16/2022    GFRAA 53.8 03/16/2022    LABGLOM 44.5 03/16/2022    GLUCOSE 176 03/16/2022    GLUCOSE 79 05/09/2012    PROT 7.8 03/08/2022    LABALBU 4.2 03/08/2022    CALCIUM 8.9 03/16/2022    BILITOT 0.3 03/08/2022    ALKPHOS 76 03/08/2022 AST 20 03/08/2022    ALT 14 03/08/2022     Lab Results   Component Value Date    PROTIME 13.7 03/08/2022    INR 1.1 03/08/2022     Lab Results   Component Value Date    TSH 3.040 02/22/2022    [id number] 454 09/25/2021    FOLATE 18.2 09/25/2021     Lab Results   Component Value Date    TRIG 53 12/06/2021    HDL 50 12/06/2021    LDLCALC 76 12/06/2021     Lab Results   Component Value Date    LABAMPH Neg 03/04/2021    BARBSCNU Neg 03/04/2021    LABBENZ Neg 03/04/2021    OPIATESCREENURINE POSITIVE 03/04/2021    PHENCYCLIDINESCREENURINE Neg 03/04/2021     No results found for: LITHIUM, DILFRTOT, VALPROATE    Assessment:       Diagnosis Orders   1. Viral encephalitis     2. Obstructive sleep apnea     3. Transient alteration of awareness     4. Mild cognitive disorder     5. REM behavioral disorder     6. Short-term memory loss     Viral encephalitis with elevated white cell count in the CSF. Patient was treated with acyclovir with good outcome. He did not sustain any scarring and there was no limbic encephalitis. This though did leave him with some memory issues which he still has and he continues with the same. Is not much we can add for the same but is functional.    His main issues appears to be REM sleep disorders and is fallen out of bed couple of times and we therefore have continued him on Klonopin 2 mg at night which is helping. Patient also has obstructive sleep apnea uses the machine which can add to memory issues as well    She had some question regarding memory and I truly feel that this is going to be static from here on again in the future of course there is some higher incidence of memory decline in this patient's which we will continue to follow      Plan:      No orders of the defined types were placed in this encounter. No orders of the defined types were placed in this encounter. No follow-ups on file.       Jani Gonzalez MD

## 2022-11-08 ENCOUNTER — HOSPITAL ENCOUNTER (OUTPATIENT)
Dept: LAB | Age: 72
Discharge: HOME | End: 2022-11-08
Payer: MEDICARE

## 2022-11-08 LAB
ALBUMIN SERPL-MCNC: 4 G/DL (ref 3.5–4.6)
ALP BLD-CCNC: 87 U/L (ref 35–104)
ALT SERPL-CCNC: 16 U/L (ref 0–41)
ANION GAP SERPL CALCULATED.3IONS-SCNC: 10 MEQ/L (ref 9–15)
AST SERPL-CCNC: 17 U/L (ref 0–40)
BILIRUB SERPL-MCNC: 0.4 MG/DL (ref 0.2–0.7)
BUN BLDV-MCNC: 17 MG/DL (ref 8–23)
CALCIUM SERPL-MCNC: 8.9 MG/DL (ref 8.5–9.9)
CHLORIDE BLD-SCNC: 106 MEQ/L (ref 95–107)
CHOLESTEROL, TOTAL: 147 MG/DL (ref 0–199)
CO2: 26 MEQ/L (ref 20–31)
CREAT SERPL-MCNC: 1.52 MG/DL (ref 0.7–1.2)
GFR SERPL CREATININE-BSD FRML MDRD: 48.6 ML/MIN/{1.73_M2}
GLOBULIN: 2.8 G/DL (ref 2.3–3.5)
GLUCOSE BLD-MCNC: 132 MG/DL (ref 70–99)
HDLC SERPL-MCNC: 47 MG/DL (ref 40–59)
LDL CHOLESTEROL CALCULATED: 85 MG/DL (ref 0–129)
POTASSIUM SERPL-SCNC: 4 MEQ/L (ref 3.4–4.9)
SODIUM BLD-SCNC: 142 MEQ/L (ref 135–144)
TOTAL PROTEIN: 6.8 G/DL (ref 6.3–8)
TRIGL SERPL-MCNC: 75 MG/DL (ref 0–150)
TSH SERPL DL<=0.05 MIU/L-ACNC: 4.37 UIU/ML (ref 0.44–3.86)

## 2022-11-08 PROCEDURE — 84443 ASSAY THYROID STIM HORMONE: CPT

## 2022-11-08 PROCEDURE — 80053 COMPREHEN METABOLIC PANEL: CPT

## 2022-11-08 PROCEDURE — 80061 LIPID PANEL: CPT

## 2022-11-08 PROCEDURE — 36415 COLL VENOUS BLD VENIPUNCTURE: CPT

## 2022-12-06 PROBLEM — M17.0 BILATERAL PRIMARY OSTEOARTHRITIS OF KNEE: Status: ACTIVE | Noted: 2022-03-07

## 2022-12-06 PROBLEM — S80.222A BLISTER (NONTHERMAL), LEFT KNEE, INITIAL ENCOUNTER: Status: ACTIVE | Noted: 2022-06-20

## 2022-12-06 PROBLEM — Z96.652 PRESENCE OF LEFT ARTIFICIAL KNEE JOINT: Status: ACTIVE | Noted: 2022-06-20

## 2022-12-06 PROBLEM — M25.561 PAIN IN RIGHT KNEE: Status: ACTIVE | Noted: 2021-12-10

## 2022-12-08 PROBLEM — R40.4 TRANSIENT ALTERATION OF AWARENESS: Status: ACTIVE | Noted: 2022-12-08

## 2022-12-12 ENCOUNTER — OFFICE VISIT (OUTPATIENT)
Dept: NEUROLOGY | Age: 72
End: 2022-12-12
Payer: MEDICARE

## 2022-12-12 VITALS
SYSTOLIC BLOOD PRESSURE: 122 MMHG | DIASTOLIC BLOOD PRESSURE: 80 MMHG | BODY MASS INDEX: 26.74 KG/M2 | HEART RATE: 80 BPM | WEIGHT: 225.5 LBS

## 2022-12-12 DIAGNOSIS — G93.40 ENCEPHALOPATHY: ICD-10-CM

## 2022-12-12 DIAGNOSIS — G47.52 REM SLEEP BEHAVIOR DISORDER: ICD-10-CM

## 2022-12-12 DIAGNOSIS — F09 MILD COGNITIVE DISORDER: ICD-10-CM

## 2022-12-12 DIAGNOSIS — A86 VIRAL ENCEPHALITIS: Primary | ICD-10-CM

## 2022-12-12 PROCEDURE — 1123F ACP DISCUSS/DSCN MKR DOCD: CPT | Performed by: PSYCHIATRY & NEUROLOGY

## 2022-12-12 PROCEDURE — 3074F SYST BP LT 130 MM HG: CPT | Performed by: PSYCHIATRY & NEUROLOGY

## 2022-12-12 PROCEDURE — 99214 OFFICE O/P EST MOD 30 MIN: CPT | Performed by: PSYCHIATRY & NEUROLOGY

## 2022-12-12 PROCEDURE — 3078F DIAST BP <80 MM HG: CPT | Performed by: PSYCHIATRY & NEUROLOGY

## 2022-12-12 RX ORDER — MELOXICAM 15 MG/1
TABLET ORAL
COMMUNITY
Start: 2022-10-03

## 2022-12-12 RX ORDER — MEMANTINE HYDROCHLORIDE 5 MG/1
5 TABLET ORAL 2 TIMES DAILY
Qty: 60 TABLET | Refills: 0 | Status: SHIPPED | OUTPATIENT
Start: 2022-12-12

## 2022-12-12 RX ORDER — LISINOPRIL 2.5 MG/1
TABLET ORAL
COMMUNITY
Start: 2022-11-14

## 2022-12-12 ASSESSMENT — ENCOUNTER SYMPTOMS
VOMITING: 0
CHOKING: 0
COLOR CHANGE: 0
TROUBLE SWALLOWING: 0
SHORTNESS OF BREATH: 0
BACK PAIN: 0
NAUSEA: 0
PHOTOPHOBIA: 0

## 2022-12-12 NOTE — PROGRESS NOTES
Subjective:      Patient ID: Arnulfo Burdick is a 71 y.o. male who presents today for:  Chief Complaint   Patient presents with    6 Month Follow-Up     Pt wife states his memory is still off, he is still forgetting things she feels as its gotten worse since his last visit. Pt states some dizziness he has noticed but believes its coming from his new medication. HPI 58-year-old right-handed female with a history of viral encephalitis. Patient had a lumbar puncture with 23 white cells and was treated. He does have cognitive impairment which is residual of his viral encephalitis. He appears to be slowly worsening though his Mini-Mental examination score has not changed much and continues to be in the range of about 21. He has difficulty with reading and writing and therefore this is also contaminated score. He has cognitive impairment. He has history of obstructive sleep apnea as well. Patient has not any agitations. He has not had any seizures and therefore we had not further recommended any anticonvulsants. Patient is on Aricept 10 mg. Is still having some memory issues and some occasional agitation    Past Medical History:   Diagnosis Date    COPD (chronic obstructive pulmonary disease) ([facility] [address]     Diabetes mellitus ([facility] [address]     Hyperlipidemia     Hypertension     Lung disease     Meningitis spinal      Past Surgical History:   Procedure Laterality Date    COLONOSCOPY      HAND SURGERY Right     [place] COLON CA SCRN NOT HI RSK IND N/A 8/15/2018    COLONOSCOPY performed by Britt Patino MD at [address] N/A 5/21/2019    SIGMOIDOSCOPY W/ DILATION performed by Britt Patino MD at [address] Left 3/15/2022    LEFT TOTAL KNEE ARTHROPLASTY.  [facility] PSI performed by Darek Marie MD at [address] [address] History    Marital status:      Spouse name: Not on file    Number of children: Not on file Years of education: Not on file    Highest education level: Not on file   Occupational History    Not on file   Tobacco Use    Smoking status: Former     Years: 40.00     Types: Cigarettes     Start date: 1968     Quit date: 2000     Years since quittin.5    Smokeless tobacco: Never   Vaping Use    Vaping Use: Never used   Substance and Sexual Activity    Alcohol use: No     Alcohol/week: 0.0 standard drinks    Drug use: No    Sexual activity: Not on file   Other Topics Concern    Not on file   Social History Narrative    Not on file     Social Determinants of Health     Financial Resource Strain: Not on file   Food Insecurity: Not on file   Transportation Needs: Not on file   Physical Activity: Not on file   Stress: Not on file   Social Connections: Not on file   Intimate Partner Violence: Not on file   Housing Stability: Not on file     Family History   Problem Relation Age of Onset    Coronary Art Dis Mother     Coronary Art Dis Sister     Colon Cancer Brother     Coronary Art Dis Brother      Allergies   Allergen Reactions    Pravastatin Other (See Comments)    Simvastatin Other (See Comments)       Current Outpatient Medications   Medication Sig Dispense Refill    lisinopril (PRINIVIL;ZESTRIL) 2.5 MG tablet TAKE 1 TABLET BY MOUTH ONCE DAILY FOR 90 DAYS      meloxicam (MOBIC) 15 MG tablet TAKE 1 TABLET BY MOUTH ONCE DAILY      aspirin 81 MG EC tablet Take 1 tablet by mouth 2 times daily 30 tablet 0    metFORMIN (GLUCOPHAGE-XR) 500 MG extended release tablet       metoprolol succinate (TOPROL XL) 25 MG extended release tablet       donepezil (ARICEPT) 10 MG tablet       budesonide-formoterol (SYMBICORT) 160-4.5 MCG/ACT AERO Inhale 2 puffs into the lungs 2 times daily      Respiratory Therapy Supplies SOURVA Change CPAP pressure to 6 cm   New CPAP mask and supplies 1 Device 0    levalbuterol (XOPENEX HFA) 45 MCG/ACT inhaler Inhale 2 puffs into the lungs every 4 hours as needed for Wheezing brimonidine (ALPHAGAN) 0.2 % ophthalmic solution Place 1 drop into both eyes 2 times daily       lovastatin (MEVACOR) 40 MG tablet Take 40 mg by mouth daily       levothyroxine (SYNTHROID) 100 MCG tablet Take 100 mcg by mouth Daily       clonazePAM (KLONOPIN) 2 MG tablet Take 2 mg by mouth nightly. ammonium lactate (AMLACTIN) 12 % cream Apply topically as needed for Dry Skin Apply topically as needed. (Patient not taking: Reported on 12/12/2022)      allopurinol (ZYLOPRIM) 300 MG tablet  (Patient not taking: Reported on 12/12/2022)       No current facility-administered medications for this visit. Review of Systems   Constitutional:  Negative for fever. HENT:  Negative for ear pain, tinnitus and trouble swallowing. Eyes:  Negative for photophobia and visual disturbance. Respiratory:  Negative for choking and shortness of breath. Cardiovascular:  Negative for chest pain and palpitations. Gastrointestinal:  Negative for nausea and vomiting. Musculoskeletal:  Negative for back pain, gait problem, joint swelling, myalgias, neck pain and neck stiffness. Skin:  Negative for color change. Allergic/Immunologic: Negative for food allergies. Neurological:  Negative for dizziness, tremors, seizures, syncope, facial asymmetry, speech difficulty, weakness, light-headedness, numbness and headaches. Psychiatric/Behavioral:  Negative for behavioral problems, confusion, hallucinations and sleep disturbance. Objective:   /80 (Site: Right Upper Arm, Position: Sitting, Cuff Size: Medium Adult)   Pulse 80   Wt 225 lb 8 oz (102.3 kg)   BMI 26.74 kg/m²     Physical Exam  Vitals reviewed. Eyes:      Pupils: Pupils are equal, round, and reactive to light. Cardiovascular:      Rate and Rhythm: Normal rate and regular rhythm. Heart sounds: No murmur heard. Pulmonary:      Effort: Pulmonary effort is normal.      Breath sounds: Normal breath sounds.    Abdominal:      General: Bowel sounds are normal.   Musculoskeletal:         General: Normal range of motion. Cervical back: Normal range of motion. Skin:     General: Skin is warm. Neurological:      Mental Status: He is alert and oriented to person, place, and time. Cranial Nerves: No cranial nerve deficit. Sensory: No sensory deficit. Motor: No abnormal muscle tone. Coordination: Coordination normal.      Deep Tendon Reflexes: Reflexes are normal and symmetric. Babinski sign absent on the right side. Babinski sign absent on the left side. Psychiatric:         Mood and Affect: Mood normal.       No results found.     Lab Results   Component Value Date/Time    WBC 11.0 03/16/2022 05:48 AM    RBC 4.61 03/16/2022 05:48 AM    HGB 13.1 03/16/2022 05:48 AM    HCT 39.2 03/16/2022 05:48 AM    MCV 85.0 03/16/2022 05:48 AM    MCH 28.4 03/16/2022 05:48 AM    MCHC 33.4 03/16/2022 05:48 AM    RDW 15.9 03/16/2022 05:48 AM     03/16/2022 05:48 AM     Lab Results   Component Value Date/Time     11/08/2022 09:49 AM    K 4.0 11/08/2022 09:49 AM    K 4.7 03/16/2022 05:48 AM     11/08/2022 09:49 AM    CO2 26 11/08/2022 09:49 AM    BUN 17 11/08/2022 09:49 AM    CREATININE 1.52 11/08/2022 09:49 AM    GFRAA 53.8 03/16/2022 05:48 AM    LABGLOM 48.6 11/08/2022 09:49 AM    GLUCOSE 132 11/08/2022 09:49 AM    GLUCOSE 79 05/09/2012 08:24 AM    PROT 6.8 11/08/2022 09:49 AM    LABALBU 4.0 11/08/2022 09:49 AM    CALCIUM 8.9 11/08/2022 09:49 AM    BILITOT 0.4 11/08/2022 09:49 AM    ALKPHOS 87 11/08/2022 09:49 AM    AST 17 11/08/2022 09:49 AM    ALT 16 11/08/2022 09:49 AM     Lab Results   Component Value Date/Time    PROTIME 13.7 03/08/2022 01:35 PM    INR 1.1 03/08/2022 01:35 PM     Lab Results   Component Value Date/Time    TSH 4.370 11/08/2022 09:49 AM    [id number] 454 09/25/2021 10:00 AM    FOLATE 18.2 09/25/2021 10:00 AM     Lab Results   Component Value Date/Time    TRIG 75 11/08/2022 09:49 AM    HDL 47 11/08/2022 09:49 AM    LDLCALC 85 11/08/2022 09:49 AM     Lab Results   Component Value Date/Time    LABAMPH Neg 03/04/2021 08:31 AM    BARBSCNU Neg 03/04/2021 08:31 AM    LABBENZ Neg 03/04/2021 08:31 AM    OPIATESCREENURINE POSITIVE 03/04/2021 08:31 AM    PHENCYCLIDINESCREENURINE Neg 03/04/2021 08:31 AM     No results found for: LITHIUM, DILFRTOT, VALPROATE    Assessment:       Diagnosis Orders   1. Viral encephalitis        2. Encephalopathy        3. Mild cognitive disorder        4. REM sleep behavior disorder        Viral encephalitis with a WBC count of 27 CSF. 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 Patient is on Aricept and having some more memory issues and we therefore performed an MMSE which is 21 almost what it has been. He has some occasional agitation therefore recommended starting him on Namenda 5 mg twice a day. He has some minor headaches but otherwise functions really well. Study falls no seizures therefore we had not recommended any anticonvulsant  Effects of been discussed and there is likely that he has some parasomnias as well he uses a CPAP machine which does help occasionally this comes off given that it has a short code and recommended that he buy a longer tubing. Ollie Hartman MD, Bryce Simmons, American Board of Psychiatry & Neurology  Board Certified in Vascular Neurology  Board Certified in Neuromuscular Medicine  Certified in [facility]:      No orders of the defined types were placed in this encounter. No orders of the defined types were placed in this encounter. No follow-ups on file.       Serena Orellana MD

## 2023-01-10 RX ORDER — MEMANTINE HYDROCHLORIDE 5 MG/1
TABLET ORAL
Qty: 60 TABLET | Refills: 0 | Status: SHIPPED | OUTPATIENT
Start: 2023-01-10

## 2023-02-06 RX ORDER — MEMANTINE HYDROCHLORIDE 5 MG/1
TABLET ORAL
Qty: 60 TABLET | Refills: 0 | Status: SHIPPED | OUTPATIENT
Start: 2023-02-06

## 2023-02-06 NOTE — TELEPHONE ENCOUNTER
Pharmacy isrequesting medication refill.  Please approve or deny this request.    Rx requested:  Requested Prescriptions     Pending Prescriptions Disp Refills    memantine (NAMENDA) 5 MG tablet [Pharmacy Med Name: Memantine HCl 5 MG Oral Tablet] 60 tablet 0     Sig: Take 1 tablet by mouth twice daily         Last Office Visit:   12/12/2022      Next Visit Date:  Future Appointments   Date Time Provider Alexia Arcos   6/12/2023  3:00 PM MD Vince Dangelo Neurology -

## 2023-03-08 ENCOUNTER — HOSPITAL ENCOUNTER (OUTPATIENT)
Dept: ULTRASOUND IMAGING | Age: 73
Discharge: HOME | End: 2023-03-10
Payer: MEDICARE

## 2023-03-08 DIAGNOSIS — I73.9 PERIPHERAL VASCULAR DISEASE, UNSPECIFIED (HCC): ICD-10-CM

## 2023-03-08 PROCEDURE — 93923 UPR/LXTR ART STDY 3+ LVLS: CPT | Performed by: INTERNAL MEDICINE

## 2023-03-08 PROCEDURE — 93923 UPR/LXTR ART STDY 3+ LVLS: CPT

## 2023-03-15 ENCOUNTER — HOSPITAL ENCOUNTER (OUTPATIENT)
Dept: LAB | Age: 73
Discharge: HOME | End: 2023-03-15
Payer: MEDICARE

## 2023-03-15 LAB — TSH SERPL-MCNC: 1.13 UIU/ML (ref 0.44–3.86)

## 2023-03-15 PROCEDURE — 84443 ASSAY THYROID STIM HORMONE: CPT

## 2023-03-15 PROCEDURE — 36415 COLL VENOUS BLD VENIPUNCTURE: CPT

## 2023-04-18 RX ORDER — MEMANTINE HYDROCHLORIDE 5 MG/1
TABLET ORAL
Qty: 60 TABLET | Refills: 2 | Status: SHIPPED | OUTPATIENT
Start: 2023-04-18

## 2023-04-18 NOTE — TELEPHONE ENCOUNTER
Patient is requesting medication refill.  Please approve or deny this request.    Rx requested:  Requested Prescriptions     Pending Prescriptions Disp Refills    memantine (NAMENDA) 5 MG tablet [Pharmacy Med Name: Memantine HCl 5 MG Oral Tablet] 60 tablet 2     Sig: Take 1 tablet by mouth twice daily         Last Office Visit:   12/12/2022      Next Visit Date:  Future Appointments   Date Time Provider Alexia Arcos   6/12/2023  3:00 PM MD Hilaria Elise Neurology -

## 2023-05-19 ENCOUNTER — HOSPITAL ENCOUNTER (OUTPATIENT)
Dept: LAB | Age: 73
Discharge: HOME | End: 2023-05-19
Payer: MEDICARE

## 2023-05-19 LAB
BASOPHILS # BLD: 0.1 K/UL (ref 0–0.2)
BASOPHILS NFR BLD: 1.3 %
CRP SERPL HS-MCNC: <3 MG/L (ref 0–5)
EOSINOPHIL # BLD: 0.3 K/UL (ref 0–0.7)
EOSINOPHIL NFR BLD: 5.7 %
ERYTHROCYTE [DISTWIDTH] IN BLOOD BY AUTOMATED COUNT: 15.4 % (ref 11.5–14.5)
ERYTHROCYTE [SEDIMENTATION RATE] IN BLOOD BY WESTERGREN METHOD: 8 MM (ref 0–20)
HCT VFR BLD AUTO: 40.8 % (ref 42–52)
HGB BLD-MCNC: 13.4 G/DL (ref 14–18)
LYMPHOCYTES # BLD: 1.3 K/UL (ref 1–4.8)
LYMPHOCYTES NFR BLD: 23.6 %
MCH RBC QN AUTO: 28.5 PG (ref 27–31.3)
MCHC RBC AUTO-ENTMCNC: 32.9 % (ref 33–37)
MCV RBC AUTO: 86.6 FL (ref 79–92.2)
MONOCYTES # BLD: 0.5 K/UL (ref 0.2–0.8)
MONOCYTES NFR BLD: 9.2 %
NEUTROPHILS # BLD: 3.2 K/UL (ref 1.4–6.5)
NEUTS SEG NFR BLD: 60.2 %
PLATELET # BLD AUTO: 169 K/UL (ref 130–400)
RBC # BLD AUTO: 4.71 M/UL (ref 4.7–6.1)
WBC # BLD AUTO: 5.4 K/UL (ref 4.8–10.8)

## 2023-05-19 PROCEDURE — 36415 COLL VENOUS BLD VENIPUNCTURE: CPT

## 2023-05-19 PROCEDURE — 85652 RBC SED RATE AUTOMATED: CPT

## 2023-05-19 PROCEDURE — 85025 COMPLETE CBC W/AUTO DIFF WBC: CPT

## 2023-05-19 PROCEDURE — 86140 C-REACTIVE PROTEIN: CPT

## 2023-06-09 PROBLEM — M79.89 SWELLING OF LEFT LOWER EXTREMITY: Status: ACTIVE | Noted: 2023-06-09

## 2023-06-09 PROBLEM — K63.9 DISORDER OF INTESTINE: Status: ACTIVE | Noted: 2023-06-09

## 2023-06-09 PROBLEM — N32.9 DISORDER OF BLADDER: Status: ACTIVE | Noted: 2023-06-09

## 2023-06-22 ENCOUNTER — TELEPHONE (OUTPATIENT)
Dept: NEUROLOGY | Age: 73
End: 2023-06-22

## 2023-06-22 NOTE — TELEPHONE ENCOUNTER
Wife called and said the patient isn't really as good as he said he was. Wife wasn't allowed at the appointment. Wife said he called the  department on her, patient believes people are trying to steal his house, pt took his weapons out of the house and is telling people someone stole them but wife does not know where the weapons are. Wife stated he is having major behavior changes such as anger, and increased aggression. Wife doesn't know what should be done, or if patient is even taking his medication. Advised wife he should be taken to ER because there could be a number of the things going on. Spoke to Pippa Jessica and she agreed he needs to be brought in for sudden behavior changes and delusions.

## 2023-07-07 ENCOUNTER — HOSPITAL ENCOUNTER (OUTPATIENT)
Age: 73
Setting detail: OBSERVATION
Discharge: HOME | End: 2023-07-10
Attending: EMERGENCY MEDICINE
Payer: MEDICARE

## 2023-07-07 ENCOUNTER — APPOINTMENT (OUTPATIENT)
Dept: CT IMAGING | Age: 73
End: 2023-07-07
Payer: MEDICARE

## 2023-07-07 DIAGNOSIS — Z86.59 HISTORY OF DEMENTIA: ICD-10-CM

## 2023-07-07 DIAGNOSIS — Z60.9 PROBLEM SITUATION RELATING TO SOCIAL AND PERSONAL HISTORY: ICD-10-CM

## 2023-07-07 DIAGNOSIS — N30.00 ACUTE CYSTITIS WITHOUT HEMATURIA: Primary | ICD-10-CM

## 2023-07-07 PROBLEM — N39.0 UTI (URINARY TRACT INFECTION): Status: ACTIVE | Noted: 2023-07-07

## 2023-07-07 LAB
ALBUMIN SERPL-MCNC: 3.7 G/DL (ref 3.5–4.6)
ALP SERPL-CCNC: 86 U/L (ref 35–104)
ALT SERPL-CCNC: <5 U/L (ref 0–41)
AMPHETAMINES UR QL SCN>500 NG/ML: NORMAL
ANION GAP SERPL CALCULATED.3IONS-SCNC: 12 MEQ/L (ref 9–15)
AST SERPL-CCNC: 16 U/L (ref 0–40)
BACTERIA URNS QL MICRO: ABNORMAL /HPF
BARBITURATES UR QL SCN>200 NG/ML: NORMAL
BASOPHILS # BLD: 0.1 K/UL (ref 0–0.1)
BASOPHILS NFR BLD: 1 % (ref 0.2–1.2)
BENZODIAZ UR QL SCN: NORMAL
BILIRUB SERPL-MCNC: 0.3 MG/DL (ref 0.2–0.7)
BILIRUB UR QL STRIP: NEGATIVE
BUN SERPL-MCNC: 17 MG/DL (ref 8–23)
CALCIUM SERPL-MCNC: 9.9 MG/DL (ref 8.5–9.9)
CHLORIDE SERPL-SCNC: 104 MEQ/L (ref 95–107)
CLARITY UR: NORMAL
CO2 SERPL-SCNC: 24 MEQ/L (ref 20–31)
COCAINE UR QL SCN: NORMAL
COLOR UR: YELLOW
CREAT SERPL-MCNC: 1.85 MG/DL (ref 0.7–1.2)
DRUG SCREEN COMMENT UR-IMP: NORMAL
EKG ATRIAL RATE: 79 BPM
EKG P AXIS: 41 DEGREES
EKG P-R INTERVAL: 196 MS
EKG Q-T INTERVAL: 360 MS
EKG QRS DURATION: 112 MS
EKG QTC CALCULATION (BAZETT): 412 MS
EKG R AXIS: -53 DEGREES
EKG T AXIS: 80 DEGREES
EKG VENTRICULAR RATE: 79 BPM
EOSINOPHIL # BLD: 0.4 K/UL (ref 0–0.5)
EOSINOPHIL NFR BLD: 5 % (ref 0.8–7)
EPI CELLS #/AREA URNS HPF: ABNORMAL /HPF
ERYTHROCYTE [DISTWIDTH] IN BLOOD BY AUTOMATED COUNT: 15.1 % (ref 11.6–14.4)
ETHANOL PERCENT: NORMAL G/DL
ETHANOLAMINE SERPL-MCNC: <10 MG/DL (ref 0–0.08)
GLOBULIN SER CALC-MCNC: 3.7 G/DL (ref 2.3–3.5)
GLUCOSE BLD-MCNC: 86 MG/DL (ref 70–99)
GLUCOSE BLD-MCNC: 94 MG/DL (ref 70–99)
GLUCOSE SERPL-MCNC: 123 MG/DL (ref 70–99)
GLUCOSE UR STRIP-MCNC: NEGATIVE MG/DL
HCT VFR BLD AUTO: 40.4 % (ref 42–52)
HGB BLD-MCNC: 13.4 G/DL (ref 13.7–17.5)
HGB UR QL STRIP: NORMAL
IMM GRANULOCYTES # BLD: 0 K/UL
IMM GRANULOCYTES NFR BLD: 0.2 %
INR PPP: 0.9
KETONES UR STRIP-MCNC: NEGATIVE MG/DL
LACTATE BLDV-SCNC: 1.5 MMOL/L (ref 0.5–2.2)
LEUKOCYTE ESTERASE UR QL STRIP: NORMAL
LYMPHOCYTES # BLD: 1.3 K/UL (ref 1.3–3.6)
LYMPHOCYTES NFR BLD: 16.4 %
MAGNESIUM SERPL-MCNC: 2.1 MG/DL (ref 1.7–2.4)
MCH RBC QN AUTO: 28.2 PG (ref 25.7–32.2)
MCHC RBC AUTO-ENTMCNC: 33.2 % (ref 32.3–36.5)
MCV RBC AUTO: 85.1 FL (ref 79–92.2)
MONOCYTES # BLD: 0.5 K/UL (ref 0.3–0.8)
MONOCYTES NFR BLD: 6.3 % (ref 5.3–12.2)
NEUTROPHILS # BLD: 5.8 K/UL (ref 1.8–5.4)
NEUTS SEG NFR BLD: 71.1 % (ref 34–67.9)
NITRITE UR QL STRIP: POSITIVE
OPIATES UR QL SCN: NORMAL
PCP UR QL SCN>25 NG/ML: NORMAL
PERFORMED ON: NORMAL
PERFORMED ON: NORMAL
PH UR STRIP: 5.5 [PH] (ref 5–9)
PLATELET # BLD AUTO: 195 K/UL (ref 163–337)
POTASSIUM SERPL-SCNC: 3.8 MEQ/L (ref 3.4–4.9)
PROT SERPL-MCNC: 7.4 G/DL (ref 6.3–8)
PROT UR STRIP-MCNC: NORMAL MG/DL
PROTHROMBIN TIME: 12.9 SEC (ref 12.3–14.9)
RBC # BLD AUTO: 4.75 M/UL (ref 4.63–6.08)
RBC #/AREA URNS HPF: ABNORMAL /HPF (ref 0–2)
SODIUM SERPL-SCNC: 140 MEQ/L (ref 135–144)
SP GR UR STRIP: 1.01 (ref 1–1.03)
THC UR QL SCN>50 NG/ML: NORMAL
TRICYCLICS UR QL SCN: NORMAL
TROPONIN T SERPL-MCNC: <0.01 NG/ML (ref 0–0.01)
TSH SERPL-MCNC: 1.22 UIU/ML (ref 0.44–3.86)
URINE REFLEX TO CULTURE: YES
UROBILINOGEN UR STRIP-ACNC: 0.2 E.U./DL
WBC # BLD AUTO: 8.1 K/UL (ref 4.2–9)
WBC #/AREA URNS HPF: ABNORMAL /HPF (ref 0–5)

## 2023-07-07 PROCEDURE — 6370000000 HC RX 637 (ALT 250 FOR IP): Performed by: INTERNAL MEDICINE

## 2023-07-07 PROCEDURE — 93005 ELECTROCARDIOGRAM TRACING: CPT

## 2023-07-07 PROCEDURE — 36415 COLL VENOUS BLD VENIPUNCTURE: CPT

## 2023-07-07 PROCEDURE — 80306 DRUG TEST PRSMV INSTRMNT: CPT

## 2023-07-07 PROCEDURE — 83605 ASSAY OF LACTIC ACID: CPT

## 2023-07-07 PROCEDURE — 84443 ASSAY THYROID STIM HORMONE: CPT

## 2023-07-07 PROCEDURE — 99285 EMERGENCY DEPT VISIT HI MDM: CPT

## 2023-07-07 PROCEDURE — 83735 ASSAY OF MAGNESIUM: CPT

## 2023-07-07 PROCEDURE — 87077 CULTURE AEROBIC IDENTIFY: CPT

## 2023-07-07 PROCEDURE — 87186 SC STD MICRODIL/AGAR DIL: CPT

## 2023-07-07 PROCEDURE — G0378 HOSPITAL OBSERVATION PER HR: HCPCS

## 2023-07-07 PROCEDURE — 96365 THER/PROPH/DIAG IV INF INIT: CPT

## 2023-07-07 PROCEDURE — 2580000003 HC RX 258: Performed by: EMERGENCY MEDICINE

## 2023-07-07 PROCEDURE — 6360000002 HC RX W HCPCS: Performed by: EMERGENCY MEDICINE

## 2023-07-07 PROCEDURE — 2580000003 HC RX 258: Performed by: INTERNAL MEDICINE

## 2023-07-07 PROCEDURE — 85610 PROTHROMBIN TIME: CPT

## 2023-07-07 PROCEDURE — 87086 URINE CULTURE/COLONY COUNT: CPT

## 2023-07-07 PROCEDURE — 85025 COMPLETE CBC W/AUTO DIFF WBC: CPT

## 2023-07-07 PROCEDURE — 81001 URINALYSIS AUTO W/SCOPE: CPT

## 2023-07-07 PROCEDURE — 93010 ELECTROCARDIOGRAM REPORT: CPT | Performed by: INTERNAL MEDICINE

## 2023-07-07 PROCEDURE — 82077 ASSAY SPEC XCP UR&BREATH IA: CPT

## 2023-07-07 PROCEDURE — 84484 ASSAY OF TROPONIN QUANT: CPT

## 2023-07-07 PROCEDURE — 70450 CT HEAD/BRAIN W/O DYE: CPT

## 2023-07-07 PROCEDURE — 80053 COMPREHEN METABOLIC PANEL: CPT

## 2023-07-07 PROCEDURE — 96361 HYDRATE IV INFUSION ADD-ON: CPT

## 2023-07-07 RX ORDER — SODIUM CHLORIDE 9 MG/ML
INJECTION, SOLUTION INTRAVENOUS CONTINUOUS
Status: DISCONTINUED | OUTPATIENT
Start: 2023-07-07 | End: 2023-07-07

## 2023-07-07 RX ORDER — DEXTROSE MONOHYDRATE 100 MG/ML
INJECTION, SOLUTION INTRAVENOUS CONTINUOUS PRN
Status: DISCONTINUED | OUTPATIENT
Start: 2023-07-07 | End: 2023-07-10 | Stop reason: HOSPADM

## 2023-07-07 RX ORDER — POLYETHYLENE GLYCOL 3350 17 G/17G
17 POWDER, FOR SOLUTION ORAL DAILY PRN
Status: DISCONTINUED | OUTPATIENT
Start: 2023-07-07 | End: 2023-07-10 | Stop reason: HOSPADM

## 2023-07-07 RX ORDER — MEMANTINE HYDROCHLORIDE 5 MG/1
5 TABLET ORAL 2 TIMES DAILY
Status: DISCONTINUED | OUTPATIENT
Start: 2023-07-07 | End: 2023-07-08

## 2023-07-07 RX ORDER — ALBUTEROL SULFATE 2.5 MG/3ML
2.5 SOLUTION RESPIRATORY (INHALATION) EVERY 4 HOURS PRN
Status: DISCONTINUED | OUTPATIENT
Start: 2023-07-07 | End: 2023-07-10 | Stop reason: HOSPADM

## 2023-07-07 RX ORDER — SODIUM CHLORIDE 0.9 % (FLUSH) 0.9 %
10 SYRINGE (ML) INJECTION PRN
Status: DISCONTINUED | OUTPATIENT
Start: 2023-07-07 | End: 2023-07-10 | Stop reason: HOSPADM

## 2023-07-07 RX ORDER — ONDANSETRON 2 MG/ML
4 INJECTION INTRAMUSCULAR; INTRAVENOUS EVERY 6 HOURS PRN
Status: DISCONTINUED | OUTPATIENT
Start: 2023-07-07 | End: 2023-07-10 | Stop reason: HOSPADM

## 2023-07-07 RX ORDER — LEVOTHYROXINE SODIUM 0.1 MG/1
100 TABLET ORAL DAILY
Status: DISCONTINUED | OUTPATIENT
Start: 2023-07-07 | End: 2023-07-10 | Stop reason: HOSPADM

## 2023-07-07 RX ORDER — PROMETHAZINE HYDROCHLORIDE 12.5 MG/1
12.5 TABLET ORAL EVERY 6 HOURS PRN
Status: DISCONTINUED | OUTPATIENT
Start: 2023-07-07 | End: 2023-07-10 | Stop reason: HOSPADM

## 2023-07-07 RX ORDER — CLONAZEPAM 0.5 MG/1
1 TABLET ORAL NIGHTLY
Status: DISCONTINUED | OUTPATIENT
Start: 2023-07-07 | End: 2023-07-10 | Stop reason: HOSPADM

## 2023-07-07 RX ORDER — ACETAMINOPHEN 650 MG/1
650 SUPPOSITORY RECTAL EVERY 6 HOURS PRN
Status: DISCONTINUED | OUTPATIENT
Start: 2023-07-07 | End: 2023-07-10 | Stop reason: HOSPADM

## 2023-07-07 RX ORDER — ENOXAPARIN SODIUM 100 MG/ML
40 INJECTION SUBCUTANEOUS DAILY
Status: DISCONTINUED | OUTPATIENT
Start: 2023-07-08 | End: 2023-07-10 | Stop reason: HOSPADM

## 2023-07-07 RX ORDER — ACETAMINOPHEN 325 MG/1
650 TABLET ORAL EVERY 6 HOURS PRN
Status: DISCONTINUED | OUTPATIENT
Start: 2023-07-07 | End: 2023-07-10 | Stop reason: HOSPADM

## 2023-07-07 RX ORDER — SODIUM CHLORIDE 9 MG/ML
INJECTION, SOLUTION INTRAVENOUS PRN
Status: DISCONTINUED | OUTPATIENT
Start: 2023-07-07 | End: 2023-07-10 | Stop reason: HOSPADM

## 2023-07-07 RX ORDER — LEVALBUTEROL TARTRATE 45 UG/1
2 AEROSOL, METERED ORAL EVERY 4 HOURS PRN
Status: DISCONTINUED | OUTPATIENT
Start: 2023-07-07 | End: 2023-07-07 | Stop reason: CLARIF

## 2023-07-07 RX ORDER — SODIUM CHLORIDE 0.9 % (FLUSH) 0.9 %
10 SYRINGE (ML) INJECTION EVERY 12 HOURS SCHEDULED
Status: DISCONTINUED | OUTPATIENT
Start: 2023-07-07 | End: 2023-07-10 | Stop reason: HOSPADM

## 2023-07-07 RX ORDER — INSULIN LISPRO 100 [IU]/ML
0-4 INJECTION, SOLUTION INTRAVENOUS; SUBCUTANEOUS NIGHTLY
Status: DISCONTINUED | OUTPATIENT
Start: 2023-07-07 | End: 2023-07-10 | Stop reason: HOSPADM

## 2023-07-07 RX ORDER — ASPIRIN 81 MG/1
81 TABLET ORAL 2 TIMES DAILY
Status: DISCONTINUED | OUTPATIENT
Start: 2023-07-07 | End: 2023-07-10 | Stop reason: HOSPADM

## 2023-07-07 RX ORDER — INSULIN LISPRO 100 [IU]/ML
0-8 INJECTION, SOLUTION INTRAVENOUS; SUBCUTANEOUS
Status: DISCONTINUED | OUTPATIENT
Start: 2023-07-07 | End: 2023-07-10 | Stop reason: HOSPADM

## 2023-07-07 RX ORDER — 0.9 % SODIUM CHLORIDE 0.9 %
500 INTRAVENOUS SOLUTION INTRAVENOUS ONCE
Status: COMPLETED | OUTPATIENT
Start: 2023-07-07 | End: 2023-07-07

## 2023-07-07 RX ADMIN — MEMANTINE HYDROCHLORIDE 5 MG: 5 TABLET ORAL at 20:45

## 2023-07-07 RX ADMIN — ACETAMINOPHEN 650 MG: 325 TABLET ORAL at 20:35

## 2023-07-07 RX ADMIN — CLONAZEPAM 1 MG: 0.5 TABLET ORAL at 20:37

## 2023-07-07 RX ADMIN — CEFTRIAXONE 1000 MG: 1 INJECTION, POWDER, FOR SOLUTION INTRAMUSCULAR; INTRAVENOUS at 14:09

## 2023-07-07 RX ADMIN — SODIUM CHLORIDE: 9 INJECTION, SOLUTION INTRAVENOUS at 13:11

## 2023-07-07 RX ADMIN — SODIUM CHLORIDE 500 ML: 9 INJECTION, SOLUTION INTRAVENOUS at 12:11

## 2023-07-07 RX ADMIN — Medication 10 ML: at 20:47

## 2023-07-07 RX ADMIN — ASPIRIN 81 MG: 81 TABLET, COATED ORAL at 20:37

## 2023-07-07 SDOH — SOCIAL STABILITY - SOCIAL INSECURITY: PROBLEM RELATED TO SOCIAL ENVIRONMENT, UNSPECIFIED: Z60.9

## 2023-07-07 ASSESSMENT — PAIN DESCRIPTION - LOCATION
LOCATION: HEAD

## 2023-07-07 ASSESSMENT — PAIN DESCRIPTION - PAIN TYPE
TYPE: ACUTE PAIN
TYPE: ACUTE PAIN

## 2023-07-07 ASSESSMENT — ENCOUNTER SYMPTOMS
TROUBLE SWALLOWING: 0
WHEEZING: 0
VOMITING: 0
DIARRHEA: 0
EYE REDNESS: 0
SHORTNESS OF BREATH: 0
BLOOD IN STOOL: 0
FACIAL SWELLING: 0
CONSTIPATION: 0
COUGH: 0
CHEST TIGHTNESS: 0
CHOKING: 0
EYE PAIN: 0
EYE DISCHARGE: 0
ABDOMINAL PAIN: 0
SORE THROAT: 0
BACK PAIN: 0
STRIDOR: 0
VOICE CHANGE: 0
SINUS PRESSURE: 0

## 2023-07-07 ASSESSMENT — PAIN - FUNCTIONAL ASSESSMENT
PAIN_FUNCTIONAL_ASSESSMENT: ACTIVITIES ARE NOT PREVENTED
PAIN_FUNCTIONAL_ASSESSMENT: 0-10
PAIN_FUNCTIONAL_ASSESSMENT: ACTIVITIES ARE NOT PREVENTED

## 2023-07-07 ASSESSMENT — PAIN DESCRIPTION - FREQUENCY
FREQUENCY: CONTINUOUS
FREQUENCY: CONTINUOUS

## 2023-07-07 ASSESSMENT — PAIN SCALES - GENERAL
PAINLEVEL_OUTOF10: 0
PAINLEVEL_OUTOF10: 3
PAINLEVEL_OUTOF10: 1

## 2023-07-07 ASSESSMENT — PAIN DESCRIPTION - DESCRIPTORS
DESCRIPTORS: ACHING
DESCRIPTORS: ACHING

## 2023-07-07 ASSESSMENT — PAIN DESCRIPTION - ONSET
ONSET: ON-GOING
ONSET: ON-GOING

## 2023-07-08 LAB
ANION GAP SERPL CALCULATED.3IONS-SCNC: 9 MEQ/L (ref 9–15)
BASOPHILS # BLD: 0.1 K/UL (ref 0–0.1)
BASOPHILS NFR BLD: 0.9 % (ref 0.2–1.2)
BUN SERPL-MCNC: 15 MG/DL (ref 8–23)
CALCIUM SERPL-MCNC: 9.4 MG/DL (ref 8.5–9.9)
CHLORIDE SERPL-SCNC: 106 MEQ/L (ref 95–107)
CO2 SERPL-SCNC: 26 MEQ/L (ref 20–31)
CREAT SERPL-MCNC: 1.51 MG/DL (ref 0.7–1.2)
EOSINOPHIL # BLD: 0.4 K/UL (ref 0–0.5)
EOSINOPHIL NFR BLD: 5.7 % (ref 0.8–7)
ERYTHROCYTE [DISTWIDTH] IN BLOOD BY AUTOMATED COUNT: 15.2 % (ref 11.6–14.4)
GLUCOSE BLD-MCNC: 105 MG/DL (ref 70–99)
GLUCOSE BLD-MCNC: 105 MG/DL (ref 70–99)
GLUCOSE BLD-MCNC: 123 MG/DL (ref 70–99)
GLUCOSE BLD-MCNC: 137 MG/DL (ref 70–99)
GLUCOSE BLD-MCNC: 155 MG/DL (ref 70–99)
GLUCOSE SERPL-MCNC: 118 MG/DL (ref 70–99)
HBA1C MFR BLD: 7.1 % (ref 4.8–5.9)
HCT VFR BLD AUTO: 37.3 % (ref 42–52)
HGB BLD-MCNC: 12.5 G/DL (ref 13.7–17.5)
IMM GRANULOCYTES # BLD: 0 K/UL
IMM GRANULOCYTES NFR BLD: 0.5 %
LYMPHOCYTES # BLD: 1.6 K/UL (ref 1.3–3.6)
LYMPHOCYTES NFR BLD: 20.2 %
MCH RBC QN AUTO: 28.3 PG (ref 25.7–32.2)
MCHC RBC AUTO-ENTMCNC: 33.5 % (ref 32.3–36.5)
MCV RBC AUTO: 84.6 FL (ref 79–92.2)
MONOCYTES # BLD: 0.5 K/UL (ref 0.3–0.8)
MONOCYTES NFR BLD: 6.8 % (ref 5.3–12.2)
NEUTROPHILS # BLD: 5.1 K/UL (ref 1.8–5.4)
NEUTS SEG NFR BLD: 65.9 % (ref 34–67.9)
PERFORMED ON: ABNORMAL
PLATELET # BLD AUTO: 175 K/UL (ref 163–337)
POTASSIUM SERPL-SCNC: 3.6 MEQ/L (ref 3.4–4.9)
RBC # BLD AUTO: 4.41 M/UL (ref 4.63–6.08)
SODIUM SERPL-SCNC: 141 MEQ/L (ref 135–144)
WBC # BLD AUTO: 7.8 K/UL (ref 4.2–9)

## 2023-07-08 PROCEDURE — 83036 HEMOGLOBIN GLYCOSYLATED A1C: CPT

## 2023-07-08 PROCEDURE — 97116 GAIT TRAINING THERAPY: CPT

## 2023-07-08 PROCEDURE — 36415 COLL VENOUS BLD VENIPUNCTURE: CPT

## 2023-07-08 PROCEDURE — 96366 THER/PROPH/DIAG IV INF ADDON: CPT

## 2023-07-08 PROCEDURE — G0378 HOSPITAL OBSERVATION PER HR: HCPCS

## 2023-07-08 PROCEDURE — 6370000000 HC RX 637 (ALT 250 FOR IP): Performed by: INTERNAL MEDICINE

## 2023-07-08 PROCEDURE — 96361 HYDRATE IV INFUSION ADD-ON: CPT

## 2023-07-08 PROCEDURE — 85025 COMPLETE CBC W/AUTO DIFF WBC: CPT

## 2023-07-08 PROCEDURE — 97162 PT EVAL MOD COMPLEX 30 MIN: CPT

## 2023-07-08 PROCEDURE — 2580000003 HC RX 258: Performed by: INTERNAL MEDICINE

## 2023-07-08 PROCEDURE — 80048 BASIC METABOLIC PNL TOTAL CA: CPT

## 2023-07-08 PROCEDURE — 6360000002 HC RX W HCPCS: Performed by: INTERNAL MEDICINE

## 2023-07-08 PROCEDURE — 97530 THERAPEUTIC ACTIVITIES: CPT

## 2023-07-08 RX ORDER — ALLOPURINOL 100 MG/1
200 TABLET ORAL DAILY
Status: DISCONTINUED | OUTPATIENT
Start: 2023-07-09 | End: 2023-07-10 | Stop reason: HOSPADM

## 2023-07-08 RX ORDER — ATORVASTATIN CALCIUM 10 MG/1
20 TABLET, FILM COATED ORAL
Status: DISCONTINUED | OUTPATIENT
Start: 2023-07-08 | End: 2023-07-10 | Stop reason: HOSPADM

## 2023-07-08 RX ORDER — ALOGLIPTIN 6.25 MG/1
6.25 TABLET, FILM COATED ORAL DAILY
Status: DISCONTINUED | OUTPATIENT
Start: 2023-07-08 | End: 2023-07-10 | Stop reason: HOSPADM

## 2023-07-08 RX ORDER — MEMANTINE HYDROCHLORIDE 5 MG/1
10 TABLET ORAL DAILY
Status: DISCONTINUED | OUTPATIENT
Start: 2023-07-08 | End: 2023-07-10 | Stop reason: HOSPADM

## 2023-07-08 RX ORDER — BRIMONIDINE TARTRATE 2 MG/ML
1 SOLUTION/ DROPS OPHTHALMIC 2 TIMES DAILY
Status: DISCONTINUED | OUTPATIENT
Start: 2023-07-08 | End: 2023-07-10 | Stop reason: HOSPADM

## 2023-07-08 RX ORDER — MEMANTINE HYDROCHLORIDE 10 MG/1
10 TABLET ORAL DAILY
COMMUNITY
Start: 2023-07-04

## 2023-07-08 RX ADMIN — ATORVASTATIN CALCIUM 20 MG: 10 TABLET, FILM COATED ORAL at 22:18

## 2023-07-08 RX ADMIN — ALOGLIPTIN 6.25 MG: 6.25 TABLET, FILM COATED ORAL at 11:13

## 2023-07-08 RX ADMIN — ENOXAPARIN SODIUM 40 MG: 100 INJECTION SUBCUTANEOUS at 08:47

## 2023-07-08 RX ADMIN — CLONAZEPAM 1 MG: 0.5 TABLET ORAL at 20:07

## 2023-07-08 RX ADMIN — ASPIRIN 81 MG: 81 TABLET, COATED ORAL at 20:06

## 2023-07-08 RX ADMIN — CEFTRIAXONE 1000 MG: 1 INJECTION, POWDER, FOR SOLUTION INTRAMUSCULAR; INTRAVENOUS at 14:50

## 2023-07-08 RX ADMIN — ACETAMINOPHEN 650 MG: 325 TABLET ORAL at 11:16

## 2023-07-08 RX ADMIN — Medication 10 ML: at 08:47

## 2023-07-08 RX ADMIN — BRIMONIDINE TARTRATE 1 DROP: 2 SOLUTION OPHTHALMIC at 22:19

## 2023-07-08 RX ADMIN — LEVOTHYROXINE SODIUM 100 MCG: 100 TABLET ORAL at 06:13

## 2023-07-08 RX ADMIN — MEMANTINE 10 MG: 5 TABLET ORAL at 20:07

## 2023-07-08 RX ADMIN — Medication 10 ML: at 22:18

## 2023-07-08 RX ADMIN — ASPIRIN 81 MG: 81 TABLET, COATED ORAL at 08:47

## 2023-07-08 ASSESSMENT — PAIN SCALES - GENERAL
PAINLEVEL_OUTOF10: 2
PAINLEVEL_OUTOF10: 0

## 2023-07-08 ASSESSMENT — PAIN DESCRIPTION - LOCATION: LOCATION: HEAD

## 2023-07-08 ASSESSMENT — PAIN DESCRIPTION - DESCRIPTORS: DESCRIPTORS: ACHING

## 2023-07-09 LAB
BACTERIA UR CULT: ABNORMAL
BACTERIA UR CULT: ABNORMAL
GLUCOSE BLD-MCNC: 114 MG/DL (ref 70–99)
GLUCOSE BLD-MCNC: 114 MG/DL (ref 70–99)
GLUCOSE BLD-MCNC: 115 MG/DL (ref 70–99)
GLUCOSE BLD-MCNC: 122 MG/DL (ref 70–99)
ORGANISM: ABNORMAL
PERFORMED ON: ABNORMAL

## 2023-07-09 PROCEDURE — G0378 HOSPITAL OBSERVATION PER HR: HCPCS

## 2023-07-09 PROCEDURE — 6370000000 HC RX 637 (ALT 250 FOR IP): Performed by: INTERNAL MEDICINE

## 2023-07-09 PROCEDURE — 2580000003 HC RX 258: Performed by: INTERNAL MEDICINE

## 2023-07-09 PROCEDURE — 92523 SPEECH SOUND LANG COMPREHEN: CPT

## 2023-07-09 PROCEDURE — 96366 THER/PROPH/DIAG IV INF ADDON: CPT

## 2023-07-09 PROCEDURE — 96372 THER/PROPH/DIAG INJ SC/IM: CPT

## 2023-07-09 PROCEDURE — 6360000002 HC RX W HCPCS: Performed by: INTERNAL MEDICINE

## 2023-07-09 RX ADMIN — Medication 10 ML: at 08:03

## 2023-07-09 RX ADMIN — LEVOTHYROXINE SODIUM 100 MCG: 100 TABLET ORAL at 05:47

## 2023-07-09 RX ADMIN — MEMANTINE 10 MG: 5 TABLET ORAL at 20:27

## 2023-07-09 RX ADMIN — ENOXAPARIN SODIUM 40 MG: 100 INJECTION SUBCUTANEOUS at 08:02

## 2023-07-09 RX ADMIN — ASPIRIN 81 MG: 81 TABLET, COATED ORAL at 08:02

## 2023-07-09 RX ADMIN — ATORVASTATIN CALCIUM 20 MG: 10 TABLET, FILM COATED ORAL at 20:20

## 2023-07-09 RX ADMIN — CEFTRIAXONE 1000 MG: 1 INJECTION, POWDER, FOR SOLUTION INTRAMUSCULAR; INTRAVENOUS at 13:47

## 2023-07-09 RX ADMIN — Medication 10 ML: at 20:22

## 2023-07-09 RX ADMIN — CLONAZEPAM 1 MG: 0.5 TABLET ORAL at 20:20

## 2023-07-09 RX ADMIN — BRIMONIDINE TARTRATE 1 DROP: 2 SOLUTION OPHTHALMIC at 20:27

## 2023-07-09 RX ADMIN — ALOGLIPTIN 6.25 MG: 6.25 TABLET, FILM COATED ORAL at 08:02

## 2023-07-09 RX ADMIN — ASPIRIN 81 MG: 81 TABLET, COATED ORAL at 20:20

## 2023-07-09 RX ADMIN — ALLOPURINOL 200 MG: 100 TABLET ORAL at 08:02

## 2023-07-09 RX ADMIN — BRIMONIDINE TARTRATE 1 DROP: 2 SOLUTION OPHTHALMIC at 08:03

## 2023-07-09 RX ADMIN — ACETAMINOPHEN 650 MG: 325 TABLET ORAL at 20:19

## 2023-07-09 ASSESSMENT — PAIN DESCRIPTION - DESCRIPTORS: DESCRIPTORS: ACHING

## 2023-07-09 ASSESSMENT — PAIN DESCRIPTION - LOCATION: LOCATION: HEAD

## 2023-07-09 ASSESSMENT — PAIN SCALES - GENERAL: PAINLEVEL_OUTOF10: 5

## 2023-07-10 VITALS
WEIGHT: 215.1 LBS | HEART RATE: 60 BPM | OXYGEN SATURATION: 99 % | HEIGHT: 77 IN | DIASTOLIC BLOOD PRESSURE: 69 MMHG | SYSTOLIC BLOOD PRESSURE: 133 MMHG | RESPIRATION RATE: 18 BRPM | TEMPERATURE: 97.9 F | BODY MASS INDEX: 25.4 KG/M2

## 2023-07-10 LAB
ANION GAP SERPL CALCULATED.3IONS-SCNC: 9 MEQ/L (ref 9–15)
BUN SERPL-MCNC: 14 MG/DL (ref 8–23)
CALCIUM SERPL-MCNC: 9.3 MG/DL (ref 8.5–9.9)
CHLORIDE SERPL-SCNC: 107 MEQ/L (ref 95–107)
CO2 SERPL-SCNC: 25 MEQ/L (ref 20–31)
CREAT SERPL-MCNC: 1.42 MG/DL (ref 0.7–1.2)
GLUCOSE BLD-MCNC: 99 MG/DL (ref 70–99)
GLUCOSE SERPL-MCNC: 108 MG/DL (ref 70–99)
PERFORMED ON: NORMAL
POTASSIUM SERPL-SCNC: 3.8 MEQ/L (ref 3.4–4.9)
SODIUM SERPL-SCNC: 141 MEQ/L (ref 135–144)

## 2023-07-10 PROCEDURE — 2580000003 HC RX 258: Performed by: INTERNAL MEDICINE

## 2023-07-10 PROCEDURE — 80048 BASIC METABOLIC PNL TOTAL CA: CPT

## 2023-07-10 PROCEDURE — 6360000002 HC RX W HCPCS: Performed by: INTERNAL MEDICINE

## 2023-07-10 PROCEDURE — 6370000000 HC RX 637 (ALT 250 FOR IP): Performed by: INTERNAL MEDICINE

## 2023-07-10 PROCEDURE — 96372 THER/PROPH/DIAG INJ SC/IM: CPT

## 2023-07-10 PROCEDURE — 36415 COLL VENOUS BLD VENIPUNCTURE: CPT

## 2023-07-10 PROCEDURE — G0378 HOSPITAL OBSERVATION PER HR: HCPCS

## 2023-07-10 RX ORDER — POLYETHYLENE GLYCOL 3350 17 G/17G
17 POWDER, FOR SOLUTION ORAL ONCE
Status: COMPLETED | OUTPATIENT
Start: 2023-07-10 | End: 2023-07-10

## 2023-07-10 RX ORDER — NITROFURANTOIN 25; 75 MG/1; MG/1
100 CAPSULE ORAL EVERY 12 HOURS SCHEDULED
Qty: 14 CAPSULE | Refills: 0 | Status: SHIPPED | OUTPATIENT
Start: 2023-07-10 | End: 2023-07-17

## 2023-07-10 RX ORDER — NITROFURANTOIN 25; 75 MG/1; MG/1
100 CAPSULE ORAL EVERY 12 HOURS SCHEDULED
Status: DISCONTINUED | OUTPATIENT
Start: 2023-07-10 | End: 2023-07-10 | Stop reason: HOSPADM

## 2023-07-10 RX ORDER — ALOGLIPTIN 6.25 MG/1
6.25 TABLET, FILM COATED ORAL DAILY
Qty: 120 TABLET | Refills: 0 | Status: SHIPPED | OUTPATIENT
Start: 2023-07-11

## 2023-07-10 RX ADMIN — Medication 10 ML: at 08:37

## 2023-07-10 RX ADMIN — LEVOTHYROXINE SODIUM 100 MCG: 100 TABLET ORAL at 06:27

## 2023-07-10 RX ADMIN — POLYETHYLENE GLYCOL 3350 17 G: 17 POWDER, FOR SOLUTION ORAL at 08:35

## 2023-07-10 RX ADMIN — ASPIRIN 81 MG: 81 TABLET, COATED ORAL at 08:35

## 2023-07-10 RX ADMIN — ALOGLIPTIN 6.25 MG: 6.25 TABLET, FILM COATED ORAL at 08:36

## 2023-07-10 RX ADMIN — BRIMONIDINE TARTRATE 1 DROP: 2 SOLUTION OPHTHALMIC at 08:36

## 2023-07-10 RX ADMIN — NITROFURANTOIN MONOHYDRATE/MACROCRYSTALS 100 MG: 25; 75 CAPSULE ORAL at 08:35

## 2023-07-10 RX ADMIN — ENOXAPARIN SODIUM 40 MG: 100 INJECTION SUBCUTANEOUS at 08:35

## 2023-07-10 RX ADMIN — ALLOPURINOL 200 MG: 100 TABLET ORAL at 08:36

## 2023-07-10 NOTE — DISCHARGE SUMMARY
Discharge Summary    Patient:  Matthew Lanier  YOB: 1950    MRN: [id number]   Acct: [de-identified]    Primary Care Physician: Tabatha Gutierrez MD    Admit date:  7/7/2023    Discharge date:   07/10/23      Discharge Diagnoses:   UTI (urinary tract infection)  Principal Problem:    UTI (urinary tract infection)  Resolved Problems:    * No resolved hospital problems. *      Admitted for: [facility] Course: patient was admitted and treated for UTI. UC grow Citrobacter resistant to cetriaxone- initiated macrobid. Regarding aggressive behavioral at home per his wife report- patient remained calm and cooperative during his hospital stay. Was evaluated by speech therapy and has mild cognitive declining -   MMSE 24/30   After completing his treatment will be DC home with PO macrobid x 7 days      Consultants:  PT    Discharge Medications:       Medication List        START taking these medications      nitrofurantoin (macrocrystal-monohydrate) 100 MG capsule  Commonly known as: MACROBID  Take 1 capsule by mouth every 12 hours for 14 doses            CHANGE how you take these medications      memantine 10 MG tablet  Commonly known as: NAMENDA  What changed: Another medication with the same name was removed. Continue taking this medication, and follow the directions you see here.             CONTINUE taking these medications      aspirin 81 MG EC tablet  Take 1 tablet by mouth 2 times daily     clonazePAM 2 MG tablet  Commonly known as: KLONOPIN     levalbuterol 45 MCG/ACT inhaler  Commonly known as: XOPENEX HFA     levothyroxine 100 MCG tablet  Commonly known as: SYNTHROID     lovastatin 40 MG tablet  Commonly known as: MEVACOR     metFORMIN 500 MG extended release tablet  Commonly known as: GLUCOPHAGE-XR     Respiratory Therapy Supplies Marizol  Change CPAP pressure to 6 cm   New CPAP mask and supplies            STOP taking these medications      allopurinol 300 MG tablet  Commonly known as:

## 2023-07-10 NOTE — PROGRESS NOTES
Pt alert and oriented. Pt has a headache, medicated per mar. Pt vitals stable. Pt independent in room. Pt call light in reach. Pt denies any needs at this time. Will continue to monitor pt.   Electronically signed by Ilana Wen RN on 7/9/2023 at 10:28 PM

## 2023-07-10 NOTE — DISCHARGE INSTR - COC
Continuity of Care Form    Patient Name: Mica Larson   :  1950  MRN:  [id number]    Admit date:  2023  Discharge date:  ***    Code Status Order: Full Code   Advance Directives:     Admitting Physician:  Genevieve Tirado MD  PCP: Naveed Ojeda MD    Discharging Nurse: [facility]/Room#: 0206/0206-01  Discharging Unit Phone Number: ***    Emergency Contact:   Extended Emergency Contact Information  Primary Emergency Contact: [address]  Mobile Phone: [phone]  Relation: Spouse  Preferred language: English    Past Surgical History:  Past Surgical History:   Procedure Laterality Date    COLONOSCOPY      HAND SURGERY Right     ND COLON CA SCRN NOT HI RSK IND N/A 8/15/2018    COLONOSCOPY performed by Frank Shah MD at [address] [facility] 2019    SIGMOIDOSCOPY W/ DILATION performed by Frank Shha MD at [address] Left 3/15/2022    LEFT TOTAL KNEE ARTHROPLASTY. Lisa Pena PSI performed by Carlos Sheffield MD at [facility] OR       Immunization History:   Immunization History   Administered Date(s) Administered    COVID-19, MODERNA Bivalent, (age 12y+), IM, 48 mcg/0.5 mL 2023       Active Problems:  Patient Active Problem List   Diagnosis Code    Obstructive sleep apnea G47.33    Borderline diabetes R73.03    Asthma-chronic obstructive pulmonary disease overlap syndrome (HCC) J44.9    Hyperopia of both eyes with astigmatism and presbyopia H52.03, H52.203, H52.4    Personal history of tobacco use, presenting hazards to health Z87.891    Primary open angle glaucoma (POAG) of left eye, moderate stage H40.1122    Pseudophakia of both eyes Z96.1    S/P laser iridotomy Z98.890    Glaucoma H40.9    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 8XXA    Type 2 diabetes mellitus (HCC) E11.9    Wheezing R06.2    Proctitis K62.89    AMS (altered mental status) R41.82    Hyperlipidemia with target

## 2023-07-10 NOTE — PROGRESS NOTES
Jeris Schirmer Initial Discharge Assessment    Met with Patient to discuss discharge plan. PCP: Chelsea Childers MD                                  Date of Last Visit: \" about 3 weeks ago\"    If no PCP, list provided? N/A    Discharge Planning    Living Arrangements: independently at home    Who do you live with? Spouse     Who helps you with your care:  self    If lives at home:     Do you have any barriers navigating in your home? no    Patient can perform ADL? Yes    Current Services (outpatient and in home) :  Meals on Wheels ([place] )    Dialysis: No    Is transportation available to get to your appointments? Yes    DME Equipment:  no.  Patient reports that he has a walker \"when I had my knee replaced just about a  year ago. \"  Patient identifies no DME needs at this time    Respiratory equipment: None    Respiratory provider:  no     Pharmacy:  yes - [facility] in [address] [place] [address] to afford cost of medication: Yes    Does patient feel safe at home? Yes    Does patient identify any home going needs? No    Patient agreeable to [facility]? No- patient and spouse report no interest in [facility] at this time    Patient agreeable to SNF/Rehab? No    Other discharge needs identified? Other Mental health eval recommended by PCP. List of mental health providers given    Was Freedom of Choice Provided? Yes    Initial Discharge Plan? Chart reviewed. Patient's care discussed in daily quality rounds. Patient reported to have been admitted to [facility] and treated for UTI with IV antibiotics. Plan is for patient to return home with spouse on oral antibiotics. It was noted that patient's spouse reported patient demonstrated aggressive behavior at home. This  contacted patient's spouse Jamaal Martinez with patient's verbal permission. Jamaal Martinez reports that patient demonstrated a change in behavior, \"it all started 2 weeks ago. \"  Spouse went on to report that patient, Danna Taylor came

## 2023-08-06 PROBLEM — N39.0 UTI (URINARY TRACT INFECTION): Status: RESOLVED | Noted: 2023-07-07 | Resolved: 2023-08-06

## 2023-08-31 ENCOUNTER — HOSPITAL ENCOUNTER (OUTPATIENT)
Dept: LAB | Age: 73
Discharge: HOME | End: 2023-08-31
Payer: MEDICARE

## 2023-08-31 LAB
C-REACTIVE PROTEIN, HIGH SENSITIVITY: 1.8 MG/L (ref 0–5)
ERYTHROCYTE [SEDIMENTATION RATE] IN BLOOD BY WESTERGREN METHOD: 6 MM (ref 0–20)
FOLATE: 16.9 NG/ML
HBA1C MFR BLD: 6.7 % (ref 4.8–5.9)
HOMOCYSTEINE: 16 UMOL/L
RHEUMATOID FACTOR: <10 IU/ML
T4 FREE SERPL-MCNC: 1.1 NG/DL (ref 0.84–1.68)
TSH SERPL-MCNC: 2.52 UIU/ML (ref 0.44–3.86)
VITAMIN B-12: 439 PG/ML (ref 232–1245)

## 2023-08-31 PROCEDURE — 84156 ASSAY OF PROTEIN URINE: CPT

## 2023-08-31 PROCEDURE — 86788 WEST NILE VIRUS AB IGM: CPT

## 2023-08-31 PROCEDURE — 82746 ASSAY OF FOLIC ACID SERUM: CPT

## 2023-08-31 PROCEDURE — 36415 COLL VENOUS BLD VENIPUNCTURE: CPT

## 2023-08-31 PROCEDURE — 83921 ORGANIC ACID SINGLE QUANT: CPT

## 2023-08-31 PROCEDURE — 86789 WEST NILE VIRUS ANTIBODY: CPT

## 2023-08-31 PROCEDURE — 86141 C-REACTIVE PROTEIN HS: CPT

## 2023-08-31 PROCEDURE — 86431 RHEUMATOID FACTOR QUANT: CPT

## 2023-08-31 PROCEDURE — 84165 PROTEIN E-PHORESIS SERUM: CPT

## 2023-08-31 PROCEDURE — 84207 ASSAY OF VITAMIN B-6: CPT

## 2023-08-31 PROCEDURE — 83090 ASSAY OF HOMOCYSTEINE: CPT

## 2023-08-31 PROCEDURE — 86335 IMMUNFIX E-PHORSIS/URINE/CSF: CPT

## 2023-08-31 PROCEDURE — 84155 ASSAY OF PROTEIN SERUM: CPT

## 2023-08-31 PROCEDURE — 86617 LYME DISEASE ANTIBODY: CPT

## 2023-08-31 PROCEDURE — 86592 SYPHILIS TEST NON-TREP QUAL: CPT

## 2023-08-31 PROCEDURE — 83036 HEMOGLOBIN GLYCOSYLATED A1C: CPT

## 2023-08-31 PROCEDURE — 82607 VITAMIN B-12: CPT

## 2023-08-31 PROCEDURE — 85652 RBC SED RATE AUTOMATED: CPT

## 2023-08-31 PROCEDURE — 84443 ASSAY THYROID STIM HORMONE: CPT

## 2023-08-31 PROCEDURE — 84439 ASSAY OF FREE THYROXINE: CPT

## 2023-08-31 PROCEDURE — 86038 ANTINUCLEAR ANTIBODIES: CPT

## 2023-09-01 LAB — RPR SER QL: NORMAL

## 2023-09-02 LAB
B BURGDOR IGG SER QL IB: NEGATIVE
B BURGDOR IGM SER QL IB: POSITIVE
INTERPRETATION UR IFE-IMP: NORMAL
NUCLEAR IGG SER QL IA: NORMAL
WNV IGG SER-ACNC: 0.11 IV
WNV IGM SER-ACNC: 0 IV

## 2023-09-03 LAB
METHYLMALONATE SERPL-SCNC: 0.37 UMOL/L (ref 0–0.4)
VIT B6 SERPL-MCNC: 28.8 NMOL/L (ref 20–125)

## 2023-09-05 LAB
ALBUMIN SERPL-MCNC: 3.89 G/DL (ref 3.75–5.01)
ALPHA1 GLOB SERPL ELPH-MCNC: 0.29 G/DL (ref 0.19–0.46)
ALPHA2 GLOB SERPL ELPH-MCNC: 0.68 G/DL (ref 0.48–1.05)
B-GLOBULIN SERPL ELPH-MCNC: 0.99 G/DL (ref 0.48–1.1)
GAMMA GLOB SERPL ELPH-MCNC: 1.15 G/DL (ref 0.62–1.51)
INTERPRETATION SERPL IFE-IMP: NORMAL
PROT SERPL-MCNC: 7 G/DL (ref 6.3–8.2)
PROTEIN ELECTROPHORESIS, SERUM: NORMAL

## 2023-09-12 ENCOUNTER — HOSPITAL ENCOUNTER (OUTPATIENT)
Dept: PHYSICAL THERAPY | Age: 73
Setting detail: THERAPIES SERIES
Discharge: HOME | End: 2023-09-12
Payer: MEDICARE

## 2023-09-12 PROCEDURE — 97162 PT EVAL MOD COMPLEX 30 MIN: CPT

## 2023-09-12 PROCEDURE — 97530 THERAPEUTIC ACTIVITIES: CPT

## 2023-09-12 PROCEDURE — 97110 THERAPEUTIC EXERCISES: CPT

## 2023-09-12 PROCEDURE — 97140 MANUAL THERAPY 1/> REGIONS: CPT

## 2023-09-12 ASSESSMENT — PAIN SCALES - GENERAL: PAINLEVEL_OUTOF10: 8

## 2023-09-12 ASSESSMENT — PAIN DESCRIPTION - PAIN TYPE: TYPE: CHRONIC PAIN

## 2023-09-12 ASSESSMENT — PAIN DESCRIPTION - ORIENTATION: ORIENTATION: RIGHT;LEFT;MID

## 2023-09-12 NOTE — PROGRESS NOTES
Physical Therapy: Initial Evaluation    Patient: Ismael Geller (62 y.o. male)   Examination Date:   Plan of Care Certification Period: 2023 to        :  1950 ;    Confirmed: Yes MRN: [id number]  CSN: [id number]   Insurance: Payor: [employer] / Plan: Merlinda Hemp ESSENTIAL/PLUS / Product Type: *No Product type* /   Insurance ID: [id number] - (Medicare Managed) Secondary Insurance (if applicable):    Referring Physician: MD Dr Steve Davidson   PCP: Prem Carney MD Visits to Date/Visits Approved: 1 / 10    No Show/Cancelled Appts: 0      Medical Diagnosis: Other muscle spasm [M62.838] cervical paraspinal muscle spasm  Treatment Diagnosis: cervical paraspinal muscle spasms with neck pain and dec functional use of shoulders     PERTINENT MEDICAL HISTORY      Self reported health status[de-identified] Fair    Medical History: Chart Reviewed: Yes   Past Medical History:   Diagnosis Date    COPD (chronic obstructive pulmonary disease) ([address])     Diabetes mellitus ([address])     Hyperlipidemia     Hypertension     Lung disease     Meningitis spinal      Surgical History:   Past Surgical History:   Procedure Laterality Date    COLONOSCOPY      HAND SURGERY Right     OR COLON CA SCRN NOT HI RSK IND N/A 8/15/2018    COLONOSCOPY performed by Cierra Holman MD at [address] [facility] N/A 2019    SIGMOIDOSCOPY W/ DILATION performed by Cierra Holman MD at [address] Left 3/15/2022    LEFT TOTAL KNEE ARTHROPLASTY.  Margarita Gonzalez PSI performed by Rosibel Bhandari MD at [address]       Medications:   Current Outpatient Medications:     alogliptin (NESINA) 6.25 MG TABS tablet, Take 1 tablet by mouth daily, Disp: 120 tablet, Rfl: 0    memantine (NAMENDA) 10 MG tablet, Take 1 tablet by mouth daily, Disp: , Rfl:     aspirin 81 MG EC tablet, Take 1 tablet by mouth 2 times daily, Disp: 30 tablet, Rfl: 0    Respiratory Therapy Supplies SOURAV, Change

## 2023-09-19 ENCOUNTER — HOSPITAL ENCOUNTER (OUTPATIENT)
Dept: PHYSICAL THERAPY | Age: 73
Setting detail: THERAPIES SERIES
Discharge: HOME | End: 2023-09-19
Payer: MEDICARE

## 2023-09-19 PROCEDURE — 97110 THERAPEUTIC EXERCISES: CPT

## 2023-09-19 PROCEDURE — 97012 MECHANICAL TRACTION THERAPY: CPT

## 2023-09-19 PROCEDURE — 97140 MANUAL THERAPY 1/> REGIONS: CPT

## 2023-09-19 ASSESSMENT — PAIN DESCRIPTION - LOCATION: LOCATION: NECK

## 2023-09-19 ASSESSMENT — PAIN SCALES - GENERAL: PAINLEVEL_OUTOF10: 5

## 2023-09-19 ASSESSMENT — PAIN DESCRIPTION - PAIN TYPE: TYPE: CHRONIC PAIN

## 2023-09-19 NOTE — PROGRESS NOTES
Physical Therapy  Physical Therapy: Daily Note   Patient: Carmen Lowery (61 y.o. male)   Examination Date: 2023  No data recorded  No data recorded   :  1950 # of Visits since [facility]:   2   MRN: [id number]  CSN: [id number] Start of Care Date:   2023   Insurance: Payor: [facility] / Plan: Saige Bailey ESSENTIAL/PLUS / Product Type: *No Product type* /   Insurance ID: [id number] - (Medicare Managed) Secondary Insurance (if applicable):    Referring Physician: MD Dr Von Chaidez   PCP: Terri Parks MD Visits to Date/Visits Approved: 2 / 10    No Show/Cancelled Appts: 0 / 0     Medical Diagnosis: Other muscle spasm [M62.838]    Treatment Diagnosis: cervical paraspinal muscle spasms with neck pain and dec functional use of shoulders        SUBJECTIVE EXAMINATION   Pain Level: Pain Screening  Patient Currently in Pain: Yes  Pain Assessment: 0-10  Pain Level: 5  Pain Type: Chronic pain  Pain Location: Neck    Patient Comments: Subjective: Pt. states he feels stiff today in neck no radicular pain noted    HEP Compliance: Good        OBJECTIVE EXAMINATION   Restrictions:  Restrictions/Precautions: Up as Tolerated   No data recorded Implants present? : Metal implants (L)                TREATMENT     Exercises:      Treatment Reasoning    Exercise 1: tall sit scapular retractions 5 hold x 10 reps  Exercise 2: cervical retractions 3 hold x 10 reps max VC and tactle cues for form  Exercise 3: hooklying horizontal abduction , one pillow with 10 deep breaths , in nose /out mouth or for 2 minutes to stretch pecs  Exercise 4: capital D stretch x 10 reps in tall sitting slow/stiff  Exercise 5: tall seated bilat cervical rotations x 5 hold 5-10 sec  Exercise 6: tall seated bilat cervical sidebending x 5 hold 5-10 sec  Exercise 7: tall seated bilat cervical flexion and extension x 5 hold 5 sec                          Manual Therapy: (CPT 37511) Treatment Reasoning     Joint Mobilization: suboccipital

## 2023-09-26 ENCOUNTER — HOSPITAL ENCOUNTER (OUTPATIENT)
Dept: PHYSICAL THERAPY | Age: 73
Setting detail: THERAPIES SERIES
Discharge: HOME | End: 2023-09-26
Payer: MEDICARE

## 2023-09-26 PROCEDURE — 97110 THERAPEUTIC EXERCISES: CPT

## 2023-09-26 PROCEDURE — 97012 MECHANICAL TRACTION THERAPY: CPT

## 2023-09-26 PROCEDURE — 97140 MANUAL THERAPY 1/> REGIONS: CPT

## 2023-09-26 ASSESSMENT — PAIN DESCRIPTION - ORIENTATION: ORIENTATION: RIGHT

## 2023-09-26 ASSESSMENT — PAIN DESCRIPTION - DESCRIPTORS: DESCRIPTORS: ACHING;SORE

## 2023-09-26 ASSESSMENT — PAIN DESCRIPTION - PAIN TYPE: TYPE: CHRONIC PAIN

## 2023-09-26 ASSESSMENT — PAIN DESCRIPTION - LOCATION: LOCATION: NECK

## 2023-09-26 NOTE — PROGRESS NOTES
Posture, Pain modulation                     Manual Therapy: (CPT 17461) Treatment Reasoning      PROM Cervical spine with distraction and STM to B UT,scalenes and cervical paraspinals. Modalities  Mechanical Traction: Cervical  (CPT O3443447) Treatment Reasoning    Patient Position: Supine hook-lying  Type of traction: Intermittent  Amount of force: 22#/3#  Mechanical traction settings: 45 sec on 15 sec off  Post treatment skin assessment: Intact Limitations addressed: Pain modulation                             ASSESSMENT     Assessment: Assessment: Cues for postural awareness and dec slumping throughout ANA LILIA. Was able to progress with postural strengthening with cues for posture and form thus not added to HEP yet. Reapplied KT tape for postural support. Perofmed manual therapy with good response, demo L lateral tilt and slight rotation to R. Continued with cervical mechanical traction with inc pull to dec radiculopathy. Pain post is 3-410 R side of neck denies radicular pain in arm. CP issued to go. Body Structures, Functions, Activity Limitations Requiring Skilled Therapeutic Intervention: Decreased functional mobility , Decreased ROM, Decreased strength, Decreased ADL status, Decreased posture, Increased pain    Post-Treatment Pain Level: Activity Tolerance: Patient limited by pain    Therapy Prognosis: Good       GOALS   Patient Goals : \"I want my neck to hurt less, so I can reach better. \"  Short Term Goals Completed by 1-2 weeks Current Status Goal Status   Pt reporting any decrease in neck and BUE radicular pain c/o 9/12/23: 7-8/10 in neck and shoulders     Pt demonstrating better upright posture with dec fwd head and dec shoulder protraction for at least a portion of tx without PT reminders 912/23 head fwd 3.5 inches , mild to moderate protracted B shoulders     Increase AROM B shoulders to >= 90 deg abduction before scaption 9/12/23 shoulder abduction L 84, R 82 deg     Pt

## 2023-10-03 ENCOUNTER — HOSPITAL ENCOUNTER (OUTPATIENT)
Dept: PHYSICAL THERAPY | Age: 73
Setting detail: THERAPIES SERIES
Discharge: HOME | End: 2023-10-03

## 2023-10-03 NOTE — PROGRESS NOTES
Physical Therapy  Physical Therapy: Daily Note   Patient: Shilpi Millan (13 y.o. male)   Examination Date: 10/03/2023  No data recorded  No data recorded   :  1950 # of Visits since [facility]:   4   MRN: [id number]  CSN: [id number] Start of Care Date:   2023   Insurance: Payor: [employer] / Plan: Vish Houston ESSENTIAL/PLUS / Product Type: *No Product type* /   Insurance ID: [id number] - (Medicare Managed) Secondary Insurance (if applicable):    Referring Physician: MD Dr Andre Adler   PCP: Sherren Penna, MD Visits to Date/Visits Approved: 3 / 10    No Show/Cancelled Appts: 0 / 1     Medical Diagnosis: Other muscle spasm [M62.838] cervical paraspinal muscle spasm  Treatment Diagnosis: cervical paraspinal muscle spasms with neck pain and dec functional use of shoulders          ASSESSMENT       Therapy Time  Individual Time In:   130      Individual Time Out:    Minutes:  0        Electronically signed by Debi Frenández PTA  on 10/3/2023 at 4:22 PM   POC NOTE

## 2023-10-17 ENCOUNTER — HOSPITAL ENCOUNTER (OUTPATIENT)
Dept: PHYSICAL THERAPY | Age: 73
Setting detail: THERAPIES SERIES
Discharge: HOME | End: 2023-10-17
Payer: MEDICARE

## 2023-10-17 PROCEDURE — 97110 THERAPEUTIC EXERCISES: CPT

## 2023-10-17 PROCEDURE — 97530 THERAPEUTIC ACTIVITIES: CPT

## 2023-10-17 NOTE — PROGRESS NOTES
shoulder abd 120 deg Met   Pt reporting HEP at least once a day 5/7 days Pt reports doing HEP at leasat 5/7 days per wk Met                                                       Long Term Goals Completed by 4-5 weeks up to 10 visits Current Status Goal Status   Pt reporting neck pain at <= 3/10 for at least 50% of day and radicular UE sx reduced by >= 50% from evaluation in order to have better funcitonal reach lateral with less pain per pt report GOAL not yet met; pain ave 5-6/10 neck into shoulders with some improvement regarding UE sxs. Partially met   Increase AROM B shoulders to >= 150 deg flex R; >= 110deg abd B, and L ext rot to >= 50 deg GOAL partially met Partially met, In progress   Decrease NDI neck disability index from 25/45= 55% disabilty at eval to <= 30% to show better reach with less pain fro daily dressing and yardwork       Pt demonstrating improved posture with FWD head <2 inches and <= min portracted shoulders. GOAL met:  Forward head approx 2 inches with mild bilat shoulder protraction  Goal Met   Pt competent advanced HEP for neck , shoulders and posture Have been progressin HEP as tolerated, issued green TBand and copy of HEP for progression Partially met, In progress                                                TREATMENT PLAN   Plan Frequency: Hold PT per pt request until MD followup in approx 1 month           Therapy Time  Individual Time In:     1210    Individual Time Out:  1310  Minutes:  60        Electronically signed by Jhonny Álvarez PT  on 10/17/2023 at 1:56 PM   POC NOTE

## 2023-10-26 PROBLEM — L24.A9 WOUND DRAINAGE: Status: ACTIVE | Noted: 2023-10-26

## 2023-10-26 PROBLEM — M79.89 SWELLING OF LEFT LOWER EXTREMITY: Status: ACTIVE | Noted: 2023-10-26

## 2023-10-26 PROBLEM — N32.9 BLADDER TROUBLES: Status: ACTIVE | Noted: 2023-10-26

## 2023-10-26 PROBLEM — M17.11 RIGHT KNEE DJD: Status: ACTIVE | Noted: 2023-10-26

## 2023-10-26 PROBLEM — K63.9 BOWEL TROUBLE: Status: ACTIVE | Noted: 2023-10-26

## 2023-10-26 PROBLEM — M17.12 LEFT KNEE DJD: Status: ACTIVE | Noted: 2023-10-26

## 2023-10-26 PROBLEM — E11.9 DIABETES (MULTI): Status: ACTIVE | Noted: 2023-10-26

## 2023-10-26 RX ORDER — DICLOFENAC SODIUM 75 MG/1
1 TABLET, DELAYED RELEASE ORAL 2 TIMES DAILY
COMMUNITY
Start: 2020-05-01

## 2023-10-26 RX ORDER — ALLOPURINOL
POWDER (GRAM) MISCELLANEOUS
COMMUNITY

## 2023-10-26 RX ORDER — LOVASTATIN 100 %
POWDER (GRAM) MISCELLANEOUS
COMMUNITY

## 2023-10-26 RX ORDER — SULFAMETHOXAZOLE AND TRIMETHOPRIM 800; 160 MG/1; MG/1
1 TABLET ORAL 2 TIMES DAILY
COMMUNITY
Start: 2022-03-28

## 2023-10-26 RX ORDER — CLONAZEPAM 100 %
POWDER (GRAM) MISCELLANEOUS
COMMUNITY

## 2023-10-26 RX ORDER — GABAPENTIN 100 %
POWDER (GRAM) MISCELLANEOUS
COMMUNITY

## 2023-10-26 RX ORDER — MELOXICAM 15 MG/1
15 TABLET ORAL DAILY
COMMUNITY
Start: 2021-07-30

## 2023-10-26 RX ORDER — TRAMADOL HYDROCHLORIDE 50 MG/1
50 TABLET ORAL EVERY 8 HOURS
COMMUNITY
Start: 2022-04-22

## 2023-10-26 RX ORDER — LEVOTHYROXINE SODIUM 100 UG/1
100 TABLET ORAL
COMMUNITY

## 2023-10-26 RX ORDER — LEVALBUTEROL INHALATION SOLUTION 0.31 MG/3ML
SOLUTION RESPIRATORY (INHALATION)
COMMUNITY

## 2023-10-26 RX ORDER — PREDNISONE 10 MG/1
TABLET ORAL
COMMUNITY

## 2023-10-27 ENCOUNTER — OFFICE VISIT (OUTPATIENT)
Dept: ORTHOPEDIC SURGERY | Facility: CLINIC | Age: 73
End: 2023-10-27
Payer: MEDICARE

## 2023-10-27 DIAGNOSIS — Z96.652 S/P TOTAL KNEE ARTHROPLASTY, LEFT: ICD-10-CM

## 2023-10-27 DIAGNOSIS — M17.11 PRIMARY OSTEOARTHRITIS OF RIGHT KNEE: Primary | ICD-10-CM

## 2023-10-27 PROCEDURE — 1159F MED LIST DOCD IN RCRD: CPT | Performed by: ORTHOPAEDIC SURGERY

## 2023-10-27 PROCEDURE — 99214 OFFICE O/P EST MOD 30 MIN: CPT | Performed by: ORTHOPAEDIC SURGERY

## 2023-10-27 PROCEDURE — 20610 DRAIN/INJ JOINT/BURSA W/O US: CPT | Performed by: ORTHOPAEDIC SURGERY

## 2023-10-27 RX ORDER — LIDOCAINE HYDROCHLORIDE 10 MG/ML
5 INJECTION INFILTRATION; PERINEURAL
Status: COMPLETED | OUTPATIENT
Start: 2023-10-27 | End: 2023-10-27

## 2023-10-27 RX ORDER — BETAMETHASONE SODIUM PHOSPHATE AND BETAMETHASONE ACETATE 3; 3 MG/ML; MG/ML
12 INJECTION, SUSPENSION INTRA-ARTICULAR; INTRALESIONAL; INTRAMUSCULAR; SOFT TISSUE
Status: COMPLETED | OUTPATIENT
Start: 2023-10-27 | End: 2023-10-27

## 2023-10-27 RX ADMIN — LIDOCAINE HYDROCHLORIDE 5 ML: 10 INJECTION INFILTRATION; PERINEURAL at 10:25

## 2023-10-27 RX ADMIN — BETAMETHASONE SODIUM PHOSPHATE AND BETAMETHASONE ACETATE 12 MG: 3; 3 INJECTION, SUSPENSION INTRA-ARTICULAR; INTRALESIONAL; INTRAMUSCULAR; SOFT TISSUE at 10:25

## 2023-10-27 NOTE — PROGRESS NOTES
History: Sp is here today for both knees.  He had cortisone in the right knee back in March.  It has been bothering him more.  He still gets tingling around the lateral side of the left knee.  He recently got a lot of lab work done through Dr. Bacon and the only positive result was for Lyme disease.  His inflammatory markers and CBC were all normal.  He is here today as an established patient.    Past medical history: Multiple  Medications: Multiple  Allergies: No known drug allergies    Please refer to the intake H&P regarding the patient's review of systems, family history and social history as was done today    HEENT: Normal  Lungs: Clear to auscultation  Heart: RRR  Abdomen: Soft, nontender  Skin: clear  Extremity: This is a pleasant 73-year-old male in no apparent distress.  He has pain medially on the right knee.  There is a varus alignment.  1+ crepitus with range of motion slight laxity with varus stress.  Negative Lachman and posterior drawer.  Skin is clear.  He is neurovascular tact throughout.  On the left knee he has a well healed incision.  His range of motion is from 0 to 120 degrees.  Stable posterior drawer.  He has some numbness and tingling laterally to the wound as well as sensitivity to palpation laterally.  He is nervous intact distally  Contralateral exam is normal for strength, motion, stability and neurovascular assessment.    Radiographs: Previous x-rays of the right knee show bone-on-bone articulation medially with varus alignment.  Previous x-rays of the left knee show X alignment of his total knee arthroplasty with no evidence of any bony abnormality.    Assessment: 1.  End-stage right knee DJD   2.  Continued left knee pain status post total knee arthroplasty, 19 months out    Plan: He would like to do another injection into the right knee.  The left knee we discussed is likely more a nerve irritation that should continue to improve with time. His infection labs were all normal.      L Inj/Asp: R knee on 10/27/2023 10:25 AM  Indications: pain  Details: 22 G needle, lateral approach  Medications: 12 mg betamethasone acet,sod phos 6 mg/mL; 5 mL lidocaine 10 mg/mL (1 %)  Outcome: tolerated well, no immediate complications  Procedure, treatment alternatives, risks and benefits explained, specific risks discussed. Consent was given by the patient. Immediately prior to procedure a time out was called to verify the correct patient, procedure, equipment, support staff and site/side marked as required. Patient was prepped and draped in the usual sterile fashion.       He will ice as directed and follow up on an as-needed basis.  All questions were answered today with the patient.    This note was generated with voice recognition software and may contain grammatical errors.

## 2023-11-07 ENCOUNTER — HOSPITAL ENCOUNTER (OUTPATIENT)
Dept: OCCUPATIONAL THERAPY | Age: 73
Setting detail: THERAPIES SERIES
Discharge: HOME | End: 2023-11-07
Payer: MEDICARE

## 2023-11-07 PROCEDURE — 97530 THERAPEUTIC ACTIVITIES: CPT

## 2023-11-07 PROCEDURE — 97166 OT EVAL MOD COMPLEX 45 MIN: CPT

## 2023-11-07 ASSESSMENT — 9 HOLE PEG TEST
TESTTIME_SECONDS: 21.78
TEST_RESULT: FUNCTIONAL
TESTTIME_SECONDS: 20.65
TEST_RESULT: FUNCTIONAL

## 2023-11-07 ASSESSMENT — PAIN SCALES - GENERAL: PAINLEVEL_OUTOF10: 5

## 2023-11-07 NOTE — PROGRESS NOTES
Occupational Therapy: Evaluation Only   Patient: Deborah Contreras (25 y.o. male)   Examination Date:   Plan of Care Certification Period: 2023 to    Progress Note Counter: Pt is OT eval with d/c the same day   :  1950  MRN: [id number]  CSN: [id number]   Insurance: Payor: [employer] / Plan: [employer] ESSENTIAL/PLUS / Product Type: *No Product type* /   Insurance ID: [id number] - (Medicare Managed) Secondary Insurance (if applicable): Insurance Information:     Referring Physician: MD Dr Jessica Marie   PCP: Justyna Schreiber MD Visits to Date/Visits Approved:   1 / 10    No Show/Cancelled Appts:   /       Medical Diagnosis: Carpal tunnel syndrome, bilateral upper limbs [G56.03] Decreased ADL/IADL performance and QOL d/t B carpal tunnel  Treatment Diagnosis: Decreased QOL d/t pain from carpal tunnel syndrome B hands         PERTINENT MEDICAL HISTORY   Patient assessed for rehabilitation services?: Yes  Self reported health status[de-identified] Good    Medical History: Chart Reviewed: Yes   Past Medical History:   Diagnosis Date    COPD (chronic obstructive pulmonary disease) ([address])     Diabetes mellitus ([address])     Hyperlipidemia     Hypertension     Lung disease     Meningitis spinal      Surgical History:   Past Surgical History:   Procedure Laterality Date    COLONOSCOPY      HAND SURGERY Right     [place] COLON CA SCRN NOT HI RSK IND N/A 8/15/2018    COLONOSCOPY performed by Tiara Rosa MD at [address] [facility] 2019    SIGMOIDOSCOPY W/ DILATION performed by Tiara Rosa MD at [address] [address] Left 3/15/2022    LEFT TOTAL KNEE ARTHROPLASTY.  Hawa Valles PSI performed by Sheldon Kelly MD at [address]       Medications:   Current Outpatient Medications:     alogliptin (NESINA) 6.25 MG TABS tablet, Take 1 tablet by mouth daily, Disp: 120 tablet, Rfl: 0    memantine (NAMENDA) 10 MG tablet, Take 1 tablet by mouth daily, Disp:

## 2024-02-09 ENCOUNTER — APPOINTMENT (OUTPATIENT)
Dept: SLEEP MEDICINE | Facility: HOSPITAL | Age: 74
End: 2024-02-09
Payer: MEDICARE

## 2024-02-15 ENCOUNTER — HOSPITAL ENCOUNTER (OUTPATIENT)
Dept: LAB | Age: 74
Discharge: HOME | End: 2024-02-15
Payer: MEDICARE

## 2024-02-15 PROCEDURE — 82746 ASSAY OF FOLIC ACID SERUM: CPT

## 2024-02-15 PROCEDURE — 82607 VITAMIN B-12: CPT

## 2024-02-15 PROCEDURE — 83090 ASSAY OF HOMOCYSTEINE: CPT

## 2024-02-16 LAB
FOLATE: >20 NG/ML (ref 4.8–24.2)
HOMOCYSTEINE: 13.2 UMOL/L
VITAMIN B-12: 1440 PG/ML (ref 232–1245)

## 2024-05-20 ENCOUNTER — CLINICAL SUPPORT (OUTPATIENT)
Dept: SLEEP MEDICINE | Facility: HOSPITAL | Age: 74
End: 2024-05-20
Payer: MEDICARE

## 2024-05-20 VITALS — HEIGHT: 78 IN | WEIGHT: 229.28 LBS | BODY MASS INDEX: 26.53 KG/M2

## 2024-05-20 DIAGNOSIS — G47.30 SLEEP APNEA: ICD-10-CM

## 2024-05-20 DIAGNOSIS — G47.10 HYPERSOMNIA, UNSPECIFIED: ICD-10-CM

## 2024-05-20 PROCEDURE — 95810 POLYSOM 6/> YRS 4/> PARAM: CPT | Performed by: PSYCHIATRY & NEUROLOGY

## 2024-05-21 NOTE — PROGRESS NOTES
[facility] TECH NOTE:     Patient: Xiomara Veras   MRN//AGE: [id number]  1950  73 y.o.   Technologist: DON MONREAL   Room: 402   Service Date: 2024        Sleep Testing Location: [facility]    Woodman: 12    TECHNOLOGIST SLEEP STUDY PROCEDURE NOTE:   This sleep study is being conducted according to the policies and procedures outlined by the AAS accreditation standards.  The sleep study procedure and processes involved during this appointment was explained to the patient/patient’s family, questions were answered. The patient/family verbalized understanding.      The patient is a 73 y.o. year old male scheduled for a Diagnostic PSG Split night with montage of:  PSG . he arrived for his appointment.      The study that was ultimately completed was a Diagnostic PSG Split night with montage of:  PSG .    The full study Was completed.  Patient questionnaires completed?: yes     Consents signed? yes    Initial Fall Risk Screening:     Xiomara has not fallen in the last 6 months. his did not result in injury. Xiomara does not have a fear of falling. He does not need assistance with sitting, standing, or walking. he does not need assistance walking in his home. he does not need assistance in an unfamiliar setting. The patient is notusing an assistive device.     Brief Study observations:   XIOMARA VERAS  2024  MRN [id number]  Patient is a 73 year old male here today for a split night study of sleep with AHI >15 after >2 hours of sleep, per order for a lab protocol split. The hook-up was done in the room, and the purpose of all sensors was explained and all questions were answered. The importance of some supine sleep with minimal pillow elevation was emphasized. Also, the tech assured the patient that side sleep was acceptable as well. Biocals were completed. Normal sleep onset was observed. Split night was not completed due low AHI in the 1st part of the night, The patient was observed sleeping supine and right side  only. Baseline SaO2 was 97%, and SaO2 low was 83%.  History:  Patient has history of Bladder troubles, Bowel trouble, Diabetes, DJD, Wound drainage, Swelling of left lower extremity, COPD, asthma, HTN, stent, allergies, Difficulties Staying Asleep, Early Morning Awakenings, Excessive Daytime Sleepiness, snoring, Unrefreshing Sleep, Unusual Behaviors During Sleep At Night, Unusual Movements At Night, Sleep Apnea, fatigue.   Meds: Allopurinol, Clonazepam, Diclofenac, Gabapentin, Xopenex, Levothyroxine, Lovastatin, Meloxicam, Prednisone, Sulfamethoxazole and Trimethoprim, TraMADol.    Deviation to order/protocol and reason: Low AHI      If PAP, which was preferred mask/pressure/mode: N/A      Other:None    After the procedure, the patient/family was informed to ensure followup with ordering clinician for testing results.      Technologist: DON MONREAL

## 2024-06-04 PROBLEM — G62.9 POLYNEUROPATHY: Status: ACTIVE | Noted: 2023-08-30

## 2024-06-04 PROBLEM — R20.2 PARESTHESIA OF BOTH FEET: Status: ACTIVE | Noted: 2023-07-11

## 2024-06-04 PROBLEM — G43.009 MIGRAINE WITHOUT AURA AND RESPONSIVE TO TREATMENT: Status: ACTIVE | Noted: 2023-08-30

## 2024-06-04 PROBLEM — G56.03 BILATERAL CARPAL TUNNEL SYNDROME: Status: ACTIVE | Noted: 2023-11-29

## 2024-06-04 PROBLEM — E53.8 B12 DEFICIENCY: Status: ACTIVE | Noted: 2023-11-29

## 2024-06-04 PROBLEM — M62.838 SPASM OF CERVICAL PARASPINOUS MUSCLE: Status: ACTIVE | Noted: 2023-08-30

## 2024-06-04 PROBLEM — H57.04 TRAUMATIC MYDRIASIS: Status: ACTIVE | Noted: 2023-08-30

## 2024-06-12 ENCOUNTER — HOSPITAL ENCOUNTER (OUTPATIENT)
Dept: LAB | Age: 74
Discharge: HOME | End: 2024-06-12
Payer: MEDICARE

## 2024-06-12 LAB
ALBUMIN SERPL-MCNC: 3.7 G/DL (ref 3.5–4.6)
ALP SERPL-CCNC: 87 U/L (ref 35–104)
ALT SERPL-CCNC: 13 U/L (ref 0–41)
ANION GAP SERPL CALCULATED.3IONS-SCNC: 10 MEQ/L (ref 9–15)
AST SERPL-CCNC: 20 U/L (ref 0–40)
BILIRUB SERPL-MCNC: 0.4 MG/DL (ref 0.2–0.7)
BUN SERPL-MCNC: 10 MG/DL (ref 8–23)
CALCIUM SERPL-MCNC: 9.9 MG/DL (ref 8.5–9.9)
CHLORIDE SERPL-SCNC: 103 MEQ/L (ref 95–107)
CHOLEST SERPL-MCNC: 129 MG/DL (ref 0–199)
CO2 SERPL-SCNC: 28 MEQ/L (ref 20–31)
CREAT SERPL-MCNC: 1.56 MG/DL (ref 0.7–1.2)
GLOBULIN SER CALC-MCNC: 3.2 G/DL (ref 2.3–3.5)
GLUCOSE SERPL-MCNC: 113 MG/DL (ref 70–99)
HDLC SERPL-MCNC: 36 MG/DL (ref 40–59)
LDLC SERPL CALC-MCNC: 63 MG/DL (ref 0–129)
MAGNESIUM SERPL-MCNC: 2 MG/DL (ref 1.7–2.4)
POTASSIUM SERPL-SCNC: 4.2 MEQ/L (ref 3.4–4.9)
PROT SERPL-MCNC: 6.9 G/DL (ref 6.3–8)
SODIUM SERPL-SCNC: 141 MEQ/L (ref 135–144)
TRIGL SERPL-MCNC: 150 MG/DL (ref 0–150)
TSH SERPL-MCNC: 1.59 UIU/ML (ref 0.44–3.86)
URATE SERPL-MCNC: 4.1 MG/DL (ref 3.4–7)

## 2024-06-12 PROCEDURE — 84443 ASSAY THYROID STIM HORMONE: CPT

## 2024-06-12 PROCEDURE — 80061 LIPID PANEL: CPT

## 2024-06-12 PROCEDURE — 84550 ASSAY OF BLOOD/URIC ACID: CPT

## 2024-06-12 PROCEDURE — 83735 ASSAY OF MAGNESIUM: CPT

## 2024-06-12 PROCEDURE — 80053 COMPREHEN METABOLIC PANEL: CPT

## 2024-06-12 PROCEDURE — 36415 COLL VENOUS BLD VENIPUNCTURE: CPT

## 2024-10-08 RX ORDER — POLYETHYLENE GLYCOL 3350, SODIUM CHLORIDE, SODIUM BICARBONATE, POTASSIUM CHLORIDE 420; 11.2; 5.72; 1.48 G/4L; G/4L; G/4L; G/4L
4000 POWDER, FOR SOLUTION ORAL ONCE
Qty: 4000 ML | Refills: 0 | Status: SHIPPED | OUTPATIENT
Start: 2024-10-08 | End: 2024-10-08

## 2024-10-17 ENCOUNTER — PREP FOR PROCEDURE (OUTPATIENT)
Dept: GASTROENTEROLOGY | Age: 74
End: 2024-10-17

## 2024-10-17 RX ORDER — SODIUM CHLORIDE 0.9 % (FLUSH) 0.9 %
5-40 SYRINGE (ML) INJECTION EVERY 12 HOURS SCHEDULED
Status: CANCELLED | OUTPATIENT
Start: 2024-10-23

## 2024-10-17 RX ORDER — SODIUM CHLORIDE 9 MG/ML
INJECTION, SOLUTION INTRAVENOUS PRN
Status: CANCELLED | OUTPATIENT
Start: 2024-10-23

## 2024-10-17 RX ORDER — SODIUM CHLORIDE 0.9 % (FLUSH) 0.9 %
5-40 SYRINGE (ML) INJECTION PRN
Status: CANCELLED | OUTPATIENT
Start: 2024-10-23

## 2024-10-17 RX ORDER — SODIUM CHLORIDE 9 MG/ML
INJECTION, SOLUTION INTRAVENOUS CONTINUOUS
Status: CANCELLED | OUTPATIENT
Start: 2024-10-23

## 2024-10-18 ENCOUNTER — OFFICE VISIT (OUTPATIENT)
Dept: ORTHOPEDIC SURGERY | Facility: CLINIC | Age: 74
End: 2024-10-18
Payer: MEDICARE

## 2024-10-18 ENCOUNTER — ANCILLARY PROCEDURE (OUTPATIENT)
Dept: ORTHOPEDIC SURGERY | Facility: CLINIC | Age: 74
End: 2024-10-18
Payer: MEDICARE

## 2024-10-18 DIAGNOSIS — Z96.652 S/P TOTAL KNEE ARTHROPLASTY, LEFT: Primary | ICD-10-CM

## 2024-10-18 DIAGNOSIS — M25.562 PAIN IN BOTH KNEES, UNSPECIFIED CHRONICITY: ICD-10-CM

## 2024-10-18 DIAGNOSIS — M17.11 PRIMARY OSTEOARTHRITIS OF RIGHT KNEE: ICD-10-CM

## 2024-10-18 DIAGNOSIS — M25.561 PAIN IN BOTH KNEES, UNSPECIFIED CHRONICITY: ICD-10-CM

## 2024-10-18 PROCEDURE — 99214 OFFICE O/P EST MOD 30 MIN: CPT | Performed by: ORTHOPAEDIC SURGERY

## 2024-10-18 PROCEDURE — 1159F MED LIST DOCD IN RCRD: CPT | Performed by: ORTHOPAEDIC SURGERY

## 2024-10-18 PROCEDURE — 20610 DRAIN/INJ JOINT/BURSA W/O US: CPT | Performed by: ORTHOPAEDIC SURGERY

## 2024-10-18 RX ORDER — BETAMETHASONE SODIUM PHOSPHATE AND BETAMETHASONE ACETATE 3; 3 MG/ML; MG/ML
12 INJECTION, SUSPENSION INTRA-ARTICULAR; INTRALESIONAL; INTRAMUSCULAR; SOFT TISSUE
Status: COMPLETED | OUTPATIENT
Start: 2024-10-18 | End: 2024-10-18

## 2024-10-18 RX ORDER — LIDOCAINE HYDROCHLORIDE 10 MG/ML
5 INJECTION, SOLUTION INFILTRATION; PERINEURAL
Status: COMPLETED | OUTPATIENT
Start: 2024-10-18 | End: 2024-10-18

## 2024-10-18 NOTE — PROGRESS NOTES
History: Sp is here for his knees.  He is nearly a year from his left knee arthroplasty and has done well with some occasional soreness.  His right knee continues to bother him.  He did a cortisone injection last year which helped.    Past medical history: Multiple  Medications: Multiple  Allergies: No known drug allergies    Please refer to the intake H&P regarding the patient's review of systems, family history and social history as was done today    HEENT: Normal  Lungs: Clear to auscultation  Heart: RRR  Abdomen: Soft, nontender  Skin: clear  Extremity: Left knee incision is healed well.  No swelling redness or warmth whatsoever.  He has excellent range of motion with full extension.  He has some tenderness medially and laterally.  No numbness.  Right knee shows obvious varus alignment with pain medially.  1+ crepitus with range of motion.  Negative Lachman.  Contralateral exam is normal for strength, motion, stability and neurovascular assessment.    Radiographs: X-rays today show end-stage right knee DJD with varus alignment.  Left knee shows a stable appearing arthroplasty.  No loosening or other abnormality.    Assessment: End-stage right knee DJD, stable left total knee arthroplasty 1 year out with some persistent medial and lateral pain    Plan: His left knee arthroplasty looks excellent and is wound is well-healed.  There is no swelling redness or warmth whatsoever.  He may be putting excess stress on the left side as his right knee is quite worn.  He is contemplating right knee arthroplasty next year.  He would like to do another injection as it does give him decent relief.  We will see him back for either side over the next few months.    L Inj/Asp: R knee on 10/18/2024 11:25 AM  Indications: pain  Details: 22 G needle, lateral approach  Medications: 5 mL lidocaine 10 mg/mL (1 %); 12 mg betamethasone acet,sod phos 6 mg/mL  Outcome: tolerated well, no immediate complications  Procedure,  treatment alternatives, risks and benefits explained, specific risks discussed. Consent was given by the patient. Immediately prior to procedure a time out was called to verify the correct patient, procedure, equipment, support staff and site/side marked as required. Patient was prepped and draped in the usual sterile fashion.          All questions were answered today with the patient.    This note was generated with voice recognition software and may contain grammatical errors.

## 2024-10-23 ENCOUNTER — ANESTHESIA (OUTPATIENT)
Dept: OPERATING ROOM | Age: 74
End: 2024-10-23
Payer: MEDICARE

## 2024-10-23 ENCOUNTER — HOSPITAL ENCOUNTER (OUTPATIENT)
Age: 74
Setting detail: OUTPATIENT SURGERY
Discharge: HOME | End: 2024-10-23
Attending: SPECIALIST | Admitting: SPECIALIST
Payer: MEDICARE

## 2024-10-23 ENCOUNTER — ANESTHESIA EVENT (OUTPATIENT)
Dept: ENDOSCOPY | Age: 74
End: 2024-10-23
Payer: MEDICARE

## 2024-10-23 ENCOUNTER — ANESTHESIA EVENT (OUTPATIENT)
Dept: OPERATING ROOM | Age: 74
End: 2024-10-23
Payer: MEDICARE

## 2024-10-23 VITALS
RESPIRATION RATE: 16 BRPM | OXYGEN SATURATION: 94 % | SYSTOLIC BLOOD PRESSURE: 119 MMHG | BODY MASS INDEX: 26.38 KG/M2 | HEIGHT: 78 IN | TEMPERATURE: 96.8 F | HEART RATE: 71 BPM | WEIGHT: 228 LBS | DIASTOLIC BLOOD PRESSURE: 75 MMHG

## 2024-10-23 PROBLEM — Z86.0100 HISTORY OF COLON POLYPS: Status: ACTIVE | Noted: 2024-10-23

## 2024-10-23 LAB
GLUCOSE BLD-MCNC: 102 MG/DL (ref 70–99)
PERFORMED ON: ABNORMAL

## 2024-10-23 PROCEDURE — 6360000002 HC RX W HCPCS: Performed by: NURSE ANESTHETIST, CERTIFIED REGISTERED

## 2024-10-23 PROCEDURE — 2709999900 HC NON-CHARGEABLE SUPPLY: Performed by: SPECIALIST

## 2024-10-23 PROCEDURE — 2500000003 HC RX 250 WO HCPCS: Performed by: NURSE ANESTHETIST, CERTIFIED REGISTERED

## 2024-10-23 PROCEDURE — 2580000003 HC RX 258: Performed by: SPECIALIST

## 2024-10-23 PROCEDURE — 3700000000 HC ANESTHESIA ATTENDED CARE: Performed by: SPECIALIST

## 2024-10-23 PROCEDURE — 7100000010 HC PHASE II RECOVERY - FIRST 15 MIN: Performed by: SPECIALIST

## 2024-10-23 PROCEDURE — 3609027000 HC COLONOSCOPY: Performed by: SPECIALIST

## 2024-10-23 PROCEDURE — 7100000011 HC PHASE II RECOVERY - ADDTL 15 MIN: Performed by: SPECIALIST

## 2024-10-23 PROCEDURE — G0104 CA SCREEN;FLEXI SIGMOIDSCOPE: HCPCS | Performed by: SPECIALIST

## 2024-10-23 RX ORDER — NYSTATIN 100000 [USP'U]/G
1 POWDER TOPICAL 2 TIMES DAILY
COMMUNITY
Start: 2023-12-11 | End: 2024-12-10

## 2024-10-23 RX ORDER — SODIUM CHLORIDE 9 MG/ML
INJECTION, SOLUTION INTRAVENOUS PRN
Status: DISCONTINUED | OUTPATIENT
Start: 2024-10-23 | End: 2024-10-23 | Stop reason: HOSPADM

## 2024-10-23 RX ORDER — BRIMONIDINE TARTRATE 2 MG/ML
1 SOLUTION/ DROPS OPHTHALMIC 2 TIMES DAILY
COMMUNITY
Start: 2024-09-23

## 2024-10-23 RX ORDER — DORZOLAMIDE HCL 20 MG/ML
1 SOLUTION/ DROPS OPHTHALMIC 2 TIMES DAILY
COMMUNITY
Start: 2024-07-25

## 2024-10-23 RX ORDER — METFORMIN HYDROCHLORIDE 500 MG/1
500 TABLET, EXTENDED RELEASE ORAL
COMMUNITY
Start: 2024-09-26

## 2024-10-23 RX ORDER — POLYETHYLENE GLYCOL 3350, SODIUM CHLORIDE, SODIUM BICARBONATE, POTASSIUM CHLORIDE 420; 11.2; 5.72; 1.48 G/4L; G/4L; G/4L; G/4L
4000 POWDER, FOR SOLUTION ORAL ONCE
Qty: 4000 ML | Refills: 0 | Status: SHIPPED | OUTPATIENT
Start: 2024-10-23 | End: 2024-10-23

## 2024-10-23 RX ORDER — LISINOPRIL 20 MG/1
20 TABLET ORAL DAILY
COMMUNITY
Start: 2024-09-18

## 2024-10-23 RX ORDER — ALBUTEROL SULFATE 90 UG/1
1 INHALANT RESPIRATORY (INHALATION) EVERY 4 HOURS
COMMUNITY

## 2024-10-23 RX ORDER — GABAPENTIN 300 MG/1
300 CAPSULE ORAL DAILY
COMMUNITY

## 2024-10-23 RX ORDER — SODIUM CHLORIDE, SODIUM LACTATE, POTASSIUM CHLORIDE, CALCIUM CHLORIDE 600; 310; 30; 20 MG/100ML; MG/100ML; MG/100ML; MG/100ML
INJECTION, SOLUTION INTRAVENOUS
Status: COMPLETED
Start: 2024-10-23 | End: 2024-10-23

## 2024-10-23 RX ORDER — LIDOCAINE HYDROCHLORIDE 10 MG/ML
INJECTION, SOLUTION INFILTRATION; PERINEURAL
Status: DISCONTINUED | OUTPATIENT
Start: 2024-10-23 | End: 2024-10-23 | Stop reason: SDUPTHER

## 2024-10-23 RX ORDER — PROPOFOL 10 MG/ML
INJECTION, EMULSION INTRAVENOUS
Status: DISCONTINUED | OUTPATIENT
Start: 2024-10-23 | End: 2024-10-23 | Stop reason: SDUPTHER

## 2024-10-23 RX ORDER — POLYETHYLENE GLYCOL 3350, SODIUM CHLORIDE, SODIUM BICARBONATE, POTASSIUM CHLORIDE 420; 11.2; 5.72; 1.48 G/4L; G/4L; G/4L; G/4L
4000 POWDER, FOR SOLUTION ORAL ONCE
Qty: 4000 ML | Refills: 0 | Status: CANCELLED | OUTPATIENT
Start: 2024-10-23 | End: 2024-10-23

## 2024-10-23 RX ORDER — SODIUM CHLORIDE 0.9 % (FLUSH) 0.9 %
5-40 SYRINGE (ML) INJECTION PRN
Status: DISCONTINUED | OUTPATIENT
Start: 2024-10-23 | End: 2024-10-23 | Stop reason: HOSPADM

## 2024-10-23 RX ORDER — SODIUM CHLORIDE, SODIUM LACTATE, POTASSIUM CHLORIDE, CALCIUM CHLORIDE 600; 310; 30; 20 MG/100ML; MG/100ML; MG/100ML; MG/100ML
INJECTION, SOLUTION INTRAVENOUS CONTINUOUS
Status: DISCONTINUED | OUTPATIENT
Start: 2024-10-23 | End: 2024-10-23 | Stop reason: HOSPADM

## 2024-10-23 RX ORDER — DONEPEZIL HYDROCHLORIDE 10 MG/1
10 TABLET, FILM COATED ORAL 2 TIMES DAILY
COMMUNITY
Start: 2024-08-04

## 2024-10-23 RX ORDER — PREDNISONE 10 MG/1
10 TABLET ORAL DAILY
COMMUNITY
Start: 2024-10-03

## 2024-10-23 RX ORDER — SODIUM CHLORIDE 0.9 % (FLUSH) 0.9 %
5-40 SYRINGE (ML) INJECTION EVERY 12 HOURS SCHEDULED
Status: DISCONTINUED | OUTPATIENT
Start: 2024-10-23 | End: 2024-10-23 | Stop reason: HOSPADM

## 2024-10-23 RX ORDER — ALLOPURINOL 300 MG/1
300 TABLET ORAL DAILY
COMMUNITY

## 2024-10-23 RX ORDER — AMLODIPINE BESYLATE 2.5 MG/1
2.5 TABLET ORAL DAILY
COMMUNITY
Start: 2024-09-24 | End: 2025-09-24

## 2024-10-23 RX ORDER — LATANOPROST 50 UG/ML
1 SOLUTION/ DROPS OPHTHALMIC DAILY
COMMUNITY

## 2024-10-23 RX ORDER — SODIUM CHLORIDE 9 MG/ML
INJECTION, SOLUTION INTRAVENOUS CONTINUOUS
Status: DISCONTINUED | OUTPATIENT
Start: 2024-10-23 | End: 2024-10-23 | Stop reason: HOSPADM

## 2024-10-23 RX ADMIN — PROPOFOL 25 MG: 10 INJECTION, EMULSION INTRAVENOUS at 11:36

## 2024-10-23 RX ADMIN — LIDOCAINE HYDROCHLORIDE 50 MG: 10 INJECTION, SOLUTION INFILTRATION; PERINEURAL at 11:36

## 2024-10-23 RX ADMIN — PROPOFOL 50 MG: 10 INJECTION, EMULSION INTRAVENOUS at 11:35

## 2024-10-23 RX ADMIN — SODIUM CHLORIDE, POTASSIUM CHLORIDE, SODIUM LACTATE AND CALCIUM CHLORIDE: 600; 310; 30; 20 INJECTION, SOLUTION INTRAVENOUS at 10:40

## 2024-10-23 ASSESSMENT — COPD QUESTIONNAIRES: CAT_SEVERITY: NO INTERVAL CHANGE

## 2024-10-23 ASSESSMENT — PAIN - FUNCTIONAL ASSESSMENT: PAIN_FUNCTIONAL_ASSESSMENT: 0-10

## 2024-10-23 NOTE — PROGRESS NOTES
Patient ID:  Holden Greenfield  [id number]  74 y.o.  1950  Patient received in PACU BED 2 Report received from Anesthesia and Surgical Nurse.   Attached to Monitor, VSS, See Nursing Assessment and Flowsheets for detailed Information  Awake, following commands, answering questions appropriately. O2 Sats>92 on Room air   VSS.  IV Discontinued per protocol, catheter intact, patient tolerated well.  Discharge Instructions given, patient verbalizes and understanding.  Pt to be discharged to home with responsible adult     Electronically signed by Briseida Segovia RN on 10/23/24

## 2024-10-23 NOTE — PROGRESS NOTES
Patient with poor prep. Scheduled tomorrow to arrive at noon in [place], colonoscopy scheduled for 1 pm. Patient instructed not to eat any solid food, avoid red dyes, and consume only liquids. Patient educated on picking up new prescription and drinking half at 7pm, and half 5am.

## 2024-10-23 NOTE — ANESTHESIA POSTPROCEDURE EVALUATION
Department of Anesthesiology  Postprocedure Note    Patient: Holden Greenfield  MRN: [id number]  YOB: 1950  Date of evaluation: 10/23/2024    Procedure Summary       Date: 10/23/24 Room / Location: [address]    Anesthesia Start: 1133 Anesthesia Stop: 1142    Procedure: COLORECTAL CANCER SCREENING, HIGH RISK Diagnosis:       History of colon polyps      Family history of colon cancer      (History of colon polyps [Z86.010])      (Family history of colon cancer [Z80.0])    Surgeons: Diogo Anderson MD Responsible Provider: Megha Cleaning APRN - CRNA    Anesthesia Type: MAC ASA Status: 2            Anesthesia Type: MAC    Colin Phase I: Colin Score: 10    Colin Phase II:      Anesthesia Post Evaluation    Patient location during evaluation: bedside  Patient participation: complete - patient participated  Level of consciousness: awake and awake and alert  Airway patency: patent  Nausea & Vomiting: no nausea and no vomiting  Cardiovascular status: blood pressure returned to baseline and hemodynamically stable  Respiratory status: acceptable  Hydration status: euvolemic  Pain management: adequate        No notable events documented.  
Breathing spontaneous and unlabored. Breath sounds clear and equal bilaterally with regular rhythm.

## 2024-10-23 NOTE — H&P
Patient Name: Holden Greenfield  : 1950  MRN: [id number]  DATE: 10/23/24      ENDOSCOPY  History and Physical    Procedure:    [x] Diagnostic Colonoscopy       [] Screening Colonoscopy  [] EGD      [] ERCP      [] EUS       [] Other    [x] Previous office notes/History and Physical reviewed from the patients chart. Please see EMR for further details of HPI. I have examined the patient's status immediately prior to the procedure and:      Indications/HPI:    []Abdominal Pain  []Cancer- GI/Lung  []Fhx of colon CA/polyps  []History of Polyps  []Briceno’s   []Melena  []Abnormal Imaging  []Dysphagia    []Persistent Pneumonia  []Anemia  []Food Impaction  []History of Polyps  []GI Bleed  []Pulmonary nodule/Mass  []Change in bowel habits []Heartburn/Reflux  []Rectal Bleed (BRBPR)  []Chest Pain - Non Cardiac []Heme (+) Stoo  l[]Ulcers  []Constipation  []Hemoptysis   []Varices  []Diarrhea  []Hypoxemia  []Nausea/Vomiting  []Screening   []Crohns/Colitis  []Other: History of colon polyp    Anesthesia:   [x] MAC [] Moderate Sedation   [] General   [] None     ROS: 12 pt Review of Symptoms was negative unless mentioned above    Medications:   Prior to Admission medications    Medication Sig Start Date End Date Taking? Authorizing Provider   allopurinol (ZYLOPRIM) 300 MG tablet Take 1 tablet by mouth daily   Yes ProviderBeltran MD   gabapentin (NEURONTIN) 300 MG capsule Take 1 capsule by mouth daily.   Yes Provider, MD eBltran   amLODIPine (NORVASC) 2.5 MG tablet Take 1 tablet by mouth daily 24 Yes ProviderBeltran MD   brimonidine (ALPHAGAN) 0.2 % ophthalmic solution Place 1 drop into both eyes 2 times daily 24  Yes Provider, MD Beltran   albuterol sulfate HFA (PROVENTIL;VENTOLIN;PROAIR) 108 (90 Base) MCG/ACT inhaler Inhale 1 puff into the lungs every 4 hours   Yes ProviderBeltran MD   donepezil (ARICEPT) 10 MG tablet Take 1 tablet by mouth 2 times daily 24  Yes Provider,

## 2024-10-23 NOTE — ANESTHESIA PRE PROCEDURE
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 OR    SIGMOIDOSCOPY N/A 2019    SIGMOIDOSCOPY W/ DILATION performed by Diogo Anderson MD at [facility]    THYROIDECTOMY      TOTAL KNEE ARTHROPLASTY Left 3/15/2022    LEFT TOTAL KNEE ARTHROPLASTY. SUPINE, MU PSI performed by David Hu MD at [facility] OR       Social History:    Social History     Tobacco Use    Smoking status: Former     Current packs/day: 0.00     Types: Cigarettes     Start date: 1968     Quit date: 2000     Years since quittin.3    Smokeless tobacco: Never   Substance Use Topics    Alcohol use: No     Alcohol/week: 0.0 standard drinks of alcohol                                Counseling given: Not Answered      Vital Signs (Current):   Vitals:    10/23/24 1023   BP: 137/69   Pulse: 70   Resp: 16   Temp: 36 °C (96.8 °F)   TempSrc: Temporal   SpO2: 96%   Weight: 103.4 kg (228 lb)   Height: 1.981 m (6' 6\")                                              BP Readings from Last 3 Encounters:   10/23/24 137/69   07/10/23 133/69   23 120/74       NPO Status: Time of last liquid consumption:                         Time of last solid consumption:                         Date of last liquid consumption: 10/22/24                        Date of last solid food consumption: 10/21/24    BMI:   Wt Readings from Last 3 Encounters:   10/23/24 103.4 kg (228 lb)   23 97.6 kg (215 lb 1.6 oz)   23 104.3 kg (230 lb)     Body mass index is 26.35 kg/m².    CBC:   Lab Results   Component Value Date/Time    WBC 7.8 2023

## 2024-10-24 ENCOUNTER — ANESTHESIA (OUTPATIENT)
Dept: ENDOSCOPY | Age: 74
End: 2024-10-24
Payer: MEDICARE

## 2024-10-24 ENCOUNTER — HOSPITAL ENCOUNTER (OUTPATIENT)
Age: 74
Setting detail: OUTPATIENT SURGERY
Discharge: HOME | End: 2024-10-24
Attending: SPECIALIST | Admitting: SPECIALIST
Payer: MEDICARE

## 2024-10-24 VITALS
OXYGEN SATURATION: 95 % | DIASTOLIC BLOOD PRESSURE: 64 MMHG | HEIGHT: 78 IN | SYSTOLIC BLOOD PRESSURE: 107 MMHG | WEIGHT: 230 LBS | BODY MASS INDEX: 26.61 KG/M2 | HEART RATE: 71 BPM | RESPIRATION RATE: 16 BRPM | TEMPERATURE: 97 F

## 2024-10-24 DIAGNOSIS — Z12.11 COLON CANCER SCREENING: ICD-10-CM

## 2024-10-24 LAB
GLUCOSE BLD-MCNC: 81 MG/DL (ref 70–99)
PERFORMED ON: NORMAL

## 2024-10-24 PROCEDURE — 3700000000 HC ANESTHESIA ATTENDED CARE: Performed by: SPECIALIST

## 2024-10-24 PROCEDURE — 2709999900 HC NON-CHARGEABLE SUPPLY: Performed by: SPECIALIST

## 2024-10-24 PROCEDURE — 6360000002 HC RX W HCPCS

## 2024-10-24 PROCEDURE — 2580000003 HC RX 258: Performed by: SPECIALIST

## 2024-10-24 PROCEDURE — 7100000010 HC PHASE II RECOVERY - FIRST 15 MIN: Performed by: SPECIALIST

## 2024-10-24 PROCEDURE — 7100000011 HC PHASE II RECOVERY - ADDTL 15 MIN: Performed by: SPECIALIST

## 2024-10-24 PROCEDURE — 88305 TISSUE EXAM BY PATHOLOGIST: CPT

## 2024-10-24 PROCEDURE — 45385 COLONOSCOPY W/LESION REMOVAL: CPT | Performed by: SPECIALIST

## 2024-10-24 PROCEDURE — 2500000003 HC RX 250 WO HCPCS

## 2024-10-24 PROCEDURE — 3700000001 HC ADD 15 MINUTES (ANESTHESIA): Performed by: SPECIALIST

## 2024-10-24 PROCEDURE — 3609027000 HC COLONOSCOPY: Performed by: SPECIALIST

## 2024-10-24 RX ORDER — SODIUM CHLORIDE 0.9 % (FLUSH) 0.9 %
5-40 SYRINGE (ML) INJECTION PRN
Status: DISCONTINUED | OUTPATIENT
Start: 2024-10-24 | End: 2024-10-24 | Stop reason: HOSPADM

## 2024-10-24 RX ORDER — SODIUM CHLORIDE 0.9 % (FLUSH) 0.9 %
5-40 SYRINGE (ML) INJECTION EVERY 12 HOURS SCHEDULED
Status: DISCONTINUED | OUTPATIENT
Start: 2024-10-24 | End: 2024-10-24 | Stop reason: HOSPADM

## 2024-10-24 RX ORDER — SODIUM CHLORIDE 9 MG/ML
INJECTION, SOLUTION INTRAVENOUS CONTINUOUS
Status: CANCELLED | OUTPATIENT
Start: 2024-10-24

## 2024-10-24 RX ORDER — SODIUM CHLORIDE 0.9 % (FLUSH) 0.9 %
5-40 SYRINGE (ML) INJECTION PRN
Status: CANCELLED | OUTPATIENT
Start: 2024-10-24

## 2024-10-24 RX ORDER — SODIUM CHLORIDE 9 MG/ML
INJECTION, SOLUTION INTRAVENOUS PRN
Status: DISCONTINUED | OUTPATIENT
Start: 2024-10-24 | End: 2024-10-24 | Stop reason: HOSPADM

## 2024-10-24 RX ORDER — PROPOFOL 10 MG/ML
INJECTION, EMULSION INTRAVENOUS
Status: DISCONTINUED | OUTPATIENT
Start: 2024-10-24 | End: 2024-10-24 | Stop reason: SDUPTHER

## 2024-10-24 RX ORDER — SODIUM CHLORIDE 0.9 % (FLUSH) 0.9 %
5-40 SYRINGE (ML) INJECTION EVERY 12 HOURS SCHEDULED
Status: CANCELLED | OUTPATIENT
Start: 2024-10-24

## 2024-10-24 RX ORDER — SODIUM CHLORIDE 9 MG/ML
INJECTION, SOLUTION INTRAVENOUS PRN
Status: CANCELLED | OUTPATIENT
Start: 2024-10-24

## 2024-10-24 RX ORDER — LIDOCAINE HYDROCHLORIDE 20 MG/ML
INJECTION, SOLUTION INFILTRATION; PERINEURAL
Status: DISCONTINUED | OUTPATIENT
Start: 2024-10-24 | End: 2024-10-24 | Stop reason: SDUPTHER

## 2024-10-24 RX ORDER — SODIUM CHLORIDE 9 MG/ML
INJECTION, SOLUTION INTRAVENOUS CONTINUOUS
Status: DISCONTINUED | OUTPATIENT
Start: 2024-10-24 | End: 2024-10-24 | Stop reason: HOSPADM

## 2024-10-24 RX ORDER — AMMONIUM LACTATE 12 G/100G
LOTION TOPICAL PRN
COMMUNITY

## 2024-10-24 RX ADMIN — PROPOFOL 50 MG: 10 INJECTION, EMULSION INTRAVENOUS at 13:00

## 2024-10-24 RX ADMIN — PROPOFOL 100 MG: 10 INJECTION, EMULSION INTRAVENOUS at 12:49

## 2024-10-24 RX ADMIN — LIDOCAINE HYDROCHLORIDE 3 ML: 20 INJECTION, SOLUTION INFILTRATION; PERINEURAL at 12:49

## 2024-10-24 RX ADMIN — PROPOFOL 50 MG: 10 INJECTION, EMULSION INTRAVENOUS at 12:54

## 2024-10-24 RX ADMIN — PROPOFOL 50 MG: 10 INJECTION, EMULSION INTRAVENOUS at 12:52

## 2024-10-24 ASSESSMENT — PAIN - FUNCTIONAL ASSESSMENT
PAIN_FUNCTIONAL_ASSESSMENT: 0-10
PAIN_FUNCTIONAL_ASSESSMENT: 0-10

## 2024-10-24 NOTE — H&P
Patient Name: Holden Greenfield  : 1950  MRN: [id number]  DATE: 10/24/24      ENDOSCOPY  History and Physical    Procedure:    [x] Diagnostic Colonoscopy       [] Screening Colonoscopy  [] EGD      [] ERCP      [] EUS       [] Other    [x] Previous office notes/History and Physical reviewed from the patients chart. Please see EMR for further details of HPI. I have examined the patient's status immediately prior to the procedure and:      Indications/HPI:    []Abdominal Pain  []Cancer- GI/Lung  []Fhx of colon CA/polyps  []History of Polyps  []Briceno’s   []Melena  []Abnormal Imaging  []Dysphagia    []Persistent Pneumonia  []Anemia  []Food Impaction  []History of Polyps  []GI Bleed  []Pulmonary nodule/Mass  []Change in bowel habits []Heartburn/Reflux  []Rectal Bleed (BRBPR)  []Chest Pain - Non Cardiac []Heme (+) Stoo  l[]Ulcers  []Constipation  []Hemoptysis   []Varices  []Diarrhea  []Hypoxemia  []Nausea/Vomiting  []Screening   []Crohns/Colitis  []Other: Constipation and history of colon polyps    Anesthesia:   [x] MAC [] Moderate Sedation   [] General   [] None     ROS: 12 pt Review of Symptoms was negative unless mentioned above    Medications:   Prior to Admission medications    Medication Sig Start Date End Date Taking? Authorizing Provider   ammonium lactate (LAC-HYDRIN) 12 % lotion Apply topically as needed for Dry Skin Apply topically as needed.   Yes Provider, MD Beltran   allopurinol (ZYLOPRIM) 300 MG tablet Take 1 tablet by mouth daily   Yes Provider, MD Beltran   gabapentin (NEURONTIN) 300 MG capsule Take 1 capsule by mouth daily.   Yes Provider, MD Beltran   amLODIPine (NORVASC) 2.5 MG tablet Take 1 tablet by mouth daily 24 Yes ProviderBeltran MD   brimonidine (ALPHAGAN) 0.2 % ophthalmic solution Place 1 drop into both eyes 2 times daily 24  Yes ProviderBeltran MD   albuterol sulfate HFA (PROVENTIL;VENTOLIN;PROAIR) 108 (90 Base) MCG/ACT inhaler Inhale 1 puff

## 2024-10-24 NOTE — ANESTHESIA POSTPROCEDURE EVALUATION
Department of Anesthesiology  Postprocedure Note    Patient: Holden Greenfield  MRN: [id number]  YOB: 1950  Date of evaluation: 10/24/2024    Procedure Summary       Date: 10/24/24 Room / Location: [facility] OR 01 / [facility]    Anesthesia Start: 1245 Anesthesia Stop: 1307    Procedure: COLORECTAL CANCER SCREENING, NOT HIGH RISK WITH POLYPECTOMY Diagnosis:       Colon cancer screening      (Colon cancer screening [Z12.11])    Surgeons: Diogo Anderson MD Responsible Provider: Evita William APRN - CRNA    Anesthesia Type: MAC ASA Status: 3            Anesthesia Type: No value filed.    Colin Phase I: Colin Score: 10    Colin Phase II:      Anesthesia Post Evaluation    Patient location during evaluation: bedside  Patient participation: complete - patient participated  Level of consciousness: awake and awake and alert  Airway patency: patent  Nausea & Vomiting: no nausea and no vomiting  Cardiovascular status: blood pressure returned to baseline and hemodynamically stable  Respiratory status: acceptable  Hydration status: euvolemic  Pain management: adequate        No notable events documented.

## 2024-10-24 NOTE — ANESTHESIA PRE PROCEDURE
oz)     Body mass index is 26.58 kg/m².    CBC:   Lab Results   Component Value Date/Time    WBC 7.8 07/08/2023 05:31 AM    RBC 4.41 07/08/2023 05:31 AM    HGB 12.5 07/08/2023 05:31 AM    HCT 37.3 07/08/2023 05:31 AM    MCV 84.6 07/08/2023 05:31 AM    RDW 15.2 07/08/2023 05:31 AM     07/08/2023 05:31 AM       CMP:   Lab Results   Component Value Date/Time     06/12/2024 09:02 AM    K 4.2 06/12/2024 09:02 AM    K 3.6 07/08/2023 05:30 AM     06/12/2024 09:02 AM    CO2 28 06/12/2024 09:02 AM    BUN 10 06/12/2024 09:02 AM    CREATININE 1.56 06/12/2024 09:02 AM    GFRAA 53.8 03/16/2022 05:48 AM    LABGLOM 46.7 06/12/2024 09:02 AM    LABGLOM 52.5 07/10/2023 05:48 AM    GLUCOSE 113 06/12/2024 09:02 AM    GLUCOSE 79 05/09/2012 08:24 AM    CALCIUM 9.9 06/12/2024 09:02 AM    BILITOT 0.4 06/12/2024 09:02 AM    ALKPHOS 87 06/12/2024 09:02 AM    AST 20 06/12/2024 09:02 AM    ALT 13 06/12/2024 09:02 AM       POC Tests:   Recent Labs     10/23/24  1025   POCGLU 102*       Coags:   Lab Results   Component Value Date/Time    PROTIME 12.9 07/07/2023 12:05 PM    INR 0.9 07/07/2023 12:05 PM    APTT 25.4 03/08/2022 01:35 PM       HCG (If Applicable): No results found for: \"PREGTESTUR\", \"PREGSERUM\", \"HCG\", \"HCGQUANT\"     ABGs: No results found for: \"PHART\", \"PO2ART\", \"GPB5LVQ\", \"EIQ1FLT\", \"BEART\", \"N7IIUVNV\"     Type & Screen (If Applicable):  Lab Results   Component Value Date    ABORH O POS 03/08/2022    LABANTI NEG 03/08/2022       Drug/Infectious Status (If Applicable):  No results found for: \"HIV\", \"HEPCAB\"    COVID-19 Screening (If Applicable):   Lab Results   Component Value Date/Time    COVID19 Not Detected 09/23/2021 04:09 PM           Anesthesia Evaluation  Patient summary reviewed and Nursing notes reviewed  Airway: Mallampati: II  TM distance: >3 FB   Neck ROM: full  Mouth opening: > = 3 FB   Dental: normal exam         Pulmonary:normal exam    (+)  COPD:    sleep apnea:       asthma:

## 2024-10-25 ENCOUNTER — TELEPHONE (OUTPATIENT)
Dept: GASTROENTEROLOGY | Age: 74
End: 2024-10-25

## 2024-10-25 NOTE — TELEPHONE ENCOUNTER
Patients wife called stating her  can't afford the Linzess. Is there another medication you can prescribe. Please advise. Thanks

## 2024-10-25 NOTE — TELEPHONE ENCOUNTER
Spoke with patient, answered all questions.  Unable to afford Linzess advised to try magnesium citrate

## 2024-11-14 ENCOUNTER — TELEPHONE (OUTPATIENT)
Dept: ORTHOPEDIC SURGERY | Facility: CLINIC | Age: 74
End: 2024-11-14
Payer: MEDICARE

## 2024-11-14 DIAGNOSIS — M17.11 PRIMARY OSTEOARTHRITIS OF RIGHT KNEE: ICD-10-CM

## 2024-11-14 DIAGNOSIS — Z96.652 S/P TOTAL KNEE ARTHROPLASTY, LEFT: ICD-10-CM

## 2024-11-14 DIAGNOSIS — M25.562 PAIN IN BOTH KNEES, UNSPECIFIED CHRONICITY: ICD-10-CM

## 2024-11-14 DIAGNOSIS — M25.561 PAIN IN BOTH KNEES, UNSPECIFIED CHRONICITY: ICD-10-CM

## 2024-11-14 RX ORDER — MELOXICAM 15 MG/1
15 TABLET ORAL DAILY
Qty: 30 TABLET | Refills: 0 | Status: SHIPPED | OUTPATIENT
Start: 2024-11-14 | End: 2024-12-14

## 2024-11-23 PROBLEM — Z12.11 COLON CANCER SCREENING: Status: RESOLVED | Noted: 2024-10-24 | Resolved: 2024-11-23

## 2024-12-16 ENCOUNTER — HOSPITAL ENCOUNTER (OUTPATIENT)
Dept: LAB | Age: 74
Discharge: HOME | End: 2024-12-16

## 2024-12-19 ENCOUNTER — HOSPITAL ENCOUNTER (OUTPATIENT)
Dept: LAB | Age: 74
Discharge: HOME | End: 2024-12-19

## 2024-12-19 LAB — WHOPPER PROMPT: NORMAL

## 2024-12-25 LAB
MISCELLANEOUS LAB TEST ORDER: ABNORMAL
WHOPPER PROMPT: ABNORMAL

## 2025-01-30 NOTE — PROGRESS NOTES
Sp is here for a right knee cortisone injection    L Inj/Asp: R knee on 1/31/2025 11:37 AM  Indications: pain  Details: 22 G needle, lateral approach  Medications: 5 mL lidocaine 10 mg/mL (1 %); 12 mg betamethasone acet,sod phos 6 mg/mL  Outcome: tolerated well, no immediate complications  Procedure, treatment alternatives, risks and benefits explained, specific risks discussed. Consent was given by the patient. Immediately prior to procedure a time out was called to verify the correct patient, procedure, equipment, support staff and site/side marked as required. Patient was prepped and draped in the usual sterile fashion.         He will ice and work on range of motion and exercises as directed.    He will follow up on an as-needed basis.  We did refill his Mobic rotation these.  His left knee looks great with no swelling redness or warmth whatsoever.      All questions were answered today with the patient.    This note was generated with voice recognition software and may contain grammatical errors.    Scribe Attestation  By signing my name below, I, Jes Torres, Scribe   attest that this documentation has been prepared under the direction and in the presence of Carlos Gutierrez MD.

## 2025-01-31 ENCOUNTER — APPOINTMENT (OUTPATIENT)
Dept: ORTHOPEDIC SURGERY | Facility: CLINIC | Age: 75
End: 2025-01-31
Payer: MEDICARE

## 2025-01-31 DIAGNOSIS — Z96.652 S/P TOTAL KNEE ARTHROPLASTY, LEFT: ICD-10-CM

## 2025-01-31 DIAGNOSIS — M17.11 OSTEOARTHRITIS OF RIGHT KNEE, UNSPECIFIED OSTEOARTHRITIS TYPE: Primary | ICD-10-CM

## 2025-01-31 RX ORDER — MELOXICAM 15 MG/1
15 TABLET ORAL DAILY
Qty: 30 TABLET | Refills: 2 | Status: SHIPPED | OUTPATIENT
Start: 2025-01-31 | End: 2025-05-01

## 2025-01-31 RX ORDER — BETAMETHASONE SODIUM PHOSPHATE AND BETAMETHASONE ACETATE 3; 3 MG/ML; MG/ML
12 INJECTION, SUSPENSION INTRA-ARTICULAR; INTRALESIONAL; INTRAMUSCULAR; SOFT TISSUE
Status: COMPLETED | OUTPATIENT
Start: 2025-01-31 | End: 2025-01-31

## 2025-01-31 RX ORDER — LIDOCAINE HYDROCHLORIDE 10 MG/ML
5 INJECTION, SOLUTION INFILTRATION; PERINEURAL
Status: COMPLETED | OUTPATIENT
Start: 2025-01-31 | End: 2025-01-31

## 2025-01-31 RX ADMIN — LIDOCAINE HYDROCHLORIDE 5 ML: 10 INJECTION, SOLUTION INFILTRATION; PERINEURAL at 11:37

## 2025-01-31 RX ADMIN — BETAMETHASONE SODIUM PHOSPHATE AND BETAMETHASONE ACETATE 12 MG: 3; 3 INJECTION, SUSPENSION INTRA-ARTICULAR; INTRALESIONAL; INTRAMUSCULAR; SOFT TISSUE at 11:37

## 2025-03-12 ENCOUNTER — ANCILLARY PROCEDURE (OUTPATIENT)
Facility: HOSPITAL | Age: 75
End: 2025-03-12
Payer: MEDICARE

## 2025-03-12 DIAGNOSIS — G30.1 ALZHEIMER'S DISEASE WITH LATE ONSET (CODE): Primary | ICD-10-CM

## 2025-03-12 PROCEDURE — 3430000001 HC RX 343 DIAGNOSTIC RADIOPHARMACEUTICALS: Performed by: PSYCHIATRY & NEUROLOGY

## 2025-03-12 PROCEDURE — A9586 FLORBETAPIR F18: HCPCS | Performed by: PSYCHIATRY & NEUROLOGY

## 2025-03-12 PROCEDURE — 78814 PET IMAGE W/CT LMTD: CPT | Performed by: STUDENT IN AN ORGANIZED HEALTH CARE EDUCATION/TRAINING PROGRAM

## 2025-03-12 PROCEDURE — 78814 PET IMAGE W/CT LMTD: CPT | Mod: PI

## 2025-03-12 RX ADMIN — FLORBETAPIR F 18 11.1 MILLICURIE: 51 INJECTION, SOLUTION INTRAVENOUS at 13:36

## 2025-04-15 DIAGNOSIS — M17.11 OSTEOARTHRITIS OF RIGHT KNEE, UNSPECIFIED OSTEOARTHRITIS TYPE: ICD-10-CM

## 2025-04-16 RX ORDER — MELOXICAM 15 MG/1
15 TABLET ORAL DAILY
Qty: 30 TABLET | Refills: 0 | Status: SHIPPED | OUTPATIENT
Start: 2025-04-16

## 2025-04-21 ENCOUNTER — HOSPITAL ENCOUNTER (OUTPATIENT)
Dept: LAB | Age: 75
Discharge: HOME | End: 2025-04-21
Payer: MEDICARE

## 2025-04-21 LAB — URATE SERPL-MCNC: 8.1 MG/DL (ref 3.4–7)

## 2025-04-21 PROCEDURE — 36415 COLL VENOUS BLD VENIPUNCTURE: CPT

## 2025-04-21 PROCEDURE — 84550 ASSAY OF BLOOD/URIC ACID: CPT

## 2025-04-22 DIAGNOSIS — M17.11 OSTEOARTHRITIS OF RIGHT KNEE, UNSPECIFIED OSTEOARTHRITIS TYPE: ICD-10-CM

## 2025-04-23 RX ORDER — MELOXICAM 15 MG/1
15 TABLET ORAL DAILY
Qty: 30 TABLET | Refills: 0 | Status: SHIPPED | OUTPATIENT
Start: 2025-04-23

## 2025-06-17 ENCOUNTER — HOSPITAL ENCOUNTER (OUTPATIENT)
Dept: GENERAL RADIOLOGY | Age: 75
Discharge: HOME | End: 2025-06-19
Attending: INTERNAL MEDICINE
Payer: MEDICARE

## 2025-06-17 ENCOUNTER — HOSPITAL ENCOUNTER (OUTPATIENT)
Age: 75
Discharge: HOME | End: 2025-06-19
Payer: MEDICARE

## 2025-06-17 ENCOUNTER — TRANSCRIBE ORDERS (OUTPATIENT)
Dept: GENERAL RADIOLOGY | Age: 75
End: 2025-06-17

## 2025-06-17 ENCOUNTER — HOSPITAL ENCOUNTER (OUTPATIENT)
Dept: LAB | Age: 75
Discharge: HOME | End: 2025-06-17
Payer: MEDICARE

## 2025-06-17 DIAGNOSIS — R05.9 COUGH, UNSPECIFIED TYPE: Primary | ICD-10-CM

## 2025-06-17 DIAGNOSIS — R05.9 COUGH, UNSPECIFIED TYPE: ICD-10-CM

## 2025-06-17 LAB
ALBUMIN SERPL-MCNC: 4.3 G/DL (ref 3.5–4.6)
ALP SERPL-CCNC: 93 U/L (ref 35–104)
ALT SERPL-CCNC: 15 U/L (ref 0–41)
ANION GAP SERPL CALCULATED.3IONS-SCNC: 9 MEQ/L (ref 9–15)
AST SERPL-CCNC: 23 U/L (ref 0–40)
BILIRUB SERPL-MCNC: 0.4 MG/DL (ref 0.2–0.7)
BUN SERPL-MCNC: 12 MG/DL (ref 8–23)
CALCIUM SERPL-MCNC: 9.2 MG/DL (ref 8.5–9.9)
CHLORIDE SERPL-SCNC: 105 MEQ/L (ref 95–107)
CO2 SERPL-SCNC: 25 MEQ/L (ref 20–31)
CREAT SERPL-MCNC: 1.66 MG/DL (ref 0.7–1.2)
ERYTHROCYTE [DISTWIDTH] IN BLOOD BY AUTOMATED COUNT: 15.5 % (ref 11.5–14.5)
GLOBULIN SER CALC-MCNC: 2.9 G/DL (ref 2.3–3.5)
GLUCOSE SERPL-MCNC: 130 MG/DL (ref 70–99)
HCT VFR BLD AUTO: 40.9 % (ref 42–52)
HGB BLD-MCNC: 13.5 G/DL (ref 14–18)
MCH RBC QN AUTO: 28.9 PG (ref 27–31.3)
MCHC RBC AUTO-ENTMCNC: 33 % (ref 33–37)
MCV RBC AUTO: 87.6 FL (ref 79–92.2)
PLATELET # BLD AUTO: 177 K/UL (ref 130–400)
POTASSIUM SERPL-SCNC: 4.3 MEQ/L (ref 3.4–4.9)
PROT SERPL-MCNC: 7.2 G/DL (ref 6.3–8)
RBC # BLD AUTO: 4.67 M/UL (ref 4.7–6.1)
SODIUM SERPL-SCNC: 139 MEQ/L (ref 135–144)
TSH SERPL-MCNC: 0.38 UIU/ML (ref 0.44–3.86)
WBC # BLD AUTO: 4.8 K/UL (ref 4.8–10.8)

## 2025-06-17 PROCEDURE — 71046 X-RAY EXAM CHEST 2 VIEWS: CPT

## 2025-06-17 PROCEDURE — 85027 COMPLETE CBC AUTOMATED: CPT

## 2025-06-17 PROCEDURE — 80053 COMPREHEN METABOLIC PANEL: CPT

## 2025-06-17 PROCEDURE — 84443 ASSAY THYROID STIM HORMONE: CPT

## 2025-06-17 PROCEDURE — 36415 COLL VENOUS BLD VENIPUNCTURE: CPT

## 2025-06-18 ENCOUNTER — RESULTS FOLLOW-UP (OUTPATIENT)
Dept: INTERNAL MEDICINE CLINIC | Age: 75
End: 2025-06-18

## 2025-08-08 ENCOUNTER — HOSPITAL ENCOUNTER (OUTPATIENT)
Dept: LAB | Age: 75
Discharge: HOME | End: 2025-08-08
Payer: MEDICARE

## 2025-08-08 LAB
ALBUMIN SERPL-MCNC: 4.3 G/DL (ref 3.5–4.6)
ALP SERPL-CCNC: 90 U/L (ref 35–104)
ALT SERPL-CCNC: 11 U/L (ref 0–41)
ANION GAP SERPL CALCULATED.3IONS-SCNC: 10 MEQ/L (ref 9–15)
AST SERPL-CCNC: 28 U/L (ref 0–40)
BILIRUB SERPL-MCNC: 0.5 MG/DL (ref 0.2–0.7)
BUN SERPL-MCNC: 17 MG/DL (ref 8–23)
CALCIUM SERPL-MCNC: 9.5 MG/DL (ref 8.5–9.9)
CHLORIDE SERPL-SCNC: 105 MEQ/L (ref 95–107)
CHOLEST SERPL-MCNC: 126 MG/DL (ref 0–199)
CO2 SERPL-SCNC: 26 MEQ/L (ref 20–31)
CREAT SERPL-MCNC: 2.12 MG/DL (ref 0.7–1.2)
GLOBULIN SER CALC-MCNC: 3 G/DL (ref 2.3–3.5)
GLUCOSE SERPL-MCNC: 100 MG/DL (ref 70–99)
HDLC SERPL-MCNC: 35 MG/DL (ref 40–59)
LDLC SERPL CALC-MCNC: 59 MG/DL (ref 0–129)
POTASSIUM SERPL-SCNC: 4.2 MEQ/L (ref 3.4–4.9)
PROT SERPL-MCNC: 7.3 G/DL (ref 6.3–8)
SODIUM SERPL-SCNC: 141 MEQ/L (ref 135–144)
TRIGL SERPL-MCNC: 162 MG/DL (ref 0–150)
TSH REFLEX: 3.28 UIU/ML (ref 0.44–3.86)

## 2025-08-08 PROCEDURE — 36415 COLL VENOUS BLD VENIPUNCTURE: CPT

## 2025-08-08 PROCEDURE — 80053 COMPREHEN METABOLIC PANEL: CPT

## 2025-08-08 PROCEDURE — 84443 ASSAY THYROID STIM HORMONE: CPT

## 2025-08-08 PROCEDURE — 80061 LIPID PANEL: CPT

## 2025-08-29 ENCOUNTER — APPOINTMENT (OUTPATIENT)
Dept: ORTHOPEDIC SURGERY | Facility: CLINIC | Age: 75
End: 2025-08-29
Payer: MEDICARE

## 2025-08-29 DIAGNOSIS — M17.11 OSTEOARTHRITIS OF RIGHT KNEE, UNSPECIFIED OSTEOARTHRITIS TYPE: Primary | ICD-10-CM

## 2025-08-29 PROCEDURE — 20610 DRAIN/INJ JOINT/BURSA W/O US: CPT | Performed by: ORTHOPAEDIC SURGERY

## 2025-08-29 PROCEDURE — 99214 OFFICE O/P EST MOD 30 MIN: CPT | Performed by: ORTHOPAEDIC SURGERY

## 2025-08-29 RX ORDER — LIDOCAINE HYDROCHLORIDE 10 MG/ML
5 INJECTION, SOLUTION INFILTRATION; PERINEURAL
Status: COMPLETED | OUTPATIENT
Start: 2025-08-29 | End: 2025-08-29

## 2025-08-29 RX ORDER — BETAMETHASONE SODIUM PHOSPHATE AND BETAMETHASONE ACETATE 3; 3 MG/ML; MG/ML
12 INJECTION, SUSPENSION INTRA-ARTICULAR; INTRALESIONAL; INTRAMUSCULAR; SOFT TISSUE
Status: COMPLETED | OUTPATIENT
Start: 2025-08-29 | End: 2025-08-29

## 2025-08-29 RX ADMIN — LIDOCAINE HYDROCHLORIDE 5 ML: 10 INJECTION, SOLUTION INFILTRATION; PERINEURAL at 10:45

## 2025-08-29 RX ADMIN — BETAMETHASONE SODIUM PHOSPHATE AND BETAMETHASONE ACETATE 12 MG: 3; 3 INJECTION, SUSPENSION INTRA-ARTICULAR; INTRALESIONAL; INTRAMUSCULAR; SOFT TISSUE at 10:45

## (undated) DEVICE — GLOVE ORANGE PI 7 1/2   MSG9075

## (undated) DEVICE — SUTURE VCRL SZ 1 L27IN ABSRB UD L36MM CT-1 1/2 CIR JJ40G

## (undated) DEVICE — COVER LT HNDL BLU PLAS

## (undated) DEVICE — 3M™ IOBAN™ 2 ANTIMICROBIAL INCISE DRAPE 6650EZ: Brand: IOBAN™ 2

## (undated) DEVICE — GUIDEPIN SURG L29MM FEM TIB PREFERRED PT SPEC CR-FLEX DISP

## (undated) DEVICE — Z DISCONTINUED PER MEDLINE USE 2741944 DRESSING AQUACEL 12 IN SURG W9XL30CM SIL CVR WTRPRF VIR BACT BARR ANTIMIC

## (undated) DEVICE — HYPODERMIC SAFETY NEEDLE: Brand: MAGELLAN

## (undated) DEVICE — TUBE ENDOSCP COLON CHANNEL

## (undated) DEVICE — Device: Brand: STABLECUT®

## (undated) DEVICE — ADAPTER FLSH PMP FLD MGMT GI IRRIG OFP 2 DISPOSABLE

## (undated) DEVICE — 35 ML SYRINGE LUER-LOCK TIP: Brand: MONOJECT

## (undated) DEVICE — MAT FLR SURG QUICKWICK 28X54 IN DISP

## (undated) DEVICE — SUPPLEMENT DIGESTIVE H2O SOL GI-EASE

## (undated) DEVICE — BLADE SAW W125XL70MM THK064MM CUT THK094MM REPL RECIP DBL

## (undated) DEVICE — ENDOSCOPIC TRAY TRNSPRT 20.5X16.5X4.1 IN RECYCL SUGAR PULP

## (undated) DEVICE — BLADE RMR L26MM PAT W/ PILOT H

## (undated) DEVICE — CATHETER SCLERO L240CM NDL 25GA L4MM SHTH DIA2.3MM CNTRST

## (undated) DEVICE — SIPS DUAL 2 MINUTE TIP

## (undated) DEVICE — ENDO CARRY-ON PROCEDURE KIT INCLUDES LUBRICANT, DEFENDO OLYMPUS AIR, WATER, SUCTION, BIOPSY VALVE KIT, ENZYMATIC SPONGE, AND BASIN.: Brand: ENDO CARRY-ON PROCEDURE KIT

## (undated) DEVICE — DRAPE SURG EXT FEN REINF ST W O FLD PCH STD

## (undated) DEVICE — SUTURE VCRL + SZ 1 L27IN ABSRB UD CT-1 L36MM 1/2 CIR TAPR VCPP40D

## (undated) DEVICE — ENDO CARRY-ON PROCEDURE KIT: Brand: ENDO CARRY-ON PROCEDURE KIT

## (undated) DEVICE — SUTURE VCRL SZ 2-0 L36IN ABSRB UD L40MM CT 1/2 CIR J957H

## (undated) DEVICE — SINGLE PORT MANIFOLD: Brand: NEPTUNE 2

## (undated) DEVICE — MEDI-VAC NON-CONDUCTIVE SUCTION TUBING: Brand: CARDINAL HEALTH

## (undated) DEVICE — TUBE SET 96 MM 64 MM H2O PERISTALTIC STD AUX CHANNEL

## (undated) DEVICE — GUIDEPIN SURG KT FOR MED SURG LPS-FLEX

## (undated) DEVICE — UNDERCAST PADDING: Brand: DEROYAL

## (undated) DEVICE — BRUSH ENDO CLN L90.5IN SHTH DIA1.7MM BRIST DIA5-7MM 2-6MM

## (undated) DEVICE — GLOVE ORANGE PI 8 1/2   MSG9085

## (undated) DEVICE — GLOVE SURG SZ 75 L12IN FNGR THK94MIL STD WHT LTX FREE

## (undated) DEVICE — 4-PORT MANIFOLD: Brand: NEPTUNE 2

## (undated) DEVICE — MEDI-VAC YANKAUER SUCTION HANDLE W/BULBOUS TIP: Brand: CARDINAL HEALTH

## (undated) DEVICE — 450 ML BOTTLE OF 0.05% CHLORHEXIDINE GLUCONATE IN 99.95% STERILE WATER FOR IRRIGATION, USP AND APPLICATOR.: Brand: IRRISEPT ANTIMICROBIAL WOUND LAVAGE

## (undated) DEVICE — GOWN,AURORA,NONRNF,XL,30/CS: Brand: MEDLINE

## (undated) DEVICE — Device: Brand: ENDO SMARTCAP

## (undated) DEVICE — STERILE PATIENT PROTECTIVE PAD FOR IMP® KNEE POSITIONERS & COHESIVE WRAP (10 / CASE): Brand: DE MAYO KNEE POSITIONER®

## (undated) DEVICE — TRAP POLYP BALEEN

## (undated) DEVICE — SNARE ENDOSCP POLYP 2.4 MM 240 CM 10 MM 2.8 MM CAPTIVATOR

## (undated) DEVICE — BANDAGE COMPR W6INXL9FT BLU 1 LAYR NO CLSR ESMARCH

## (undated) DEVICE — BLADE RMR L32MM PAT W/ PILOT H

## (undated) DEVICE — SPLINT ORTH 22IN KNEE BASIC

## (undated) DEVICE — TUBING IRRIGATION 140/160/180/190 SER GI ENDOSCP SMARTCAP

## (undated) DEVICE — 3M™ STERI-DRAPE™ INSTRUMENT POUCH 1018: Brand: STERI-DRAPE™

## (undated) DEVICE — 2000CC GUARDIAN II: Brand: GUARDIAN

## (undated) DEVICE — SUTURE MCRYL SZ 4-0 L27IN ABSRB UD L19MM PS-2 1/2 CIR PRIM Y426H

## (undated) DEVICE — TABLE COVER: Brand: CONVERTORS

## (undated) DEVICE — BW-412T DISP COMBO CLEANING BRUSH: Brand: SINGLE USE COMBINATION CLEANING BRUSH

## (undated) DEVICE — TUBING, SUCTION, 1/4" X 10', STRAIGHT: Brand: MEDLINE

## (undated) DEVICE — T4 HOOD

## (undated) DEVICE — GLOVE ORANGE PI 7   MSG9070

## (undated) DEVICE — 3M™ STERI-DRAPE™ U-DRAPE 1015: Brand: STERI-DRAPE™

## (undated) DEVICE — CHLORAPREP 26ML ORANGE

## (undated) DEVICE — BANDAGE COMPR M W6INXL10YD WHT BGE VELC E MTRX HK AND LOOP

## (undated) DEVICE — TRAYS TRANSPORT SCOPE OASIS W/LID